# Patient Record
Sex: FEMALE | Race: WHITE | NOT HISPANIC OR LATINO | Employment: UNEMPLOYED | URBAN - METROPOLITAN AREA
[De-identification: names, ages, dates, MRNs, and addresses within clinical notes are randomized per-mention and may not be internally consistent; named-entity substitution may affect disease eponyms.]

---

## 2021-09-15 ENCOUNTER — TRANSCRIBE ORDERS (OUTPATIENT)
Dept: ADMINISTRATIVE | Facility: HOSPITAL | Age: 46
End: 2021-09-15

## 2021-09-15 DIAGNOSIS — R18.8 OTHER ASCITES: ICD-10-CM

## 2021-09-15 DIAGNOSIS — K74.69 OTHER CIRRHOSIS OF LIVER (HCC): Primary | ICD-10-CM

## 2021-09-24 ENCOUNTER — APPOINTMENT (OUTPATIENT)
Dept: CT IMAGING | Facility: HOSPITAL | Age: 46
End: 2021-09-24

## 2021-10-11 ENCOUNTER — TRANSCRIBE ORDERS (OUTPATIENT)
Dept: ADMINISTRATIVE | Facility: HOSPITAL | Age: 46
End: 2021-10-11

## 2021-10-11 DIAGNOSIS — R60.0 LOCALIZED EDEMA: Primary | ICD-10-CM

## 2021-10-11 DIAGNOSIS — R19.8 INCREASED ABDOMINAL GIRTH: ICD-10-CM

## 2021-10-11 DIAGNOSIS — M79.89 LEFT LEG SWELLING: ICD-10-CM

## 2021-10-26 ENCOUNTER — HOSPITAL ENCOUNTER (OUTPATIENT)
Dept: ULTRASOUND IMAGING | Facility: HOSPITAL | Age: 46
Discharge: HOME | End: 2021-10-26

## 2021-10-26 ENCOUNTER — HOSPITAL ENCOUNTER (OUTPATIENT)
Dept: CARDIOLOGY | Facility: HOSPITAL | Age: 46
Discharge: HOME | End: 2021-10-26

## 2021-10-26 DIAGNOSIS — M79.89 LEFT LEG SWELLING: ICD-10-CM

## 2021-10-26 DIAGNOSIS — R19.8 INCREASED ABDOMINAL GIRTH: ICD-10-CM

## 2021-10-26 DIAGNOSIS — R60.0 LOCALIZED EDEMA: ICD-10-CM

## 2021-10-26 LAB
BH CV LOWER VASCULAR LEFT COMMON FEMORAL AUGMENT: NORMAL
BH CV LOWER VASCULAR LEFT COMMON FEMORAL COMPETENT: NORMAL
BH CV LOWER VASCULAR LEFT COMMON FEMORAL COMPRESS: NORMAL
BH CV LOWER VASCULAR LEFT COMMON FEMORAL PHASIC: NORMAL
BH CV LOWER VASCULAR LEFT COMMON FEMORAL SPONT: NORMAL
BH CV LOWER VASCULAR LEFT DISTAL FEMORAL COMPRESS: NORMAL
BH CV LOWER VASCULAR LEFT GREATER SAPH AK COMPRESS: NORMAL
BH CV LOWER VASCULAR LEFT GREATER SAPH BK COMPRESS: NORMAL
BH CV LOWER VASCULAR LEFT MID FEMORAL AUGMENT: NORMAL
BH CV LOWER VASCULAR LEFT MID FEMORAL COMPETENT: NORMAL
BH CV LOWER VASCULAR LEFT MID FEMORAL COMPRESS: NORMAL
BH CV LOWER VASCULAR LEFT MID FEMORAL PHASIC: NORMAL
BH CV LOWER VASCULAR LEFT MID FEMORAL SPONT: NORMAL
BH CV LOWER VASCULAR LEFT PERONEAL COMPRESS: NORMAL
BH CV LOWER VASCULAR LEFT POPLITEAL AUGMENT: NORMAL
BH CV LOWER VASCULAR LEFT POPLITEAL COMPETENT: NORMAL
BH CV LOWER VASCULAR LEFT POPLITEAL COMPRESS: NORMAL
BH CV LOWER VASCULAR LEFT POPLITEAL PHASIC: NORMAL
BH CV LOWER VASCULAR LEFT POPLITEAL SPONT: NORMAL
BH CV LOWER VASCULAR LEFT POSTERIOR TIBIAL COMPRESS: NORMAL
BH CV LOWER VASCULAR LEFT PROXIMAL FEMORAL COMPRESS: NORMAL
BH CV LOWER VASCULAR LEFT SAPHENOFEMORAL JUNCTION COMPRESS: NORMAL
BH CV LOWER VASCULAR RIGHT COMMON FEMORAL AUGMENT: NORMAL
BH CV LOWER VASCULAR RIGHT COMMON FEMORAL COMPETENT: NORMAL
BH CV LOWER VASCULAR RIGHT COMMON FEMORAL COMPRESS: NORMAL
BH CV LOWER VASCULAR RIGHT COMMON FEMORAL PHASIC: NORMAL
BH CV LOWER VASCULAR RIGHT COMMON FEMORAL SPONT: NORMAL
MAXIMAL PREDICTED HEART RATE: 173 BPM
STRESS TARGET HR: 147 BPM

## 2021-10-26 PROCEDURE — 93971 EXTREMITY STUDY: CPT | Performed by: SURGERY

## 2021-10-26 PROCEDURE — 76700 US EXAM ABDOM COMPLETE: CPT

## 2021-10-26 PROCEDURE — 93971 EXTREMITY STUDY: CPT

## 2023-02-07 ENCOUNTER — TRANSCRIBE ORDERS (OUTPATIENT)
Dept: ADMINISTRATIVE | Facility: HOSPITAL | Age: 48
End: 2023-02-07
Payer: MEDICAID

## 2023-02-07 DIAGNOSIS — K74.60 CIRRHOSIS OF LIVER WITH ASCITES, UNSPECIFIED HEPATIC CIRRHOSIS TYPE: Primary | ICD-10-CM

## 2023-02-07 DIAGNOSIS — R18.8 CIRRHOSIS OF LIVER WITH ASCITES, UNSPECIFIED HEPATIC CIRRHOSIS TYPE: Primary | ICD-10-CM

## 2023-02-13 ENCOUNTER — HOSPITAL ENCOUNTER (OUTPATIENT)
Dept: INTERVENTIONAL RADIOLOGY/VASCULAR | Facility: HOSPITAL | Age: 48
Discharge: HOME | End: 2023-02-13
Admitting: RADIOLOGY
Payer: MEDICAID

## 2023-02-13 DIAGNOSIS — R18.8 CIRRHOSIS OF LIVER WITH ASCITES, UNSPECIFIED HEPATIC CIRRHOSIS TYPE: ICD-10-CM

## 2023-02-13 DIAGNOSIS — K74.60 CIRRHOSIS OF LIVER WITH ASCITES, UNSPECIFIED HEPATIC CIRRHOSIS TYPE: ICD-10-CM

## 2023-02-13 LAB
APTT PPP: 32.4 SECONDS (ref 24.2–34.2)
INR PPP: 1.38 (ref 0.86–1.15)
PLATELET # BLD AUTO: 58 10*3/MM3 (ref 140–450)
PROTHROMBIN TIME: 17.2 SECONDS (ref 11.8–14.9)

## 2023-02-13 PROCEDURE — 85730 THROMBOPLASTIN TIME PARTIAL: CPT | Performed by: INTERNAL MEDICINE

## 2023-02-13 PROCEDURE — 76942 ECHO GUIDE FOR BIOPSY: CPT

## 2023-02-13 PROCEDURE — 85610 PROTHROMBIN TIME: CPT | Performed by: INTERNAL MEDICINE

## 2023-02-13 PROCEDURE — 85049 AUTOMATED PLATELET COUNT: CPT | Performed by: INTERNAL MEDICINE

## 2023-02-13 RX ORDER — LIDOCAINE HYDROCHLORIDE 20 MG/ML
20 INJECTION, SOLUTION INFILTRATION; PERINEURAL ONCE
Status: COMPLETED | OUTPATIENT
Start: 2023-02-13 | End: 2023-02-13

## 2023-02-13 RX ADMIN — SODIUM BICARBONATE 1 ML: 84 INJECTION, SOLUTION INTRAVENOUS at 11:00

## 2023-02-13 RX ADMIN — LIDOCAINE HYDROCHLORIDE 9 ML: 20 INJECTION, SOLUTION INFILTRATION; PERINEURAL at 11:00

## 2023-04-14 ENCOUNTER — HOSPITAL ENCOUNTER (OUTPATIENT)
Facility: HOSPITAL | Age: 48
Setting detail: OBSERVATION
Discharge: HOME | End: 2023-04-18
Attending: INTERNAL MEDICINE | Admitting: INTERNAL MEDICINE
Payer: MEDICAID

## 2023-04-14 ENCOUNTER — PREP FOR SURGERY (OUTPATIENT)
Dept: OTHER | Facility: HOSPITAL | Age: 48
End: 2023-04-14
Payer: MEDICAID

## 2023-04-14 PROBLEM — R10.9 ABDOMINAL PAIN: Status: ACTIVE | Noted: 2023-04-14

## 2023-04-14 LAB
ALBUMIN SERPL-MCNC: 3.1 G/DL (ref 3.5–5.2)
ALP SERPL-CCNC: 151 U/L (ref 39–117)
ALT SERPL W P-5'-P-CCNC: 29 U/L (ref 1–33)
ANION GAP SERPL CALCULATED.3IONS-SCNC: 7 MMOL/L (ref 5–15)
AST SERPL-CCNC: 36 U/L (ref 1–32)
BASOPHILS # BLD AUTO: 0.02 10*3/MM3 (ref 0–0.2)
BASOPHILS NFR BLD AUTO: 0.2 % (ref 0–1.5)
BILIRUB CONJ SERPL-MCNC: 0.4 MG/DL (ref 0–0.3)
BILIRUB INDIRECT SERPL-MCNC: 1.1 MG/DL
BILIRUB SERPL-MCNC: 1.5 MG/DL (ref 0–1.2)
BUN SERPL-MCNC: 10 MG/DL (ref 6–20)
BUN/CREAT SERPL: 12.7 (ref 7–25)
CALCIUM SPEC-SCNC: 8.6 MG/DL (ref 8.6–10.5)
CHLORIDE SERPL-SCNC: 100 MMOL/L (ref 98–107)
CO2 SERPL-SCNC: 30 MMOL/L (ref 22–29)
CREAT SERPL-MCNC: 0.79 MG/DL (ref 0.57–1)
D-LACTATE SERPL-SCNC: 1.9 MMOL/L (ref 0.5–2)
DEPRECATED RDW RBC AUTO: 49.5 FL (ref 37–54)
EGFRCR SERPLBLD CKD-EPI 2021: 91.8 ML/MIN/1.73
EOSINOPHIL # BLD AUTO: 0.01 10*3/MM3 (ref 0–0.4)
EOSINOPHIL NFR BLD AUTO: 0.1 % (ref 0.3–6.2)
ERYTHROCYTE [DISTWIDTH] IN BLOOD BY AUTOMATED COUNT: 15.4 % (ref 12.3–15.4)
GLUCOSE SERPL-MCNC: 131 MG/DL (ref 65–99)
HCT VFR BLD AUTO: 42.5 % (ref 34–46.6)
HGB BLD-MCNC: 14.8 G/DL (ref 12–15.9)
HOLD SPECIMEN: NORMAL
IMM GRANULOCYTES # BLD AUTO: 0.08 10*3/MM3 (ref 0–0.05)
IMM GRANULOCYTES NFR BLD AUTO: 0.6 % (ref 0–0.5)
LYMPHOCYTES # BLD AUTO: 1.3 10*3/MM3 (ref 0.7–3.1)
LYMPHOCYTES NFR BLD AUTO: 9.9 % (ref 19.6–45.3)
MCH RBC QN AUTO: 30.9 PG (ref 26.6–33)
MCHC RBC AUTO-ENTMCNC: 34.8 G/DL (ref 31.5–35.7)
MCV RBC AUTO: 88.7 FL (ref 79–97)
MONOCYTES # BLD AUTO: 0.56 10*3/MM3 (ref 0.1–0.9)
MONOCYTES NFR BLD AUTO: 4.3 % (ref 5–12)
NEUTROPHILS NFR BLD AUTO: 11.18 10*3/MM3 (ref 1.7–7)
NEUTROPHILS NFR BLD AUTO: 84.9 % (ref 42.7–76)
NRBC BLD AUTO-RTO: 0 /100 WBC (ref 0–0.2)
PLATELET # BLD AUTO: 118 10*3/MM3 (ref 140–450)
PMV BLD AUTO: 10.8 FL (ref 6–12)
POTASSIUM SERPL-SCNC: 4.1 MMOL/L (ref 3.5–5.2)
PROT SERPL-MCNC: 7.1 G/DL (ref 6–8.5)
RBC # BLD AUTO: 4.79 10*6/MM3 (ref 3.77–5.28)
SODIUM SERPL-SCNC: 137 MMOL/L (ref 136–145)
WBC NRBC COR # BLD: 13.15 10*3/MM3 (ref 3.4–10.8)

## 2023-04-14 PROCEDURE — 96374 THER/PROPH/DIAG INJ IV PUSH: CPT

## 2023-04-14 PROCEDURE — 87040 BLOOD CULTURE FOR BACTERIA: CPT | Performed by: STUDENT IN AN ORGANIZED HEALTH CARE EDUCATION/TRAINING PROGRAM

## 2023-04-14 PROCEDURE — 96375 TX/PRO/DX INJ NEW DRUG ADDON: CPT

## 2023-04-14 PROCEDURE — 83605 ASSAY OF LACTIC ACID: CPT | Performed by: STUDENT IN AN ORGANIZED HEALTH CARE EDUCATION/TRAINING PROGRAM

## 2023-04-14 PROCEDURE — 25010000002 MORPHINE PER 10 MG: Performed by: STUDENT IN AN ORGANIZED HEALTH CARE EDUCATION/TRAINING PROGRAM

## 2023-04-14 PROCEDURE — G0379 DIRECT REFER HOSPITAL OBSERV: HCPCS

## 2023-04-14 PROCEDURE — G0378 HOSPITAL OBSERVATION PER HR: HCPCS

## 2023-04-14 PROCEDURE — 94799 UNLISTED PULMONARY SVC/PX: CPT

## 2023-04-14 PROCEDURE — 80076 HEPATIC FUNCTION PANEL: CPT

## 2023-04-14 PROCEDURE — 96372 THER/PROPH/DIAG INJ SC/IM: CPT

## 2023-04-14 PROCEDURE — 94760 N-INVAS EAR/PLS OXIMETRY 1: CPT

## 2023-04-14 PROCEDURE — 99221 1ST HOSP IP/OBS SF/LOW 40: CPT | Performed by: STUDENT IN AN ORGANIZED HEALTH CARE EDUCATION/TRAINING PROGRAM

## 2023-04-14 PROCEDURE — 80048 BASIC METABOLIC PNL TOTAL CA: CPT | Performed by: STUDENT IN AN ORGANIZED HEALTH CARE EDUCATION/TRAINING PROGRAM

## 2023-04-14 PROCEDURE — 25010000002 HEPARIN (PORCINE) PER 1000 UNITS: Performed by: STUDENT IN AN ORGANIZED HEALTH CARE EDUCATION/TRAINING PROGRAM

## 2023-04-14 PROCEDURE — 25010000002 ONDANSETRON PER 1 MG: Performed by: STUDENT IN AN ORGANIZED HEALTH CARE EDUCATION/TRAINING PROGRAM

## 2023-04-14 PROCEDURE — 85025 COMPLETE CBC W/AUTO DIFF WBC: CPT | Performed by: STUDENT IN AN ORGANIZED HEALTH CARE EDUCATION/TRAINING PROGRAM

## 2023-04-14 RX ORDER — SODIUM CHLORIDE 0.9 % (FLUSH) 0.9 %
10 SYRINGE (ML) INJECTION AS NEEDED
Status: DISCONTINUED | OUTPATIENT
Start: 2023-04-14 | End: 2023-04-18 | Stop reason: HOSPADM

## 2023-04-14 RX ORDER — SODIUM CHLORIDE 9 MG/ML
40 INJECTION, SOLUTION INTRAVENOUS AS NEEDED
Status: DISCONTINUED | OUTPATIENT
Start: 2023-04-14 | End: 2023-04-18 | Stop reason: HOSPADM

## 2023-04-14 RX ORDER — AMOXICILLIN AND CLAVULANATE POTASSIUM 875; 125 MG/1; MG/1
1 TABLET, FILM COATED ORAL 2 TIMES DAILY
COMMUNITY
Start: 2023-04-13 | End: 2023-04-18 | Stop reason: HOSPADM

## 2023-04-14 RX ORDER — HEPARIN SODIUM 5000 [USP'U]/ML
5000 INJECTION, SOLUTION INTRAVENOUS; SUBCUTANEOUS EVERY 12 HOURS SCHEDULED
Status: DISCONTINUED | OUTPATIENT
Start: 2023-04-14 | End: 2023-04-18 | Stop reason: HOSPADM

## 2023-04-14 RX ORDER — SPIRONOLACTONE 100 MG/1
100 TABLET, FILM COATED ORAL DAILY
COMMUNITY
Start: 2023-04-13

## 2023-04-14 RX ORDER — HYDROXYZINE PAMOATE 25 MG/1
25 CAPSULE ORAL 3 TIMES DAILY PRN
COMMUNITY
Start: 2022-12-21

## 2023-04-14 RX ORDER — TRAMADOL HYDROCHLORIDE 50 MG/1
50 TABLET ORAL EVERY 6 HOURS PRN
Status: COMPLETED | OUTPATIENT
Start: 2023-04-14 | End: 2023-04-15

## 2023-04-14 RX ORDER — OMEPRAZOLE 20 MG/1
20 CAPSULE, DELAYED RELEASE ORAL DAILY
COMMUNITY
Start: 2023-03-10

## 2023-04-14 RX ORDER — GABAPENTIN 300 MG/1
300 CAPSULE ORAL 3 TIMES DAILY
COMMUNITY
Start: 2023-03-20

## 2023-04-14 RX ORDER — ONDANSETRON 2 MG/ML
4 INJECTION INTRAMUSCULAR; INTRAVENOUS EVERY 6 HOURS PRN
Status: DISCONTINUED | OUTPATIENT
Start: 2023-04-14 | End: 2023-04-14 | Stop reason: SDUPTHER

## 2023-04-14 RX ORDER — MORPHINE SULFATE 2 MG/ML
1 INJECTION, SOLUTION INTRAMUSCULAR; INTRAVENOUS ONCE AS NEEDED
Status: COMPLETED | OUTPATIENT
Start: 2023-04-14 | End: 2023-04-14

## 2023-04-14 RX ORDER — MORPHINE SULFATE 2 MG/ML
1 INJECTION, SOLUTION INTRAMUSCULAR; INTRAVENOUS EVERY 4 HOURS PRN
Status: DISCONTINUED | OUTPATIENT
Start: 2023-04-14 | End: 2023-04-14

## 2023-04-14 RX ORDER — LACTULOSE 10 G/15ML
20 SOLUTION ORAL DAILY
COMMUNITY
Start: 2023-04-13

## 2023-04-14 RX ORDER — CEFTRIAXONE SODIUM 2 G/50ML
2 INJECTION, SOLUTION INTRAVENOUS EVERY 24 HOURS
Status: COMPLETED | OUTPATIENT
Start: 2023-04-14 | End: 2023-04-16

## 2023-04-14 RX ORDER — BUPRENORPHINE HYDROCHLORIDE AND NALOXONE HYDROCHLORIDE DIHYDRATE 8; 2 MG/1; MG/1
TABLET SUBLINGUAL
Status: ON HOLD | COMMUNITY
Start: 2023-04-10 | End: 2023-04-14

## 2023-04-14 RX ORDER — TIZANIDINE 4 MG/1
4 TABLET ORAL 2 TIMES DAILY PRN
Status: ON HOLD | COMMUNITY
Start: 2023-03-20 | End: 2023-04-14

## 2023-04-14 RX ORDER — SODIUM CHLORIDE 0.9 % (FLUSH) 0.9 %
10 SYRINGE (ML) INJECTION EVERY 12 HOURS SCHEDULED
Status: DISCONTINUED | OUTPATIENT
Start: 2023-04-14 | End: 2023-04-18 | Stop reason: HOSPADM

## 2023-04-14 RX ORDER — FUROSEMIDE 40 MG/1
40 TABLET ORAL DAILY
COMMUNITY
Start: 2023-04-13

## 2023-04-14 RX ORDER — ONDANSETRON 2 MG/ML
4 INJECTION INTRAMUSCULAR; INTRAVENOUS EVERY 6 HOURS PRN
Status: DISCONTINUED | OUTPATIENT
Start: 2023-04-14 | End: 2023-04-18 | Stop reason: HOSPADM

## 2023-04-14 RX ADMIN — Medication 10 ML: at 22:21

## 2023-04-14 RX ADMIN — TRAMADOL HYDROCHLORIDE 50 MG: 50 TABLET ORAL at 22:21

## 2023-04-14 RX ADMIN — HEPARIN SODIUM 5000 UNITS: 5000 INJECTION INTRAVENOUS; SUBCUTANEOUS at 23:38

## 2023-04-14 RX ADMIN — ONDANSETRON 4 MG: 2 INJECTION INTRAMUSCULAR; INTRAVENOUS at 22:20

## 2023-04-14 RX ADMIN — MORPHINE SULFATE 1 MG: 2 INJECTION, SOLUTION INTRAMUSCULAR; INTRAVENOUS at 23:38

## 2023-04-14 NOTE — PROGRESS NOTES
Accept Note:  Contacted by Dr. Beach at [facility] requesting transfer for GI evaluation.  Per report, pt has cirrhosis and presented there on 4/11/2023 where she underwent a paracentesis with no signs of SBP.  She presented today with abd pain and vomiting.  She was afebrile.  WBC up from 8 to 11.  He spoke with Dr. Mathis who agreed for GI here to see the patient and notified the on-call GI doc here Dr. Veras.     Electronically signed by Esdras Gates MD, 04/14/23, 7:31 PM EDT.

## 2023-04-15 LAB
ANION GAP SERPL CALCULATED.3IONS-SCNC: 8 MMOL/L (ref 5–15)
BACTERIA UR QL AUTO: ABNORMAL /HPF
BASOPHILS # BLD AUTO: 0.02 10*3/MM3 (ref 0–0.2)
BASOPHILS NFR BLD AUTO: 0.2 % (ref 0–1.5)
BILIRUB UR QL STRIP: NEGATIVE
BUN SERPL-MCNC: 9 MG/DL (ref 6–20)
BUN/CREAT SERPL: 13.2 (ref 7–25)
CALCIUM SPEC-SCNC: 8.4 MG/DL (ref 8.6–10.5)
CHLORIDE SERPL-SCNC: 100 MMOL/L (ref 98–107)
CLARITY UR: ABNORMAL
CO2 SERPL-SCNC: 27 MMOL/L (ref 22–29)
COLOR UR: ABNORMAL
CREAT SERPL-MCNC: 0.68 MG/DL (ref 0.57–1)
DEPRECATED RDW RBC AUTO: 49.5 FL (ref 37–54)
EGFRCR SERPLBLD CKD-EPI 2021: 106.9 ML/MIN/1.73
EOSINOPHIL # BLD AUTO: 0.01 10*3/MM3 (ref 0–0.4)
EOSINOPHIL NFR BLD AUTO: 0.1 % (ref 0.3–6.2)
ERYTHROCYTE [DISTWIDTH] IN BLOOD BY AUTOMATED COUNT: 15.6 % (ref 12.3–15.4)
GLUCOSE SERPL-MCNC: 134 MG/DL (ref 65–99)
GLUCOSE UR STRIP-MCNC: NEGATIVE MG/DL
HCT VFR BLD AUTO: 39.1 % (ref 34–46.6)
HGB BLD-MCNC: 13.9 G/DL (ref 12–15.9)
HGB UR QL STRIP.AUTO: ABNORMAL
HYALINE CASTS UR QL AUTO: ABNORMAL /LPF
IMM GRANULOCYTES # BLD AUTO: 0.03 10*3/MM3 (ref 0–0.05)
IMM GRANULOCYTES NFR BLD AUTO: 0.3 % (ref 0–0.5)
KETONES UR QL STRIP: NEGATIVE
LEUKOCYTE ESTERASE UR QL STRIP.AUTO: ABNORMAL
LYMPHOCYTES # BLD AUTO: 1.4 10*3/MM3 (ref 0.7–3.1)
LYMPHOCYTES NFR BLD AUTO: 12.5 % (ref 19.6–45.3)
MCH RBC QN AUTO: 31.3 PG (ref 26.6–33)
MCHC RBC AUTO-ENTMCNC: 35.5 G/DL (ref 31.5–35.7)
MCV RBC AUTO: 88.1 FL (ref 79–97)
MONOCYTES # BLD AUTO: 0.62 10*3/MM3 (ref 0.1–0.9)
MONOCYTES NFR BLD AUTO: 5.5 % (ref 5–12)
NEUTROPHILS NFR BLD AUTO: 81.4 % (ref 42.7–76)
NEUTROPHILS NFR BLD AUTO: 9.15 10*3/MM3 (ref 1.7–7)
NITRITE UR QL STRIP: NEGATIVE
NRBC BLD AUTO-RTO: 0 /100 WBC (ref 0–0.2)
PH UR STRIP.AUTO: 6.5 [PH] (ref 5–8)
PLATELET # BLD AUTO: 110 10*3/MM3 (ref 140–450)
PMV BLD AUTO: 10.4 FL (ref 6–12)
POTASSIUM SERPL-SCNC: 3.7 MMOL/L (ref 3.5–5.2)
PROT UR QL STRIP: ABNORMAL
RBC # BLD AUTO: 4.44 10*6/MM3 (ref 3.77–5.28)
RBC # UR STRIP: ABNORMAL /HPF
REF LAB TEST METHOD: ABNORMAL
SODIUM SERPL-SCNC: 135 MMOL/L (ref 136–145)
SP GR UR STRIP: 1.02 (ref 1–1.03)
SQUAMOUS #/AREA URNS HPF: ABNORMAL /HPF
UROBILINOGEN UR QL STRIP: ABNORMAL
WBC # UR STRIP: ABNORMAL /HPF
WBC NRBC COR # BLD: 11.23 10*3/MM3 (ref 3.4–10.8)

## 2023-04-15 PROCEDURE — 96372 THER/PROPH/DIAG INJ SC/IM: CPT

## 2023-04-15 PROCEDURE — 96376 TX/PRO/DX INJ SAME DRUG ADON: CPT

## 2023-04-15 PROCEDURE — 99253 IP/OBS CNSLTJ NEW/EST LOW 45: CPT | Performed by: INTERNAL MEDICINE

## 2023-04-15 PROCEDURE — 96375 TX/PRO/DX INJ NEW DRUG ADDON: CPT

## 2023-04-15 PROCEDURE — 81001 URINALYSIS AUTO W/SCOPE: CPT | Performed by: INTERNAL MEDICINE

## 2023-04-15 PROCEDURE — 25010000002 PROCHLORPERAZINE 10 MG/2ML SOLUTION

## 2023-04-15 PROCEDURE — 0 CEFTRIAXONE PER 250 MG: Performed by: STUDENT IN AN ORGANIZED HEALTH CARE EDUCATION/TRAINING PROGRAM

## 2023-04-15 PROCEDURE — 25010000002 MORPHINE PER 10 MG: Performed by: INTERNAL MEDICINE

## 2023-04-15 PROCEDURE — 94799 UNLISTED PULMONARY SVC/PX: CPT

## 2023-04-15 PROCEDURE — 99233 SBSQ HOSP IP/OBS HIGH 50: CPT | Performed by: INTERNAL MEDICINE

## 2023-04-15 PROCEDURE — 25010000002 HEPARIN (PORCINE) PER 1000 UNITS: Performed by: STUDENT IN AN ORGANIZED HEALTH CARE EDUCATION/TRAINING PROGRAM

## 2023-04-15 PROCEDURE — 25010000002 MORPHINE PER 10 MG: Performed by: STUDENT IN AN ORGANIZED HEALTH CARE EDUCATION/TRAINING PROGRAM

## 2023-04-15 PROCEDURE — G0378 HOSPITAL OBSERVATION PER HR: HCPCS

## 2023-04-15 PROCEDURE — 80048 BASIC METABOLIC PNL TOTAL CA: CPT | Performed by: STUDENT IN AN ORGANIZED HEALTH CARE EDUCATION/TRAINING PROGRAM

## 2023-04-15 PROCEDURE — 87086 URINE CULTURE/COLONY COUNT: CPT | Performed by: INTERNAL MEDICINE

## 2023-04-15 PROCEDURE — 85025 COMPLETE CBC W/AUTO DIFF WBC: CPT | Performed by: STUDENT IN AN ORGANIZED HEALTH CARE EDUCATION/TRAINING PROGRAM

## 2023-04-15 RX ORDER — PROCHLORPERAZINE EDISYLATE 5 MG/ML
5 INJECTION INTRAMUSCULAR; INTRAVENOUS EVERY 6 HOURS PRN
Status: DISCONTINUED | OUTPATIENT
Start: 2023-04-15 | End: 2023-04-18 | Stop reason: HOSPADM

## 2023-04-15 RX ORDER — HYDROXYZINE PAMOATE 25 MG/1
25 CAPSULE ORAL 3 TIMES DAILY PRN
Status: DISCONTINUED | OUTPATIENT
Start: 2023-04-15 | End: 2023-04-18 | Stop reason: HOSPADM

## 2023-04-15 RX ORDER — MORPHINE SULFATE 2 MG/ML
2 INJECTION, SOLUTION INTRAMUSCULAR; INTRAVENOUS EVERY 4 HOURS PRN
Status: DISCONTINUED | OUTPATIENT
Start: 2023-04-15 | End: 2023-04-18 | Stop reason: HOSPADM

## 2023-04-15 RX ORDER — MORPHINE SULFATE 2 MG/ML
1 INJECTION, SOLUTION INTRAMUSCULAR; INTRAVENOUS ONCE
Status: COMPLETED | OUTPATIENT
Start: 2023-04-15 | End: 2023-04-15

## 2023-04-15 RX ORDER — PANTOPRAZOLE SODIUM 40 MG/1
40 TABLET, DELAYED RELEASE ORAL
Status: DISCONTINUED | OUTPATIENT
Start: 2023-04-16 | End: 2023-04-18 | Stop reason: HOSPADM

## 2023-04-15 RX ORDER — GABAPENTIN 300 MG/1
300 CAPSULE ORAL 3 TIMES DAILY
Status: DISCONTINUED | OUTPATIENT
Start: 2023-04-15 | End: 2023-04-18 | Stop reason: HOSPADM

## 2023-04-15 RX ORDER — SPIRONOLACTONE 25 MG/1
100 TABLET ORAL DAILY
Status: DISCONTINUED | OUTPATIENT
Start: 2023-04-15 | End: 2023-04-18 | Stop reason: HOSPADM

## 2023-04-15 RX ORDER — LACTULOSE 10 G/15ML
20 SOLUTION ORAL DAILY
Status: DISCONTINUED | OUTPATIENT
Start: 2023-04-15 | End: 2023-04-18 | Stop reason: HOSPADM

## 2023-04-15 RX ORDER — FUROSEMIDE 40 MG/1
40 TABLET ORAL DAILY
Status: DISCONTINUED | OUTPATIENT
Start: 2023-04-15 | End: 2023-04-16

## 2023-04-15 RX ADMIN — PROCHLORPERAZINE EDISYLATE 5 MG: 5 INJECTION, SOLUTION INTRAMUSCULAR; INTRAVENOUS at 01:46

## 2023-04-15 RX ADMIN — MORPHINE SULFATE 1 MG: 2 INJECTION, SOLUTION INTRAMUSCULAR; INTRAVENOUS at 01:46

## 2023-04-15 RX ADMIN — HEPARIN SODIUM 5000 UNITS: 5000 INJECTION INTRAVENOUS; SUBCUTANEOUS at 09:24

## 2023-04-15 RX ADMIN — CEFTRIAXONE SODIUM 2 G: 2 INJECTION, SOLUTION INTRAVENOUS at 22:14

## 2023-04-15 RX ADMIN — GABAPENTIN 300 MG: 300 CAPSULE ORAL at 16:01

## 2023-04-15 RX ADMIN — FUROSEMIDE 40 MG: 40 TABLET ORAL at 12:56

## 2023-04-15 RX ADMIN — Medication 10 ML: at 21:08

## 2023-04-15 RX ADMIN — SPIRONOLACTONE 100 MG: 25 TABLET ORAL at 12:56

## 2023-04-15 RX ADMIN — LACTULOSE 20 G: 20 SOLUTION ORAL at 12:56

## 2023-04-15 RX ADMIN — TRAMADOL HYDROCHLORIDE 50 MG: 50 TABLET ORAL at 04:21

## 2023-04-15 RX ADMIN — Medication 10 ML: at 09:25

## 2023-04-15 RX ADMIN — MORPHINE SULFATE 2 MG: 2 INJECTION, SOLUTION INTRAMUSCULAR; INTRAVENOUS at 09:24

## 2023-04-15 RX ADMIN — GABAPENTIN 300 MG: 300 CAPSULE ORAL at 21:07

## 2023-04-15 RX ADMIN — MORPHINE SULFATE 2 MG: 2 INJECTION, SOLUTION INTRAMUSCULAR; INTRAVENOUS at 21:06

## 2023-04-15 RX ADMIN — CEFTRIAXONE SODIUM 2 G: 2 INJECTION, SOLUTION INTRAVENOUS at 02:40

## 2023-04-15 RX ADMIN — HYDROXYZINE PAMOATE 25 MG: 25 CAPSULE ORAL at 21:07

## 2023-04-15 RX ADMIN — HEPARIN SODIUM 5000 UNITS: 5000 INJECTION INTRAVENOUS; SUBCUTANEOUS at 21:08

## 2023-04-15 NOTE — CONSULTS
[facility]  Initial Inpatient Consult Note    Referring Provider: Hospitalist service    Reason for Consultation: Question SBP    Subjective     History of present illness:    48 y.o. female history of cirrhosis narcotic abuse status post recent paracentesis at [facility] hospital that showed no signs of SBP if she came in with abdominal pain and vomiting White count was up to 13,000 the patient states she is feeling much better today she is on IV antibiotics and does not want a repeat paracentesis she has not taken any of her diuretics she initially had some leakage around the site but that has stopped    Past Medical History:  Past Medical History:   Diagnosis Date   • Advanced hepatic cirrhosis    • History of drug use    • Seizure    • Smoking greater than 30 pack years      Past Surgical History:  Past Surgical History:   Procedure Laterality Date   • CHOLECYSTECTOMY     • TUBAL ABDOMINAL LIGATION        Social History:   Social History     Tobacco Use   • Smoking status: Every Day     Packs/day: 0.50     Years: 15.00     Pack years: 7.50     Types: Cigarettes   • Smokeless tobacco: Never   Substance Use Topics   • Alcohol use: Never      Family History:  History reviewed. No pertinent family history.    Home Meds:  Medications Prior to Admission   Medication Sig Dispense Refill Last Dose   • amoxicillin-clavulanate (AUGMENTIN) 875-125 MG per tablet Take 1 tablet by mouth 2 (Two) Times a Day.   4/14/2023   • furosemide (LASIX) 40 MG tablet Take 1 tablet by mouth Daily.   4/14/2023   • gabapentin (NEURONTIN) 300 MG capsule Take 1 capsule by mouth 3 (Three) Times a Day.   4/14/2023   • lactulose (CHRONULAC) 10 GM/15ML solution Take 30 mL by mouth Daily.   4/14/2023   • omeprazole (priLOSEC) 20 MG capsule Take 1 capsule by mouth Daily.   4/14/2023   • spironolactone (ALDACTONE) 100 MG tablet Take 1 tablet by mouth Daily.   4/14/2023   • hydrOXYzine pamoate (VISTARIL) 25 MG capsule Take 1  capsule by mouth 3 (Three) Times a Day As Needed.   Unknown     Current Meds:   cefTRIAXone, 2 g, Intravenous, Q24H  heparin (porcine), 5,000 Units, Subcutaneous, Q12H  sodium chloride, 10 mL, Intravenous, Q12H      Allergies:  Allergies   Allergen Reactions   • Codeine Nausea Only     Review of Systems  Pertinent items are noted in HPI, all other systems reviewed and negative         Vital Signs  Temp:  [97.3 °F (36.3 °C)-98.2 °F (36.8 °C)] 97.6 °F (36.4 °C)  Heart Rate:  [] 93  Resp:  [18-20] 18  BP: (146-160)/(64-78) 146/68  Physical Exam:  General Appearance:    Alert, cooperative, in no acute distress   Head:    Normocephalic, without obvious abnormality, atraumatic   Eyes:          conjunctivae and sclerae normal, no   icterus   Throat:   no thrush, oral mucosa moist   Neck:   Supple, no adenopathy   Lungs:     Clear to auscultation bilaterally    Heart:    Regular rhythm and normal rate    Chest Wall:    No abnormalities observed   Abdomen:     Soft, ascitic fluid accumulated paracentesis site nonerythematous mild tenderness diffusely   Extremities:   no edema, no redness   Skin:   No bruising or rash   Psychiatric:  normal mood and insight     Results Review:  [x]  Laboratory   []  Radiology  []  Pathology      I reviewed the patient's new clinical results.    Results from last 7 days   Lab Units 04/15/23  0837 04/14/23  2259   WBC 10*3/mm3 11.23* 13.15*   HEMOGLOBIN g/dL 13.9 14.8   HEMATOCRIT % 39.1 42.5   PLATELETS 10*3/mm3 110* 118*     Results from last 7 days   Lab Units 04/14/23  2104   SODIUM mmol/L 137   POTASSIUM mmol/L 4.1   CHLORIDE mmol/L 100   CO2 mmol/L 30.0*   BUN mg/dL 10   CREATININE mg/dL 0.79   CALCIUM mg/dL 8.6   BILIRUBIN mg/dL 1.5*   ALK PHOS U/L 151*   ALT (SGPT) U/L 29   AST (SGOT) U/L 36*   GLUCOSE mg/dL 131*         No results found for: LIPASE    Radiology:  US Paracentesis    (Results Pending)        Assessment & Plan     Active Hospital Problems    Diagnosis    •  **Abdominal pain         Plan:  Continue IV antibiotics for now; follow-up blood and urine cultures  Patient does not want any repeat paracentesis her white count is slightly elevated from yesterday at 13,000' the site appears warm dry and not infected; difficult to say why she would have SBP if the other outlying hospitals stated that all her work-up for SBP was negative???  As of this past Tuesday  I would restart her diuretics  IOtherwise continue supportive care      I discussed the patients findings and my recommendations with patient and nursing staff.    Electronically signed by Abrahan Siddiqui MD, 04/15/23, 9:23 AM EDT.

## 2023-04-15 NOTE — PLAN OF CARE
Goal Outcome Evaluation:              Outcome Evaluation: Pt has had complaints of pain and nausea since arriving to unit. Pt medicated per orders. Pt states that pain relieving measures have not relieved pain. Pt still continues to leak from paracentesis punture site. VSS. Will continue to monitor.

## 2023-04-15 NOTE — PLAN OF CARE
Goal Outcome Evaluation:              Outcome Evaluation: patient has had no complaints of nausea this shift. IV morphine given x1 dose and pain level significantly improved. \patient did not have abdominal paracentisis this shift, as MD believes it isn't warrented at this time. vss

## 2023-04-15 NOTE — PLAN OF CARE
Goal Outcome Evaluation:           Patient reports pain level decreasing as well as decreased drainage from paracentesis site. Clear, pale, yellow fluid noted, no s/s of infection. Patient also reports abd feeling less taut. Diuretics added back to regimen today. Patient is resting comfortably in bed at this time and hopes to discharge tomorrow.

## 2023-04-15 NOTE — PROGRESS NOTES
[facility]   Hospitalist Progress Note  Date: 4/15/2023  Patient Name: Lara Brock  : 1975  MRN: [id number]  Date of admission: 2023      Subjective   Subjective     Chief Complaint: Abdominal pain    Summary: 47 yo female with h/o narcotic abuse, cirrhosis who was recently admitted to [place] on - for ascites.  She had a paracentesis and per report no signs of SBP on that fluid.  She says she also had pneumonia and was given what IV antibiotics in the hospital.  She was prescribed amoxicillin (or Augmentin?) upon dc.  The next day she came in with vomiting and abdominal pain and was transferred here for further workup.  She does not want a repeat para.    Interval Followup: Says she feels much better.  Abd pain much improved.    Review of Systems   All systems were reviewed and negative except for what is outlined above    Objective   Objective     Vitals:   Temp:  [97.3 °F (36.3 °C)-98.2 °F (36.8 °C)] 97.6 °F (36.4 °C)  Heart Rate:  [] 93  Resp:  [18-20] 18  BP: (146-160)/(64-78) 146/68  Physical Exam    Constitutional: Awake, alert, no acute distress, nontoxic appearing   Eyes: Pupils equal, sclerae anicteric, no conjunctival injection   HENT: NCAT, mucous membranes moist   Neck: Supple, no thyromegaly, no lymphadenopathy, trachea midline   Respiratory: Clear to auscultation bilaterally, nonlabored respirations    Cardiovascular: RRR, no murmurs, rubs, or gallops, palpable pedal pulses bilaterally   Gastrointestinal: sndnt +BS; para site dressed   Musculoskeletal: No bilateral ankle edema, no clubbing or cyanosis to extremities   Psychiatric: Appropriate affect, cooperative   Neurologic: Oriented x 3, speech clear   Skin: No rashes     Result Review    Result Review:  I have reviewed the following:  [x]  Laboratory   CBC        2023    10:23 2023    22:59 4/15/2023    08:37   CBC   WBC  13.15   11.23     RBC  4.79   4.44     Hemoglobin  14.8   13.9    Patient was left a detail message to call back if she agrees with plan.  Transfer to triage dept [id number]   Hematocrit  42.5   39.1     MCV  88.7   88.1     MCH  30.9   31.3     MCHC  34.8   35.5     RDW  15.4   15.6     Platelets 58   118   110       CMP        4/14/2023    21:04 4/15/2023    08:37   CMP   Glucose 131   134     BUN 10   9     Creatinine 0.79   0.68     EGFR 91.8   106.9     Sodium 137   135     Potassium 4.1   3.7     Chloride 100   100     Calcium 8.6   8.4     Total Protein 7.1      Albumin 3.1      Total Bilirubin 1.5      Alkaline Phosphatase 151      AST (SGOT) 36      ALT (SGPT) 29      BUN/Creatinine Ratio 12.7   13.2     Anion Gap 7.0   8.0         []  Microbiology  []  Radiology  []  EKG/Telemetry   []  Cardiology/Vascular   []  Pathology  []  Old records  []  Other:    Assessment & Plan   Assessment / Plan     Assessment:  • Abdominal pain  • Cirrhosis with ascites  • Mild leukocytosis  • Recent pneumonia per patient    Plan:  · Transferred to [facility] per request of [facility] MD  · I'm wondering if her vomiting was actually from the oral antibiotic as the timing of her symptoms seems to coincide with ti  · Will continue abx and repeat wbc in am  · Suspect if nothing new arises she could go home tomorrow    · Appreciate GI input     Discussed plan with RN.    DVT prophylaxis:  Medical DVT prophylaxis orders are present.    CODE STATUS:   Code Status (Patient has no pulse and is not breathing): CPR (Attempt to Resuscitate)  Medical Interventions (Patient has pulse or is breathing): Full Support        Electronically signed by Esdras Gates MD, 04/15/23, 11:17 AM EDT.

## 2023-04-15 NOTE — H&P
[facility]    Patient Identification:  Name:  Lara Brock  Age:  48 y.o.  Sex:  female  :  1975  MRN:  [id number]   Visit Number:  [id number]  Admit Date: 2023   Room number:  4020/1  Primary Care Physician:  Soy Garcia MD    Date of Admission: 2023     Subjective     Chief complaint:  Abdominal pain    History of presenting illness:    This is a 48-year-old female with a past medical history of liver cirrhosis, substance abuse, GERD, hepatitis B, hepatitis C, smoker and seizures presenting with abdominal pain. Pt initially presented to [facility] on  and underwent a paracentesis. She now presents with abdominal pain. Pt initially presented to [facility] and was found to have an increase in WBC from 8 to 11. She transferred to our hospital for further managemet. Pt states she has continued to to have fluid leaking from her recent parcentesis site. Her abdomen is still distended and she is severely tender to minimal palpation. She is now complaining of abdominal pain, nausea and vomiting.   ---------------------------------------------------------------------------------------------------------------------   Review of Systems   Constitutional: Negative for appetite change and fatigue.   HENT: Negative for mouth sores and sore throat.    Eyes: Negative for redness.   Respiratory: Negative for chest tightness.    Cardiovascular: Negative for leg swelling.   Gastrointestinal: Positive for abdominal pain.   Genitourinary: Negative for dysuria and pelvic pain.   Musculoskeletal: Negative for gait problem.   Skin: Negative for pallor.   Allergic/Immunologic: Negative for food allergies.   Neurological: Negative for speech difficulty.   Psychiatric/Behavioral: Negative for confusion.     ---------------------------------------------------------------------------------------------------------------------   Past Medical History:   Diagnosis  Date   • Advanced hepatic cirrhosis    • History of drug use    • Seizure    • Smoking greater than 30 pack years      Past Surgical History:   Procedure Laterality Date   • CHOLECYSTECTOMY     • TUBAL ABDOMINAL LIGATION       History reviewed. No pertinent family history.  Social History     Socioeconomic History   • Marital status: Single   Tobacco Use   • Smoking status: Every Day     Packs/day: 0.50     Years: 15.00     Pack years: 7.50     Types: Cigarettes   • Smokeless tobacco: Never   Vaping Use   • Vaping Use: Never used   Substance and Sexual Activity   • Alcohol use: Never   • Drug use: Yes     Types: Marijuana   • Sexual activity: Not Currently     ---------------------------------------------------------------------------------------------------------------------   Allergies:  Codeine  ---------------------------------------------------------------------------------------------------------------------   Medications below are reported home medications pulling from within the system; at this time, these medications have not been reconciled unless otherwise specified and are in the verification process for further verifcation as current home medications.      Prior to Admission Medications     Prescriptions Last Dose Informant Patient Reported? Taking?    amoxicillin-clavulanate (AUGMENTIN) 875-125 MG per tablet 4/14/2023  Yes Yes    Take 1 tablet by mouth 2 (Two) Times a Day.    furosemide (LASIX) 40 MG tablet 4/14/2023  Yes Yes    Take 1 tablet by mouth Daily.    gabapentin (NEURONTIN) 300 MG capsule 4/14/2023  Yes Yes    Take 1 capsule by mouth 3 (Three) Times a Day.    lactulose (CHRONULAC) 10 GM/15ML solution 4/14/2023  Yes Yes    Take 30 mL by mouth Daily.    omeprazole (priLOSEC) 20 MG capsule 4/14/2023  Yes Yes    Take 1 capsule by mouth Daily.    spironolactone (ALDACTONE) 100 MG tablet 4/14/2023  Yes Yes    Take 1 tablet by mouth Daily.    hydrOXYzine pamoate (VISTARIL) 25 MG capsule Unknown  Yes  No    Take 1 capsule by mouth 3 (Three) Times a Day As Needed.        Objective     Vital Signs:       No data found.     on   ;      There is no height or weight on file to calculate BMI.    Wt Readings from Last 3 Encounters:   No data found for Wt      ----------------------------------------------------------------------------------------------------------------------  PHYSICAL EXAMINATION:  GENERAL: The patient is well developed and nontoxic.  HEENT: Nonicteric sclerae, PERRLA, EOMI. Oropharynx clear. Moist mucous membranes. Conjunctivae appear well perfused.  CHEST: Chest wall is nontender.  HEART: Regular rate and rhythm without murmurs.  LUNGS: Clear to auscultation bilaterally.  ABDOMEN: distended, round and tender to palpation  RECTAL: Deferred.  SKIN: No rash, no excessive bruising, petechiae, or purpura.  NEUROLOGIC: Cranial nerves II-XII intact without motor/sensory deficit.    ---------------------------------------------------------------------------------------------------------------------  --------------------------------------------------------------------------------------------------------------------  LABS:    CBC and coagulation:      Acid/base balance:      Renal and electrolytes:      CrCl cannot be calculated (No successful lab value found.).    Liver and pancreatic function:        Invalid input(s): PROT  Endocrine function:  No results found for: HGBA1C  Point of care bedside glucose levels:      No results found for: TSH, FREET4  Cardiac:        Cultures:  No results found for: COLORU, CLARITYU, SPECGRAV, PHUR, PROTEINUR, GLUCOSEU, KETONESU, BLOODU, NITRITEU, LEUKOCYTESUR, BILIRUBINUR, UROBILINOGEN, RBCUA, WBCUA, BACTERIA, UACOMMENT  Microbiology Results (last 10 days)     ** No results found for the last 240 hours. **          No results found for: PREGTESTUR, PREGSERUM, HCG, HCGQUANT  Pain Management Panel          View : No data to display.                      I have personally  looked at the labs and they are summarized above.  ----------------------------------------------------------------------------------------------------------------------  Detailed radiology reports for the last 24 hours:    Imaging Results (Last 24 Hours)     ** No results found for the last 24 hours. **        Final impressions for the last 30 days of radiology reports:    CT Abdomen Pelvis Without Contrast    Result Date: 4/14/2023  Cirrhosis with a small amount of ascites but severe anasarca. Electronically Signed: Lencho Orr MD 2023/04/14 at 13:40 CDT Reading Location ID and State: [address] / [place] Tel [phone], Service support  [phone], Fax [phone]    XR Chest 1 View    Result Date: 4/12/2023  Slight interval worsening of left basilar airspace disease with further obscuring of the left hemidiaphragm. This could represent new or enlarging small left pleural effusion with adjacent atelectasis. Pneumonia also possible. Electronically Signed: Jonathan Shook MD 2023/04/12 at 6:34 CDT Reading Location ID and State: [address] / [place] Tel [phone], Service support  [phone], Fax [phone]    XR Chest 1 View    Result Date: 4/11/2023  Findings suggest developing multifocal pneumonia with small pleural effusions. Electronically Signed: Shaquille Siu MD 2023/04/11 at 15:32 CDT Reading Location ID and State: [address] / [place] Tel [phone], Service support  [phone], Fax [phone]    Ultrasound abdomen right upper quadrant    Result Date: 4/12/2023  Cirrhosis with a small amount of ascites. Electronically Signed: Lencho Orr MD 2023/04/12 at 8:16 CDT Reading Location ID and State: [address] / [place] Tel [phone], Service support  [phone], Fax [phone]        Assessment & Plan     This is a 48-year-old female with a past medical history of liver cirrhosis, substance abuse, GERD, hepatitis B, hepatitis C, smoker and seizures presenting with abdominal pain    #Assessment:  - Abdominal  pain, R/O SBP  - Liver cirrhosis  - Ascites  - GERD  - Substance abuse  - Hepatitis B/B  - Seizures    #Plan:  - Admit to obs  - Start Ceftriaxone 2g Daily  - Consult IR for tentative paracentesis  - Consult GI  - Blood cultures  - Pain management  - Ingrid East MD  [facility] Hospitalist  04/14/23  20:46 EDT

## 2023-04-15 NOTE — PLAN OF CARE
Problem: Adult Inpatient Plan of Care  Goal: Plan of Care Review  Outcome: Ongoing, Progressing  Goal: Patient-Specific Goal (Individualized)  Outcome: Ongoing, Progressing  Goal: Absence of Hospital-Acquired Illness or Injury  Outcome: Ongoing, Progressing  Intervention: Prevent Skin Injury  Description: Perform a screening for skin injury risk, such as pressure or moisture associated skin damage on admission and at regular intervals throughout hospital stay.  Keep all areas of skin (especially folds) clean and dry.  Maintain adequate skin hydration.  Relieve and redistribute pressure and protect bony prominences; implement measures based on patient-specific risk factors.  Match turning and repositioning schedule to clinical condition.  Encourage weight shift frequently; assist with reposition if unable to complete independently.  Float heels off bed; avoid pressure on the Achilles tendon.  Keep skin free from extended contact with medical devices.  Encourage functional activity and mobility, as early as tolerated.  Use aids (e.g., slide boards, mechanical lift) during transfer.  Goal: Optimal Comfort and Wellbeing  Outcome: Ongoing, Progressing  Intervention: Monitor Pain and Promote Comfort  Description: Assess pain level, treatment efficacy and patient response at regular intervals using a consistent pain scale.  Consider the presence and impact of preexisting chronic pain.  Encourage patient and caregiver involvement in pain assessment, interventions and safety measures.  Intervention: Provide Person-Centered Care  Description: Use a family-focused approach to care.  Develop trust and rapport by proactively providing information, encouraging questions, addressing concerns and offering reassurance.  Acknowledge emotional response to hospitalization.  Recognize and utilize personal coping strategies.  Honor spiritual and cultural preferences.  Goal: Readiness for Transition of Care  Outcome: Ongoing,  Progressing   Goal Outcome Evaluation:

## 2023-04-16 LAB
ALBUMIN SERPL-MCNC: 2.8 G/DL (ref 3.5–5.2)
ALBUMIN/GLOB SERPL: 0.8 G/DL
ALP SERPL-CCNC: 145 U/L (ref 39–117)
ALT SERPL W P-5'-P-CCNC: 29 U/L (ref 1–33)
ANION GAP SERPL CALCULATED.3IONS-SCNC: 11.9 MMOL/L (ref 5–15)
AST SERPL-CCNC: 39 U/L (ref 1–32)
BACTERIA SPEC AEROBE CULT: NO GROWTH
BASOPHILS # BLD AUTO: 0.03 10*3/MM3 (ref 0–0.2)
BASOPHILS NFR BLD AUTO: 0.4 % (ref 0–1.5)
BILIRUB SERPL-MCNC: 0.8 MG/DL (ref 0–1.2)
BUN SERPL-MCNC: 8 MG/DL (ref 6–20)
BUN/CREAT SERPL: 11.3 (ref 7–25)
CALCIUM SPEC-SCNC: 8.3 MG/DL (ref 8.6–10.5)
CHLORIDE SERPL-SCNC: 102 MMOL/L (ref 98–107)
CO2 SERPL-SCNC: 24.1 MMOL/L (ref 22–29)
CREAT SERPL-MCNC: 0.71 MG/DL (ref 0.57–1)
DEPRECATED RDW RBC AUTO: 51.5 FL (ref 37–54)
EGFRCR SERPLBLD CKD-EPI 2021: 104.4 ML/MIN/1.73
EOSINOPHIL # BLD AUTO: 0.1 10*3/MM3 (ref 0–0.4)
EOSINOPHIL NFR BLD AUTO: 1.4 % (ref 0.3–6.2)
ERYTHROCYTE [DISTWIDTH] IN BLOOD BY AUTOMATED COUNT: 15.7 % (ref 12.3–15.4)
GLOBULIN UR ELPH-MCNC: 3.5 GM/DL
GLUCOSE SERPL-MCNC: 94 MG/DL (ref 65–99)
HCT VFR BLD AUTO: 39.8 % (ref 34–46.6)
HGB BLD-MCNC: 13.6 G/DL (ref 12–15.9)
IMM GRANULOCYTES # BLD AUTO: 0.02 10*3/MM3 (ref 0–0.05)
IMM GRANULOCYTES NFR BLD AUTO: 0.3 % (ref 0–0.5)
LYMPHOCYTES # BLD AUTO: 1.67 10*3/MM3 (ref 0.7–3.1)
LYMPHOCYTES NFR BLD AUTO: 23.7 % (ref 19.6–45.3)
MCH RBC QN AUTO: 30.8 PG (ref 26.6–33)
MCHC RBC AUTO-ENTMCNC: 34.2 G/DL (ref 31.5–35.7)
MCV RBC AUTO: 90 FL (ref 79–97)
MONOCYTES # BLD AUTO: 0.69 10*3/MM3 (ref 0.1–0.9)
MONOCYTES NFR BLD AUTO: 9.8 % (ref 5–12)
NEUTROPHILS NFR BLD AUTO: 4.53 10*3/MM3 (ref 1.7–7)
NEUTROPHILS NFR BLD AUTO: 64.4 % (ref 42.7–76)
NRBC BLD AUTO-RTO: 0 /100 WBC (ref 0–0.2)
PLATELET # BLD AUTO: 83 10*3/MM3 (ref 140–450)
PMV BLD AUTO: 10.6 FL (ref 6–12)
POTASSIUM SERPL-SCNC: 4.2 MMOL/L (ref 3.5–5.2)
PROCALCITONIN SERPL-MCNC: 0.11 NG/ML (ref 0–0.25)
PROT SERPL-MCNC: 6.3 G/DL (ref 6–8.5)
RBC # BLD AUTO: 4.42 10*6/MM3 (ref 3.77–5.28)
SODIUM SERPL-SCNC: 138 MMOL/L (ref 136–145)
WBC NRBC COR # BLD: 7.04 10*3/MM3 (ref 3.4–10.8)

## 2023-04-16 PROCEDURE — 25010000002 MORPHINE PER 10 MG: Performed by: INTERNAL MEDICINE

## 2023-04-16 PROCEDURE — 84145 PROCALCITONIN (PCT): CPT | Performed by: INTERNAL MEDICINE

## 2023-04-16 PROCEDURE — 80053 COMPREHEN METABOLIC PANEL: CPT | Performed by: INTERNAL MEDICINE

## 2023-04-16 PROCEDURE — G0378 HOSPITAL OBSERVATION PER HR: HCPCS

## 2023-04-16 PROCEDURE — 94799 UNLISTED PULMONARY SVC/PX: CPT

## 2023-04-16 PROCEDURE — 96372 THER/PROPH/DIAG INJ SC/IM: CPT

## 2023-04-16 PROCEDURE — 25010000002 HEPARIN (PORCINE) PER 1000 UNITS: Performed by: STUDENT IN AN ORGANIZED HEALTH CARE EDUCATION/TRAINING PROGRAM

## 2023-04-16 PROCEDURE — 0 CEFTRIAXONE PER 250 MG: Performed by: STUDENT IN AN ORGANIZED HEALTH CARE EDUCATION/TRAINING PROGRAM

## 2023-04-16 PROCEDURE — 96376 TX/PRO/DX INJ SAME DRUG ADON: CPT

## 2023-04-16 PROCEDURE — 99233 SBSQ HOSP IP/OBS HIGH 50: CPT | Performed by: INTERNAL MEDICINE

## 2023-04-16 PROCEDURE — 85025 COMPLETE CBC W/AUTO DIFF WBC: CPT | Performed by: STUDENT IN AN ORGANIZED HEALTH CARE EDUCATION/TRAINING PROGRAM

## 2023-04-16 RX ORDER — FUROSEMIDE 40 MG/1
40 TABLET ORAL
Status: DISCONTINUED | OUTPATIENT
Start: 2023-04-16 | End: 2023-04-18 | Stop reason: HOSPADM

## 2023-04-16 RX ADMIN — Medication 10 ML: at 16:51

## 2023-04-16 RX ADMIN — MORPHINE SULFATE 2 MG: 2 INJECTION, SOLUTION INTRAMUSCULAR; INTRAVENOUS at 16:51

## 2023-04-16 RX ADMIN — Medication 10 ML: at 08:23

## 2023-04-16 RX ADMIN — HEPARIN SODIUM 5000 UNITS: 5000 INJECTION INTRAVENOUS; SUBCUTANEOUS at 08:13

## 2023-04-16 RX ADMIN — MORPHINE SULFATE 2 MG: 2 INJECTION, SOLUTION INTRAMUSCULAR; INTRAVENOUS at 08:22

## 2023-04-16 RX ADMIN — FUROSEMIDE 40 MG: 40 TABLET ORAL at 08:12

## 2023-04-16 RX ADMIN — Medication 10 ML: at 21:59

## 2023-04-16 RX ADMIN — HEPARIN SODIUM 5000 UNITS: 5000 INJECTION INTRAVENOUS; SUBCUTANEOUS at 21:58

## 2023-04-16 RX ADMIN — GABAPENTIN 300 MG: 300 CAPSULE ORAL at 21:58

## 2023-04-16 RX ADMIN — CEFTRIAXONE SODIUM 2 G: 2 INJECTION, SOLUTION INTRAVENOUS at 21:59

## 2023-04-16 RX ADMIN — SPIRONOLACTONE 100 MG: 25 TABLET ORAL at 08:12

## 2023-04-16 RX ADMIN — MORPHINE SULFATE 2 MG: 2 INJECTION, SOLUTION INTRAMUSCULAR; INTRAVENOUS at 22:09

## 2023-04-16 RX ADMIN — GABAPENTIN 300 MG: 300 CAPSULE ORAL at 08:12

## 2023-04-16 RX ADMIN — LACTULOSE 20 G: 20 SOLUTION ORAL at 09:33

## 2023-04-16 RX ADMIN — GABAPENTIN 300 MG: 300 CAPSULE ORAL at 16:52

## 2023-04-16 RX ADMIN — MORPHINE SULFATE 2 MG: 2 INJECTION, SOLUTION INTRAMUSCULAR; INTRAVENOUS at 12:43

## 2023-04-16 RX ADMIN — PANTOPRAZOLE SODIUM 40 MG: 40 TABLET, DELAYED RELEASE ORAL at 05:38

## 2023-04-16 RX ADMIN — FUROSEMIDE 40 MG: 40 TABLET ORAL at 17:22

## 2023-04-16 RX ADMIN — HYDROXYZINE PAMOATE 25 MG: 25 CAPSULE ORAL at 21:58

## 2023-04-16 NOTE — PROGRESS NOTES
[facility]   Hospitalist Progress Note  Date: 2023  Patient Name: Lara Brock  : 1975  MRN: [id number]  Date of admission: 2023      Subjective   Subjective     Chief Complaint: Abdominal pain    Summary: 49 yo female with h/o narcotic abuse, cirrhosis who was recently admitted to [facility] on - for ascites.  She had a paracentesis and per report no signs of SBP on that fluid.  She says she also had pneumonia and was given what IV antibiotics in the hospital.  She was prescribed amoxicillin (or Augmentin?) upon dc.  The next day she came in with vomiting and abdominal pain and was transferred here for further workup.  She does not want a repeat para.    Interval Followup: Still with some mild abdominal pain but overall improved since admission.    Review of Systems   All systems were reviewed and negative except for what is outlined above    Objective   Objective     Vitals:   Temp:  [98.2 °F (36.8 °C)-98.5 °F (36.9 °C)] 98.5 °F (36.9 °C)  Heart Rate:  [] 108  Resp:  [18-20] 20  BP: (133-162)/(75-92) 133/79  Physical Exam    Constitutional: Awake, alert, no acute distress, nontoxic appearing   Eyes: Pupils equal, sclerae anicteric, no conjunctival injection   HENT: NCAT, MMM   Neck: Supple, no thyromegaly, no lymphadenopathy, trachea midline   Respiratory: Clear to auscultation bilaterally, nonlabored respirations    Cardiovascular: RRR, no m   Gastrointestinal: sndnt +BS; para site dressed   Musculoskeletal: No bilateral ankle edema, no clubbing or cyanosis to extremities   Psychiatric: Appropriate affect, cooperative   Neurologic: Oriented x 3, speech clear   Skin: No rashes     Result Review    Result Review:  I have reviewed the following:  [x]  Laboratory   CBC        2023    22:59 4/15/2023    08:37 2023    04:32   CBC   WBC 13.15   11.23   7.04     RBC 4.79   4.44   4.42     Hemoglobin 14.8   13.9   13.6     Hematocrit 42.5   39.1   39.8     MCV 88.7    88.1   90.0     MCH 30.9   31.3   30.8     MCHC 34.8   35.5   34.2     RDW 15.4   15.6   15.7     Platelets 118   110   83       CMP        4/14/2023    21:04 4/15/2023    08:37 4/16/2023    04:32   CMP   Glucose 131   134   94     BUN 10   9   8     Creatinine 0.79   0.68   0.71     EGFR 91.8   106.9   104.4     Sodium 137   135   138     Potassium 4.1   3.7   4.2     Chloride 100   100   102     Calcium 8.6   8.4   8.3     Total Protein 7.1    6.3     Albumin 3.1    2.8     Globulin   3.5     Total Bilirubin 1.5    0.8     Alkaline Phosphatase 151    145     AST (SGOT) 36    39     ALT (SGPT) 29    29     Albumin/Globulin Ratio   0.8     BUN/Creatinine Ratio 12.7   13.2   11.3     Anion Gap 7.0   8.0   11.9         []  Microbiology  []  Radiology  []  EKG/Telemetry   []  Cardiology/Vascular   []  Pathology  []  Old records  []  Other:    Assessment & Plan   Assessment / Plan     Assessment:  • Abdominal pain  • Cirrhosis with ascites  • Mild leukocytosis, resolved  • Recent pneumonia per patient  • Possible UTI    Plan:  · Transferred to [facility] per request of [place] [facility]  · Continue current antibiotics  · Patient did not want another paracentesis  · Await urine culture sensitivity  · Anticipate discharge tomorrow on course of oral antibiotics    Discussed plan with RN.    DVT prophylaxis:  Medical DVT prophylaxis orders are present.    CODE STATUS:   Code Status (Patient has no pulse and is not breathing): CPR (Attempt to Resuscitate)  Medical Interventions (Patient has pulse or is breathing): Full Support      Electronically signed by Esdras Gates MD, 04/16/23, 10:27 AM EDT.

## 2023-04-16 NOTE — PLAN OF CARE
Goal Outcome Evaluation:   Patient pain continues. Medication given per orders. Able to ambulate to and from bathroom independently

## 2023-04-17 ENCOUNTER — APPOINTMENT (OUTPATIENT)
Dept: INTERVENTIONAL RADIOLOGY/VASCULAR | Facility: HOSPITAL | Age: 48
End: 2023-04-17
Payer: MEDICAID

## 2023-04-17 LAB
ALBUMIN SERPL-MCNC: 2.8 G/DL (ref 3.5–5.2)
ALBUMIN/GLOB SERPL: 0.8 G/DL
ALP SERPL-CCNC: 147 U/L (ref 39–117)
ALT SERPL W P-5'-P-CCNC: 31 U/L (ref 1–33)
ANION GAP SERPL CALCULATED.3IONS-SCNC: 8 MMOL/L (ref 5–15)
AST SERPL-CCNC: 35 U/L (ref 1–32)
BASOPHILS # BLD AUTO: 0.03 10*3/MM3 (ref 0–0.2)
BASOPHILS NFR BLD AUTO: 0.5 % (ref 0–1.5)
BILIRUB SERPL-MCNC: 0.8 MG/DL (ref 0–1.2)
BUN SERPL-MCNC: 9 MG/DL (ref 6–20)
BUN/CREAT SERPL: 14.5 (ref 7–25)
CALCIUM SPEC-SCNC: 8.1 MG/DL (ref 8.6–10.5)
CHLORIDE SERPL-SCNC: 100 MMOL/L (ref 98–107)
CO2 SERPL-SCNC: 28 MMOL/L (ref 22–29)
CREAT SERPL-MCNC: 0.62 MG/DL (ref 0.57–1)
DEPRECATED RDW RBC AUTO: 50.4 FL (ref 37–54)
EGFRCR SERPLBLD CKD-EPI 2021: 109.3 ML/MIN/1.73
EOSINOPHIL # BLD AUTO: 0.09 10*3/MM3 (ref 0–0.4)
EOSINOPHIL NFR BLD AUTO: 1.4 % (ref 0.3–6.2)
ERYTHROCYTE [DISTWIDTH] IN BLOOD BY AUTOMATED COUNT: 15.4 % (ref 12.3–15.4)
GLOBULIN UR ELPH-MCNC: 3.5 GM/DL
GLUCOSE SERPL-MCNC: 163 MG/DL (ref 65–99)
HCT VFR BLD AUTO: 38.8 % (ref 34–46.6)
HGB BLD-MCNC: 13.1 G/DL (ref 12–15.9)
IMM GRANULOCYTES # BLD AUTO: 0.01 10*3/MM3 (ref 0–0.05)
IMM GRANULOCYTES NFR BLD AUTO: 0.2 % (ref 0–0.5)
LARGE PLATELETS: NORMAL
LYMPHOCYTES # BLD AUTO: 1.39 10*3/MM3 (ref 0.7–3.1)
LYMPHOCYTES NFR BLD AUTO: 21.8 % (ref 19.6–45.3)
MCH RBC QN AUTO: 30.7 PG (ref 26.6–33)
MCHC RBC AUTO-ENTMCNC: 33.8 G/DL (ref 31.5–35.7)
MCV RBC AUTO: 90.9 FL (ref 79–97)
MONOCYTES # BLD AUTO: 0.56 10*3/MM3 (ref 0.1–0.9)
MONOCYTES NFR BLD AUTO: 8.8 % (ref 5–12)
NEUTROPHILS NFR BLD AUTO: 4.29 10*3/MM3 (ref 1.7–7)
NEUTROPHILS NFR BLD AUTO: 67.3 % (ref 42.7–76)
NRBC BLD AUTO-RTO: 0 /100 WBC (ref 0–0.2)
PLATELET # BLD AUTO: 68 10*3/MM3 (ref 140–450)
PMV BLD AUTO: 10.7 FL (ref 6–12)
POTASSIUM SERPL-SCNC: 4 MMOL/L (ref 3.5–5.2)
PROT SERPL-MCNC: 6.3 G/DL (ref 6–8.5)
RBC # BLD AUTO: 4.27 10*6/MM3 (ref 3.77–5.28)
RBC MORPH BLD: NORMAL
SMALL PLATELETS BLD QL SMEAR: NORMAL
SODIUM SERPL-SCNC: 136 MMOL/L (ref 136–145)
WBC MORPH BLD: NORMAL
WBC NRBC COR # BLD: 6.37 10*3/MM3 (ref 3.4–10.8)

## 2023-04-17 PROCEDURE — 99232 SBSQ HOSP IP/OBS MODERATE 35: CPT | Performed by: INTERNAL MEDICINE

## 2023-04-17 PROCEDURE — G0378 HOSPITAL OBSERVATION PER HR: HCPCS

## 2023-04-17 PROCEDURE — 85025 COMPLETE CBC W/AUTO DIFF WBC: CPT | Performed by: INTERNAL MEDICINE

## 2023-04-17 PROCEDURE — 96372 THER/PROPH/DIAG INJ SC/IM: CPT

## 2023-04-17 PROCEDURE — 85007 BL SMEAR W/DIFF WBC COUNT: CPT | Performed by: INTERNAL MEDICINE

## 2023-04-17 PROCEDURE — 25010000002 MORPHINE PER 10 MG: Performed by: INTERNAL MEDICINE

## 2023-04-17 PROCEDURE — 94799 UNLISTED PULMONARY SVC/PX: CPT

## 2023-04-17 PROCEDURE — 80053 COMPREHEN METABOLIC PANEL: CPT | Performed by: INTERNAL MEDICINE

## 2023-04-17 PROCEDURE — 99233 SBSQ HOSP IP/OBS HIGH 50: CPT | Performed by: INTERNAL MEDICINE

## 2023-04-17 PROCEDURE — 96376 TX/PRO/DX INJ SAME DRUG ADON: CPT

## 2023-04-17 PROCEDURE — 25010000002 HEPARIN (PORCINE) PER 1000 UNITS: Performed by: STUDENT IN AN ORGANIZED HEALTH CARE EDUCATION/TRAINING PROGRAM

## 2023-04-17 RX ORDER — LIDOCAINE HYDROCHLORIDE 20 MG/ML
20 INJECTION, SOLUTION INFILTRATION; PERINEURAL ONCE
Status: DISCONTINUED | OUTPATIENT
Start: 2023-04-17 | End: 2023-04-17

## 2023-04-17 RX ORDER — SIMETHICONE 80 MG
80 TABLET,CHEWABLE ORAL ONCE
Status: COMPLETED | OUTPATIENT
Start: 2023-04-17 | End: 2023-04-17

## 2023-04-17 RX ADMIN — HEPARIN SODIUM 5000 UNITS: 5000 INJECTION INTRAVENOUS; SUBCUTANEOUS at 20:57

## 2023-04-17 RX ADMIN — Medication 10 ML: at 20:57

## 2023-04-17 RX ADMIN — Medication 10 ML: at 09:58

## 2023-04-17 RX ADMIN — SIMETHICONE 80 MG: 80 TABLET, CHEWABLE ORAL at 09:57

## 2023-04-17 RX ADMIN — MORPHINE SULFATE 2 MG: 2 INJECTION, SOLUTION INTRAMUSCULAR; INTRAVENOUS at 05:38

## 2023-04-17 RX ADMIN — LACTULOSE 20 G: 20 SOLUTION ORAL at 10:00

## 2023-04-17 RX ADMIN — SPIRONOLACTONE 100 MG: 25 TABLET ORAL at 09:55

## 2023-04-17 RX ADMIN — MORPHINE SULFATE 2 MG: 2 INJECTION, SOLUTION INTRAMUSCULAR; INTRAVENOUS at 09:54

## 2023-04-17 RX ADMIN — PANTOPRAZOLE SODIUM 40 MG: 40 TABLET, DELAYED RELEASE ORAL at 05:38

## 2023-04-17 RX ADMIN — HEPARIN SODIUM 5000 UNITS: 5000 INJECTION INTRAVENOUS; SUBCUTANEOUS at 09:54

## 2023-04-17 RX ADMIN — GABAPENTIN 300 MG: 300 CAPSULE ORAL at 20:57

## 2023-04-17 RX ADMIN — MORPHINE SULFATE 2 MG: 2 INJECTION, SOLUTION INTRAMUSCULAR; INTRAVENOUS at 14:35

## 2023-04-17 RX ADMIN — FUROSEMIDE 40 MG: 40 TABLET ORAL at 17:20

## 2023-04-17 RX ADMIN — GABAPENTIN 300 MG: 300 CAPSULE ORAL at 09:55

## 2023-04-17 RX ADMIN — FUROSEMIDE 40 MG: 40 TABLET ORAL at 09:55

## 2023-04-17 RX ADMIN — GABAPENTIN 300 MG: 300 CAPSULE ORAL at 16:10

## 2023-04-17 NOTE — NURSING NOTE
END OF SHIFT:  Pt alert and oriented. Refused procedure. No other refusals noted.   IV: C/D/I patent and flushes with ease.    Changes:No new changes noted this shift    Pain control: RTC pain medication administered for abdominal pain.    Progressing to goal: Continue care plan    Discharge: medical approval.

## 2023-04-17 NOTE — PLAN OF CARE
Problem: Adult Inpatient Plan of Care  Goal: Plan of Care Review  Outcome: Ongoing, Progressing  Goal: Patient-Specific Goal (Individualized)  Outcome: Ongoing, Progressing  Goal: Absence of Hospital-Acquired Illness or Injury  Outcome: Ongoing, Progressing  Intervention: Identify and Manage Fall Risk  Recent Flowsheet Documentation  Taken 4/17/2023 1300 by Ashia Nicolas RN  Safety Promotion/Fall Prevention: safety round/check completed  Taken 4/17/2023 1200 by Ashia Nicolas RN  Safety Promotion/Fall Prevention: safety round/check completed  Taken 4/17/2023 1024 by Ashia Nicolas RN  Safety Promotion/Fall Prevention: safety round/check completed  Taken 4/17/2023 0800 by Ashia Nicolas RN  Safety Promotion/Fall Prevention: safety round/check completed  Intervention: Prevent Skin Injury  Recent Flowsheet Documentation  Taken 4/17/2023 0954 by Ashia Nicolas RN  Body Position: position changed independently  Intervention: Prevent and Manage VTE (Venous Thromboembolism) Risk  Recent Flowsheet Documentation  Taken 4/17/2023 0954 by Ashia Nicolas RN  Activity Management: up ad elon  VTE Prevention/Management: (See Mar) other (see comments)  Range of Motion: active ROM (range of motion) encouraged  Intervention: Prevent Infection  Recent Flowsheet Documentation  Taken 4/17/2023 0954 by Ashia Nicolas RN  Infection Prevention:   hand hygiene promoted   personal protective equipment utilized   rest/sleep promoted   single patient room provided  Goal: Optimal Comfort and Wellbeing  Outcome: Ongoing, Progressing  Intervention: Monitor Pain and Promote Comfort  Recent Flowsheet Documentation  Taken 4/17/2023 0954 by Ashia Nicolas RN  Pain Management Interventions: see MAR  Intervention: Provide Person-Centered Care  Recent Flowsheet Documentation  Taken 4/17/2023 0954 by Ashia Nicolas RN  Trust Relationship/Rapport:   care explained   questions answered   questions encouraged    thoughts/feelings acknowledged  Goal: Readiness for Transition of Care  Outcome: Ongoing, Progressing     Problem: Adjustment to Illness (Liver Failure)  Goal: Optimal Coping with Liver Failure  Outcome: Ongoing, Progressing  Intervention: Support Response to Liver Disease  Recent Flowsheet Documentation  Taken 4/17/2023 0954 by Ashia Nicolas RN  Supportive Measures:   active listening utilized   self-care encouraged  Diversional Activities:   television   smartphone     Problem: Fluid and Electrolyte Imbalance (Liver Failure)  Goal: Fluid and Electrolyte Balance  Outcome: Ongoing, Progressing     Problem: Gastrointestinal Complications (Liver Failure)  Goal: Optimal Gastrointestinal Function  Outcome: Ongoing, Progressing  Intervention: Monitor and Support Gastrointestinal Function  Recent Flowsheet Documentation  Taken 4/17/2023 0954 by Ashia Nicolas RN  Body Position: position changed independently     Problem: Impaired Coagulation (Liver Failure)  Goal: Optimal Coagulation Function  Outcome: Ongoing, Progressing     Problem: Infection (Liver Failure)  Goal: Absence of Infection Signs and Symptoms  Outcome: Ongoing, Progressing  Intervention: Prevent or Manage Infection  Recent Flowsheet Documentation  Taken 4/17/2023 0954 by Ashia Nicolas RN  Isolation Precautions: (Yellow medication)   other (see comments)   precautions maintained     Problem: Neurologic Function Impaired (Liver Failure)  Goal: Optimal Neurologic Function  Outcome: Ongoing, Progressing     Problem: Oral Intake Inadequate (Liver Failure)  Goal: Improved Oral Intake  Outcome: Ongoing, Progressing     Problem: Pain (Liver Failure)  Goal: Optimal Pain Control  Outcome: Ongoing, Progressing  Intervention: Prevent or Manage Pain  Recent Flowsheet Documentation  Taken 4/17/2023 0954 by Ashia Nicolas RN  Pain Management Interventions: see MAR  Sleep/Rest Enhancement:   awakenings minimized   regular sleep/rest pattern promoted      Problem: Renal Dysfunction (Liver Failure)  Goal: Optimize Renal Function  Outcome: Ongoing, Progressing     Problem: Respiratory Compromise (Liver Failure)  Goal: Effective Oxygenation and Ventilation  Outcome: Ongoing, Progressing  Intervention: Optimize Oxygenation and Ventilation  Recent Flowsheet Documentation  Taken 4/17/2023 0954 by Ashia Nicolas, RN  Head of Bed (HOB) Positioning: HOB elevated  Intervention: Promote Airway Secretion Clearance  Recent Flowsheet Documentation  Taken 4/17/2023 0954 by Ashia Nicolas, RN  Activity Management: up ad leon  Cough And Deep Breathing: done independently per patient   Goal Outcome Evaluation:

## 2023-04-17 NOTE — NURSING NOTE
Pt stated sister is suppose to call the facility and talk to doctor about paracentesis. Sister called and said she called the office and could not reach doctor. Informed Sister that pt has been transported down to get paracentesis done and if pt had any questions or concerns she could voice them to the doctor before procedure.

## 2023-04-17 NOTE — NURSING NOTE
Pt arrived to the floor and stated that she didn't let them do the paracentesis, she knows her body and knows she does not need fluid taken off and that she's going home tomorrow.

## 2023-04-17 NOTE — CASE MANAGEMENT/SOCIAL WORK
Discharge Planning Assessment   Esteban     Patient Name: Lara Brock  MRN: [id number]  Today's Date: 4/17/2023    Admit Date: 4/14/2023     Discharge Needs Assessment     Row Name 04/17/23 1420       Living Environment    People in Home alone    Unique Family Situation Pt lives in [facility] alone.    Current Living Arrangements apartment    Primary Care Provided by self    Provides Primary Care For no one    Family Caregiver if Needed sibling(s)    Family Caregiver Names Mariaa Viramontes- Sister    Quality of Family Relationships supportive;involved;helpful    Able to Return to Prior Arrangements yes       Resource/Environmental Concerns    Resource/Environmental Concerns none       Transition Planning    Patient/Family Anticipates Transition to home    Patient/Family Anticipated Services at Transition durable medical equipment    Transportation Anticipated family or friend will provide       Discharge Needs Assessment    Readmission Within the Last 30 Days no previous admission in last 30 days    Equipment Currently Used at Home none    Equipment Needed After Discharge hospital bed               Discharge Plan     Row Name 04/17/23 1421       Plan    Plan Pt admitted for abdominal pain- positive for cirrhosis. SW met with pt to assess needs. Pt lives at home alone and reports independence in ADLs. States her sister is very supportive and assists as needed. Discussed discharge plans and pt plans to return home once stable, denies the need for rehab or HHC. Pt does request a hospital bed at home. States due to the swelling in her abdomen it is sometimes hard for her to get up from bed. SW notified her that we can attempt to arranged this, pending insurance approval. Pt v/uJose Angel KERR MD entered room during SW visit. Will follow.    Patient/Family in Agreement with Plan yes              Expected Discharge Date and Time     Expected Discharge Date Expected Discharge Time    Apr 18, 2023          Demographic Summary      Row Name 04/17/23 1418       General Information    Admission Type observation    Arrived From hospital    Referral Source admission list    Reason for Consult discharge planning    Preferred Language English       Contact Information    Permission Granted to Share Info With family/designee               Functional Status     Row Name 04/17/23 1419       Functional Status    Usual Activity Tolerance moderate    Current Activity Tolerance moderate       Functional Status, IADL    Medications independent    Meal Preparation independent    Housekeeping independent    Laundry independent    Shopping assistive person       Mental Status    General Appearance WDL WDL       Mental Status Summary    Recent Changes in Mental Status/Cognitive Functioning no changes               Psychosocial     Row Name 04/17/23 1419       Behavior WDL    Behavior WDL WDL       Emotion Mood WDL    Emotion/Mood/Affect WDL WDL       Speech WDL    Speech WDL WDL       Perceptual State WDL    Perceptual State WDL WDL       Thought Process WDL    Thought Process WDL WDL       Intellectual Performance WDL    Intellectual Performance WDL WDL       Coping/Stress    Sources of Support sibling(s)    Techniques to [place] with Loss/Stress/Change not applicable    Reaction to Health Status accepting    Understanding of Condition and Treatment adequate understanding of medical condition       Developmental Stage (Eriksson's)    Developmental Stage Stage 7 (35-65 years/Middle Adulthood) Generativity vs. Stagnation              JAYNE Negron

## 2023-04-17 NOTE — PROGRESS NOTES
[facility]   Hospitalist Progress Note  Date: 2023  Patient Name: Lara Brock  : 1975  MRN: [id number]  Date of admission: 2023      Subjective   Subjective     Chief Complaint: Abdominal pain    Summary: 47 yo female with h/o narcotic abuse, cirrhosis who was recently admitted to [facility] on - for ascites.  She had a paracentesis and per report no signs of SBP on that fluid.  She says she also had pneumonia and was given what IV antibiotics in the hospital.  She was prescribed amoxicillin (or Augmentin?) upon dc.  The next day she came in with vomiting and abdominal pain and was transferred here for further workup.  She does not want a repeat para.    Interval Followup: complains of abdominal pain still, but wants to go home, wants a gas-x, says she was told she couldn't get another paracentesis.      Review of Systems   All systems were reviewed and negative except for what is outlined above    Objective   Objective     Vitals:   Temp:  [97.7 °F (36.5 °C)-98.4 °F (36.9 °C)] 97.7 °F (36.5 °C)  Heart Rate:  [] 112  Resp:  [16-20] 16  BP: (118-166)/() 154/100  Physical Exam    Constitutional: Awake, alert, no acute distress, nontoxic appearing   Eyes: Pupils equal, sclerae anicteric, no conjunctival injection   HENT: NCAT, MMM   Neck: Supple, no thyromegaly, no lymphadenopathy, trachea midline   Respiratory: Clear to auscultation bilaterally, nonlabored respirations    Cardiovascular: RRR, no m   Gastrointestinal: sndnt +BS; para site dressed   Musculoskeletal: No bilateral ankle edema, no clubbing or cyanosis to extremities   Psychiatric: Appropriate affect, cooperative   Neurologic: Oriented x 3, speech clear   Skin: No rashes     Result Review    Result Review:  I have reviewed the following:  [x]  Laboratory   CBC        4/15/2023    08:37 2023    04:32 2023    08:28   CBC   WBC 11.23   7.04   6.37     RBC 4.44   4.42   4.27     Hemoglobin 13.9    "13.6   13.1     Hematocrit 39.1   39.8   38.8     MCV 88.1   90.0   90.9     MCH 31.3   30.8   30.7     MCHC 35.5   34.2   33.8     RDW 15.6   15.7   15.4     Platelets 110   83   68       CMP        4/15/2023    08:37 4/16/2023    04:32 4/17/2023    08:27   CMP   Glucose 134   94   163     BUN 9   8   9     Creatinine 0.68   0.71   0.62     EGFR 106.9   104.4   109.3     Sodium 135   138   136     Potassium 3.7   4.2   4.0     Chloride 100   102   100     Calcium 8.4   8.3   8.1     Total Protein  6.3   6.3     Albumin  2.8   2.8     Globulin  3.5   3.5     Total Bilirubin  0.8   0.8     Alkaline Phosphatase  145   147     AST (SGOT)  39   35     ALT (SGPT)  29   31     Albumin/Globulin Ratio  0.8   0.8     BUN/Creatinine Ratio 13.2   11.3   14.5     Anion Gap 8.0   11.9   8.0         []  Microbiology  []  Radiology  []  EKG/Telemetry   []  Cardiology/Vascular   []  Pathology  []  Old records  []  Other:    Assessment & Plan   Assessment / Plan     Assessment:  • Abdominal pain  • Cirrhosis with ascites  • Mild leukocytosis, resolved  • Recent pneumonia per patient  • Possible UTI    Plan:  · Transferred to [facility] per request of [facility] ED MD  · Per GI note patient did not want another paracentesis, she says she is not \"allowed\" to.  · Will consult GI.  · Gas-X    Discussed plan with RN.    DVT prophylaxis:  Medical DVT prophylaxis orders are present.    CODE STATUS:   Code Status (Patient has no pulse and is not breathing): CPR (Attempt to Resuscitate)  Medical Interventions (Patient has pulse or is breathing): Full Support    Electronically signed by Esdras Gates MD, 04/17/23, 10:28 AM EDT.               "

## 2023-04-17 NOTE — PLAN OF CARE
Problem: Adult Inpatient Plan of Care  Goal: Plan of Care Review  Outcome: Ongoing, Progressing  Goal: Patient-Specific Goal (Individualized)  Outcome: Ongoing, Progressing  Goal: Absence of Hospital-Acquired Illness or Injury  Outcome: Ongoing, Progressing  Intervention: Identify and Manage Fall Risk  Recent Flowsheet Documentation  Taken 4/17/2023 1300 by Ashia Nicolas RN  Safety Promotion/Fall Prevention: safety round/check completed  Taken 4/17/2023 1200 by Ashia Nicolas RN  Safety Promotion/Fall Prevention: safety round/check completed  Taken 4/17/2023 1024 by Ashia Nicolas RN  Safety Promotion/Fall Prevention: safety round/check completed  Taken 4/17/2023 0800 by Ashia Nicolas RN  Safety Promotion/Fall Prevention: safety round/check completed  Intervention: Prevent Skin Injury  Recent Flowsheet Documentation  Taken 4/17/2023 0954 by Ashia Nicolas RN  Body Position: position changed independently  Intervention: Prevent and Manage VTE (Venous Thromboembolism) Risk  Recent Flowsheet Documentation  Taken 4/17/2023 0954 by Ashia Nicolas RN  Activity Management: up ad leon  VTE Prevention/Management: (See Mar) other (see comments)  Range of Motion: active ROM (range of motion) encouraged  Intervention: Prevent Infection  Recent Flowsheet Documentation  Taken 4/17/2023 0954 by Ashia Nicolas RN  Infection Prevention:   hand hygiene promoted   personal protective equipment utilized   rest/sleep promoted   single patient room provided  Goal: Optimal Comfort and Wellbeing  Outcome: Ongoing, Progressing  Intervention: Monitor Pain and Promote Comfort  Recent Flowsheet Documentation  Taken 4/17/2023 0954 by Ashia Nicolas RN  Pain Management Interventions: see MAR  Intervention: Provide Person-Centered Care  Recent Flowsheet Documentation  Taken 4/17/2023 0954 by Ashia Nicolas RN  Trust Relationship/Rapport:   care explained   questions answered   questions encouraged    thoughts/feelings acknowledged  Goal: Readiness for Transition of Care  Outcome: Ongoing, Progressing     Problem: Adjustment to Illness (Liver Failure)  Goal: Optimal Coping with Liver Failure  Outcome: Ongoing, Progressing  Intervention: Support Response to Liver Disease  Recent Flowsheet Documentation  Taken 4/17/2023 0954 by Ashia Nicolas RN  Supportive Measures:   active listening utilized   self-care encouraged  Diversional Activities:   television   smartphone     Problem: Fluid and Electrolyte Imbalance (Liver Failure)  Goal: Fluid and Electrolyte Balance  Outcome: Ongoing, Progressing     Problem: Gastrointestinal Complications (Liver Failure)  Goal: Optimal Gastrointestinal Function  Outcome: Ongoing, Progressing  Intervention: Monitor and Support Gastrointestinal Function  Recent Flowsheet Documentation  Taken 4/17/2023 0954 by Ashia Nicolas RN  Body Position: position changed independently     Problem: Impaired Coagulation (Liver Failure)  Goal: Optimal Coagulation Function  Outcome: Ongoing, Progressing     Problem: Infection (Liver Failure)  Goal: Absence of Infection Signs and Symptoms  Outcome: Ongoing, Progressing  Intervention: Prevent or Manage Infection  Recent Flowsheet Documentation  Taken 4/17/2023 0954 by Ashia Nicolas RN  Isolation Precautions: (Yellow medication)   other (see comments)   precautions maintained     Problem: Neurologic Function Impaired (Liver Failure)  Goal: Optimal Neurologic Function  Outcome: Ongoing, Progressing     Problem: Oral Intake Inadequate (Liver Failure)  Goal: Improved Oral Intake  Outcome: Ongoing, Progressing     Problem: Pain (Liver Failure)  Goal: Optimal Pain Control  Outcome: Ongoing, Progressing  Intervention: Prevent or Manage Pain  Recent Flowsheet Documentation  Taken 4/17/2023 0954 by Ashia Nicolas RN  Pain Management Interventions: see MAR  Sleep/Rest Enhancement:   awakenings minimized   regular sleep/rest pattern promoted      Problem: Renal Dysfunction (Liver Failure)  Goal: Optimize Renal Function  Outcome: Ongoing, Progressing     Problem: Respiratory Compromise (Liver Failure)  Goal: Effective Oxygenation and Ventilation  Outcome: Ongoing, Progressing  Intervention: Optimize Oxygenation and Ventilation  Recent Flowsheet Documentation  Taken 4/17/2023 0954 by Ashia Nicolas, RN  Head of Bed (HOB) Positioning: HOB elevated  Intervention: Promote Airway Secretion Clearance  Recent Flowsheet Documentation  Taken 4/17/2023 0954 by Ashia Nicolas, RN  Activity Management: up ad leon  Cough And Deep Breathing: done independently per patient   Goal Outcome Evaluation:

## 2023-04-18 ENCOUNTER — READMISSION MANAGEMENT (OUTPATIENT)
Dept: CALL CENTER | Facility: HOSPITAL | Age: 48
End: 2023-04-18
Payer: MEDICAID

## 2023-04-18 VITALS
WEIGHT: 127.21 LBS | TEMPERATURE: 97.3 F | OXYGEN SATURATION: 100 % | RESPIRATION RATE: 16 BRPM | DIASTOLIC BLOOD PRESSURE: 82 MMHG | HEART RATE: 101 BPM | HEIGHT: 58 IN | SYSTOLIC BLOOD PRESSURE: 148 MMHG | BODY MASS INDEX: 26.7 KG/M2

## 2023-04-18 LAB
ALPHA-FETOPROTEIN: 3.57 NG/ML (ref 0–8.3)
HOLD SPECIMEN: NORMAL
WHOLE BLOOD HOLD SPECIMEN: NORMAL

## 2023-04-18 PROCEDURE — 94799 UNLISTED PULMONARY SVC/PX: CPT

## 2023-04-18 PROCEDURE — 99239 HOSP IP/OBS DSCHRG MGMT >30: CPT | Performed by: INTERNAL MEDICINE

## 2023-04-18 PROCEDURE — 96372 THER/PROPH/DIAG INJ SC/IM: CPT

## 2023-04-18 PROCEDURE — 82105 ALPHA-FETOPROTEIN SERUM: CPT | Performed by: INTERNAL MEDICINE

## 2023-04-18 PROCEDURE — 25010000002 HEPARIN (PORCINE) PER 1000 UNITS: Performed by: STUDENT IN AN ORGANIZED HEALTH CARE EDUCATION/TRAINING PROGRAM

## 2023-04-18 PROCEDURE — G0378 HOSPITAL OBSERVATION PER HR: HCPCS

## 2023-04-18 PROCEDURE — 87522 HEPATITIS C REVRS TRNSCRPJ: CPT | Performed by: INTERNAL MEDICINE

## 2023-04-18 RX ADMIN — Medication 10 ML: at 10:08

## 2023-04-18 RX ADMIN — LACTULOSE 20 G: 20 SOLUTION ORAL at 10:07

## 2023-04-18 RX ADMIN — GABAPENTIN 300 MG: 300 CAPSULE ORAL at 10:07

## 2023-04-18 RX ADMIN — PANTOPRAZOLE SODIUM 40 MG: 40 TABLET, DELAYED RELEASE ORAL at 05:45

## 2023-04-18 RX ADMIN — SPIRONOLACTONE 100 MG: 25 TABLET ORAL at 10:08

## 2023-04-18 RX ADMIN — HEPARIN SODIUM 5000 UNITS: 5000 INJECTION INTRAVENOUS; SUBCUTANEOUS at 10:08

## 2023-04-18 RX ADMIN — FUROSEMIDE 40 MG: 40 TABLET ORAL at 10:07

## 2023-04-18 NOTE — DISCHARGE SUMMARY
[facility]         HOSPITALIST  DISCHARGE SUMMARY    Patient Name: Lara Brock  : 1975  MRN: [id number]    Date of Admission: 2023  Date of Discharge:  2023  Primary Care Physician: Soy Garcia MD    Consults     Date and Time Order Name Status Description    2023  8:33 PM Inpatient Gastroenterology Consult Completed           Active and Resolved Hospital Problems:  • Abdominal pain  • Cirrhosis with ascites  • Mild leukocytosis, resolved  • Recent pneumonia per patient  • Possible UTI    Hospital Course     Hospital Course:  47 yo female with h/o narcotic abuse, cirrhosis who was recently admitted to [facility] on - for ascites.  She had a paracentesis and per report no signs of SBP on that fluid.  She says she also had pneumonia and was given what IV antibiotics in the hospital.  She was prescribed amoxicillin (or Augmentin?) upon dc. The next day she came in with vomiting and abdominal pain and was transferred here for further workup.    Patient received simethicone with improvement in her abdominal pain, she does not want a repeat para that was offered by GI, she states her abdominal pain is much improved, she is at her baseline and is requesting discharge home.  Patient discharged home today in stable condition to follow-up with PCP and GI    Day of Discharge     Vital Signs:  Temp:  [97.3 °F (36.3 °C)-98.4 °F (36.9 °C)] 97.3 °F (36.3 °C)  Heart Rate:  [101-125] 101  Resp:  [16] 16  BP: (122-158)/() 148/82    Physical Exam:   GEN: NAD  CHEST: CTA YOSSI  CARD: RRR  NEURO: no focal deficits    Discharge Details        Discharge Medications      Continue These Medications      Instructions Start Date   furosemide 40 MG tablet  Commonly known as: LASIX   40 mg, Oral, Daily      gabapentin 300 MG capsule  Commonly known as: NEURONTIN   300 mg, Oral, 3 Times Daily      hydrOXYzine pamoate 25 MG capsule  Commonly known as: VISTARIL   25 mg, Oral,  3 Times Daily PRN      lactulose 10 GM/15ML solution  Commonly known as: CHRONULAC   20 g, Oral, Daily      omeprazole 20 MG capsule  Commonly known as: priLOSEC   20 mg, Oral, Daily      spironolactone 100 MG tablet  Commonly known as: ALDACTONE   100 mg, Oral, Daily         Stop These Medications    amoxicillin-clavulanate 875-125 MG per tablet  Commonly known as: AUGMENTIN            Allergies   Allergen Reactions   • Codeine Nausea Only       Discharge Disposition:  Home or Self Care    Diet:  Hospital:  Diet Order   Procedures   • Diet: Regular/House Diet; Texture: Regular Texture (IDDSI 7); Fluid Consistency: Thin (IDDSI 0)       Discharge Activity:       CODE STATUS:  Code Status and Medical Interventions:   Ordered at: 04/14/23 2038     Code Status (Patient has no pulse and is not breathing):    CPR (Attempt to Resuscitate)     Medical Interventions (Patient has pulse or is breathing):    Full Support       Future Appointments   Date Time Provider Department Center   7/31/2023 11:15 AM Nancy Cruz APRN [facility]       Additional Instructions for the Follow-ups that You Need to Schedule     Discharge Follow-up with PCP   As directed       Currently Documented PCP:    Soy Garcia MD    PCP Phone Number:    [phone]     Follow Up Details: 3 to 7 days         Discharge Follow-up with Specified Provider: GI; 2 Weeks   As directed      To: GI    Follow Up: 2 Weeks               Pertinent  and/or Most Recent Results     IMAGING:  Adult Transthoracic Echo Complete W/ Cont if Necessary Per Protocol    Result Date: 4/14/2023  This result has an attachment that is not available. DATE: ORDERED BY:  Maggie Heath MD CLINICAL HISTORY:  Reveals a 48-year-old with shortness of air. Study is somewhat technically difficult.  The parasternal windows are suboptimal. Grossly there appears to be normal left and right atrial chamber size.   There is mild thinning of the interatrial septum.  Grossly normal RV chamber size and function. LV cavity appears within normal diameter with normal wall thickening and normal wall motion.  EF calculated at 56%.  Not all wall segments are well visualized though.  E to A ratio at the mitral valve inflow suggests normal diastolic filling pattern.  Tissue Doppler at the mitral annulus suggests normal left atrial pressures. Mitral valve is minimally thickened but opens well.  No stenosis, prolapse, or obvious regurgitation.  Likewise, the aortic valve in suboptimal imaging appears pliable, it is probably trileaflet.  No insufficiency or stenosis.  Aortic roots within normal caliber. Pulmonic valve is not well seen. Tricuspid valve structurally normal.  Insufficient regurgitation to gauge RV systolic pressures, but there does appear to be trivial regurgitation. Limited Doppler at the interatrial septum.  No obvious shunting, but Doppler is very limited. No obvious pericardial effusion though there does appear to be a right-sided pericardial fat pad.  Cannot totally exclude a right-sided pleural effusion. The area is just not well delineated. IVC appears within normal diameter. Limited views of the thoracic and descending aorta appear within normal caliber. CONCLUSION:  Preserved RV and LV function.  No obvious wall motion abnormalities, though the imaging is somewhat limited.  EF is 56% with normal diastolic filling.  Cannot exclude a right-sided pleural effusion. REPORT #:  [id number]    CT Abdomen Pelvis Without Contrast    Result Date: 4/14/2023  STUDY:  CT ABDOMEN AND PELVIS WITHOUT CONTRAST REASON FOR EXAM:   Female, 48 years old.  Abdominal pain, acute, nonlocalized; Leaking fluid from paracentesis site RADIATION DOSAGE (If Supplied By Facility):  CTDIvol = ( 7.0 ) mGy, DLP = ( 354.5 ) mGycm TECHNIQUE:   Transaxial images were obtained from the dome of the diaphragm to the symphysis pubis without oral contrast, and without intravenous contrast.  Sagittal and coronal  images were reconstructed. Individualized dose optimization techniques were used for this CT. COMPARISON:   None. ___________________________________ FINDINGS: The visualized lung bases are unremarkable.  The visualized portions of the heart are within normal limits. Small amount of ascites. There is a diffuse contour abnormality of the liver consistent with cirrhotic changes.  There are surgical clips in the gallbladder fossa consistent with a prior cholecystectomy.  There is mild splenomegaly.  Normal pancreas. Normal bilateral adrenal glands. Normal right kidney.  Normal left kidney. Normal visualized stomach.  Normal small intestine.  Normal colon.  The appendix is visualized and appears normal. Normal abdominal aorta.  Normal inferior vena cava.  Normal retroperitoneum. Normal urinary bladder.  Status post bilateral tubal ligation. Edema of the subcutaneous fat consistent with anasarca.  Normal osseous structures. ___________________________________    Cirrhosis with a small amount of ascites but severe anasarca. Electronically Signed: Lencho Orr MD 2023/04/14 at 13:40 CDT Reading Location ID and State: 994 / [place] Tel [phone], Service support  [phone], Fax [phone]    XR Chest 1 View    Result Date: 4/12/2023  STUDY:   X-RAY CHEST REASON FOR EXAM:   Female, 48 years old.  Shortness of breath. TECHNIQUE:   AP COMPARISON:   4/11/2023 at 3:22 PM ___________________________________ FINDINGS: Slight interval worsening of left basilar airspace disease with further obscuring of the left hemidiaphragm. Otherwise similar to previous. No evidence of pneumothorax. Heart size remains normal. Calcified left mediastinal lymph nodes again demonstrated. No acute osseous abnormality. Gaseous distention of bowel in the upper abdomen probably a combination of stomach and colon not well visualized.    Slight interval worsening of left basilar airspace disease with further obscuring of the left hemidiaphragm.  This could represent new or enlarging small left pleural effusion with adjacent atelectasis. Pneumonia also possible. Electronically Signed: Jonathan Shook MD 2023/04/12 at 6:34 CDT Reading Location ID and State: [address] / [place] Tel [phone], Service support  [phone], Fax [phone]    XR Chest 1 View    Result Date: 4/11/2023  INDICATION: SHORTNESS OF BREATH EXAMINATION/TECHNIQUE: X-RAY - XR Chest 1 View COMPARISON: None. ____________________________________________ FINDINGS: LUNGS: Bilateral lower lung infiltrates and small pleural effusions. MEDIASTINUM AND CARDIOVASCULAR STRUCTURES: Cardiac silhouette not enlarged. Central airways and mediastinal contour are unremarkable.    Findings suggest developing multifocal pneumonia with small pleural effusions. Electronically Signed: Shaquille Siu MD 2023/04/11 at 15:32 CDT Reading Location ID and State: [address] / [place] Tel [phone], Service support  [phone], Fax [phone]    Ultrasound abdomen right upper quadrant    Result Date: 4/12/2023  STUDY:  ABDOMINAL ULTRASOUND - RIGHT UPPER QUADRANT REASON FOR VISIT:   Female, 48 years old  Cirrhosis TECHNIQUE:   Ultrasound evaluation of the right upper quadrant was performed with real-time and static gray-scale imaging. TECHNICAL  QUALITY:   Adequate. COMPARISON:   None. ___________________________________ FINDINGS: Liver:  The liver measures 11.7 cm.  There is a heterogeneous echogenicity of the liver.  The bile ducts are within normal limits.  There is hepatic color flow.  The direction of portal flow is hepatopetal.  There is no demonstrated mass lesion.  A small amount of ascites. Gallbladder:  The patient is status post cholecystectomy. s. Common Bile Duct (C.B.D.):   The common bile duct measures 4 mm. Pancreas:   Normal size of the head, body and tail of the pancreas.  There is normal echogenicity of the pancreas.   There is no demonstrated pancreatic mass or cyst. Right Kidney:  Normal size of the right  kidney.  The right kidney measures 8.1 cm.  Normal renal cortex.  The right cortex measures 0.9 cm.  There is no demonstrated renal mass or cyst.  There is no right hydronephrosis. ___________________________________    Cirrhosis with a small amount of ascites. Electronically Signed: Lencho Orr MD 2023/04/12 at 8:16 CDT Reading Location ID and State: [address] / [place] Tel [phone], Service support  [phone], Fax [phone]      LAB RESULTS:      Lab 04/17/23  0828 04/16/23  0432 04/15/23  0837 04/14/23  2259 04/14/23  2104   WBC 6.37 7.04 11.23* 13.15*  --    HEMOGLOBIN 13.1 13.6 13.9 14.8  --    HEMATOCRIT 38.8 39.8 39.1 42.5  --    PLATELETS 68* 83* 110* 118*  --    NEUTROS ABS 4.29 4.53 9.15* 11.18*  --    IMMATURE GRANS (ABS) 0.01 0.02 0.03 0.08*  --    LYMPHS ABS 1.39 1.67 1.40 1.30  --    MONOS ABS 0.56 0.69 0.62 0.56  --    EOS ABS 0.09 0.10 0.01 0.01  --    MCV 90.9 90.0 88.1 88.7  --    PROCALCITONIN  --  0.11  --   --   --    LACTATE  --   --   --   --  1.9         Lab 04/17/23  0827 04/16/23  0432 04/15/23  0837 04/14/23  2104   SODIUM 136 138 135* 137   POTASSIUM 4.0 4.2 3.7 4.1   CHLORIDE 100 102 100 100   CO2 28.0 24.1 27.0 30.0*   ANION GAP 8.0 11.9 8.0 7.0   BUN 9 8 9 10   CREATININE 0.62 0.71 0.68 0.79   EGFR 109.3 104.4 106.9 91.8   GLUCOSE 163* 94 134* 131*   CALCIUM 8.1* 8.3* 8.4* 8.6         Lab 04/17/23  0827 04/16/23  0432 04/14/23  2104   TOTAL PROTEIN 6.3 6.3 7.1   ALBUMIN 2.8* 2.8* 3.1*   GLOBULIN 3.5 3.5  --    ALT (SGPT) 31 29 29   AST (SGOT) 35* 39* 36*   BILIRUBIN 0.8 0.8 1.5*   INDIRECT BILIRUBIN  --   --  1.1   BILIRUBIN DIRECT  --   --  0.4*   ALK PHOS 147* 145* 151*                     Brief Urine Lab Results  (Last result in the past 365 days)      Color   Clarity   Blood   Leuk Est   Nitrite   Protein   CREAT   Urine HCG        04/15/23 1609 Dark Yellow   Cloudy   Trace   Large (3+)   Negative   Trace               Microbiology Results (last 10 days)     Procedure  Component Value - Date/Time    Urine Culture - Urine, Urine, Clean Catch [[id number]]  (Normal) Collected: 04/15/23 1609    Lab Status: Final result Specimen: Urine, Clean Catch Updated: 04/16/23 1754     Urine Culture No growth    Blood Culture - Blood, Arm, Left [[id number]]  (Normal) Collected: 04/14/23 2104    Lab Status: Preliminary result Specimen: Blood from Arm, Left Updated: 04/17/23 2115     Blood Culture No growth at 3 days    Blood Culture - Blood, Arm, Right [[id number]]  (Normal) Collected: 04/14/23 2104    Lab Status: Preliminary result Specimen: Blood from Arm, Right Updated: 04/17/23 2115     Blood Culture No growth at 3 days        Results for orders placed during the hospital encounter of 10/26/21    Duplex venous lower extremity left CAR    Interpretation Summary  · Normal left lower extremity venous duplex scan.    Results for orders placed during the hospital encounter of 10/26/21    Duplex venous lower extremity left CAR    Interpretation Summary  · Normal left lower extremity venous duplex scan.        Time spent on Discharge including face to face service: Greater than 30 minutes      Electronically signed by Manohar Valdivia MD, 04/18/23, 1:00 PM EDT.

## 2023-04-18 NOTE — PLAN OF CARE
Goal Outcome Evaluation:           Progress: no change  Outcome Evaluation: alert and oriented x 4. up ad leon. no complaints at this time.

## 2023-04-18 NOTE — PLAN OF CARE
Goal Outcome Evaluation:  Plan of Care Reviewed With: patient        Progress: no change  Outcome Evaluation: Pt denied pain/discomfort this shift. Pt was able to rest well this shift. Pt may be discharged today. No new issues or needs noted at this time.

## 2023-04-18 NOTE — OUTREACH NOTE
Prep Survey    Flowsheet Row Responses   [facility] facility patient discharged from? [facility]   Is LACE score < 7 ? Yes   Eligibility Not Eligible   What are the reasons patient is not eligible? Other  [Low risk fro readmision]   Does the patient have one of the following disease processes/diagnoses(primary or secondary)? Other   Prep survey completed? Yes          Felicia DAVIS - Registered Nurse

## 2023-04-19 LAB
BACTERIA SPEC AEROBE CULT: NORMAL
BACTERIA SPEC AEROBE CULT: NORMAL
HCV RNA SERPL NAA+PROBE-ACNC: <15 IU/ML
TEST INFORMATION: NORMAL

## 2023-04-20 LAB
HCV GENTYP SERPL NAA+PROBE: NORMAL
HCV RNA SERPL NAA+PROBE-ACNC: 40 IU/ML
HCV RNA SERPL NAA+PROBE-LOG IU: 1.6 LOG10 IU/ML
LABORATORY COMMENT REPORT: NORMAL

## 2023-04-25 DIAGNOSIS — B18.2 CHRONIC HEPATITIS C WITHOUT HEPATIC COMA: ICD-10-CM

## 2023-04-25 DIAGNOSIS — K74.60 CIRRHOSIS OF LIVER WITH ASCITES, UNSPECIFIED HEPATIC CIRRHOSIS TYPE: Primary | ICD-10-CM

## 2023-04-25 DIAGNOSIS — R76.8 HEPATITIS B SURFACE ANTIGEN POSITIVE: ICD-10-CM

## 2023-04-25 DIAGNOSIS — R18.8 CIRRHOSIS OF LIVER WITH ASCITES, UNSPECIFIED HEPATIC CIRRHOSIS TYPE: Primary | ICD-10-CM

## 2023-04-27 ENCOUNTER — TELEPHONE (OUTPATIENT)
Dept: GASTROENTEROLOGY | Facility: CLINIC | Age: 48
End: 2023-04-27
Payer: MEDICAID

## 2023-04-27 NOTE — TELEPHONE ENCOUNTER
----- Message from MARCOS Cespedes sent at 4/25/2023 11:37 PM EDT -----  Rec'd copy of labs after admission, pt has appt here as new pt in July, was seen by Dr Mejía during admission.    Pt needs appt w [facility] -- Sabine Guillaume for Hep C  Also recommend full liver w/u and additional labs for hep b surface Ag +  Request pt do labs as soon as possible

## 2023-04-27 NOTE — TELEPHONE ENCOUNTER
Called pt. No Answer. Left message for pt to call back.    RE: Result Notes/In Basket  Postponing Result Note until tomorrow

## 2023-05-05 ENCOUNTER — TELEPHONE (OUTPATIENT)
Dept: GASTROENTEROLOGY | Facility: CLINIC | Age: 48
End: 2023-05-05
Payer: MEDICAID

## 2023-05-08 ENCOUNTER — TELEPHONE (OUTPATIENT)
Dept: GASTROENTEROLOGY | Facility: CLINIC | Age: 48
End: 2023-05-08
Payer: MEDICAID

## 2023-05-08 NOTE — TELEPHONE ENCOUNTER
Received a call from [facility] in regards to patient's upcoming appointment. Patient is currently inpatient and being discharged today. They requested her appointment be moved up to sometime in the next 1-2 weeks. I advised her we unfortunately don't have anything available in that time frame but if they would like to send over any imaging, labs, notes on the patients admission our APRN could review them and advise if patient needed to be seen sooner and where to schedule her.

## 2023-05-19 ENCOUNTER — OFFICE VISIT (OUTPATIENT)
Dept: GASTROENTEROLOGY | Facility: HOSPITAL | Age: 48
End: 2023-05-19
Payer: MEDICAID

## 2023-05-19 ENCOUNTER — HOSPITAL ENCOUNTER (OUTPATIENT)
Dept: INTERVENTIONAL RADIOLOGY/VASCULAR | Facility: HOSPITAL | Age: 48
Discharge: HOME | End: 2023-05-19
Payer: MEDICAID

## 2023-05-19 VITALS
HEIGHT: 58 IN | WEIGHT: 118 LBS | BODY MASS INDEX: 24.77 KG/M2 | HEART RATE: 111 BPM | DIASTOLIC BLOOD PRESSURE: 106 MMHG | SYSTOLIC BLOOD PRESSURE: 150 MMHG

## 2023-05-19 VITALS — SYSTOLIC BLOOD PRESSURE: 140 MMHG | OXYGEN SATURATION: 100 % | HEART RATE: 95 BPM | DIASTOLIC BLOOD PRESSURE: 77 MMHG

## 2023-05-19 DIAGNOSIS — B18.2 CHRONIC HEPATITIS C WITHOUT HEPATIC COMA: ICD-10-CM

## 2023-05-19 DIAGNOSIS — K74.60 CIRRHOSIS OF LIVER WITH ASCITES, UNSPECIFIED HEPATIC CIRRHOSIS TYPE: ICD-10-CM

## 2023-05-19 DIAGNOSIS — B18.2 CHRONIC HEPATITIS C WITHOUT HEPATIC COMA: Primary | ICD-10-CM

## 2023-05-19 DIAGNOSIS — R18.8 CIRRHOSIS OF LIVER WITH ASCITES, UNSPECIFIED HEPATIC CIRRHOSIS TYPE: ICD-10-CM

## 2023-05-19 DIAGNOSIS — B18.1 HEPATITIS B, CHRONIC: Primary | ICD-10-CM

## 2023-05-19 LAB
APPEARANCE FLD: ABNORMAL
COLOR FLD: ABNORMAL
LYMPHOCYTES NFR FLD MANUAL: 25 %
MACROPHAGE FLUID: 24 %
MESOTHL CELL NFR FLD MANUAL: 2 %
MONOCYTES NFR FLD: 43 %
NEUTROPHILS NFR FLD MANUAL: 6 %
NUC CELL # FLD: 148 /MM3
RBC # FLD AUTO: <2000 /MM3

## 2023-05-19 PROCEDURE — C1729 CATH, DRAINAGE: HCPCS

## 2023-05-19 PROCEDURE — 89051 BODY FLUID CELL COUNT: CPT | Performed by: NURSE PRACTITIONER

## 2023-05-19 PROCEDURE — 76942 ECHO GUIDE FOR BIOPSY: CPT

## 2023-05-19 PROCEDURE — G0463 HOSPITAL OUTPT CLINIC VISIT: HCPCS | Performed by: NURSE PRACTITIONER

## 2023-05-19 PROCEDURE — P9047 ALBUMIN (HUMAN), 25%, 50ML: HCPCS | Performed by: NURSE PRACTITIONER

## 2023-05-19 PROCEDURE — 25010000002 ALBUMIN HUMAN 25% PER 50 ML: Performed by: NURSE PRACTITIONER

## 2023-05-19 RX ORDER — ALBUMIN (HUMAN) 12.5 G/50ML
25 SOLUTION INTRAVENOUS ONCE
Status: COMPLETED | OUTPATIENT
Start: 2023-05-19 | End: 2023-05-19

## 2023-05-19 RX ORDER — LIDOCAINE HYDROCHLORIDE 20 MG/ML
20 INJECTION, SOLUTION INFILTRATION; PERINEURAL ONCE
Status: DISCONTINUED | OUTPATIENT
Start: 2023-05-19 | End: 2023-05-20 | Stop reason: HOSPADM

## 2023-05-19 RX ORDER — ALBUMIN (HUMAN) 12.5 G/50ML
25 SOLUTION INTRAVENOUS ONCE AS NEEDED
OUTPATIENT
Start: 2023-05-19 | End: 2023-05-29

## 2023-05-19 RX ORDER — LIDOCAINE HYDROCHLORIDE 20 MG/ML
20 INJECTION, SOLUTION INFILTRATION; PERINEURAL ONCE
Status: COMPLETED | OUTPATIENT
Start: 2023-05-19 | End: 2023-05-19

## 2023-05-19 RX ADMIN — ALBUMIN HUMAN 25 G: 0.25 SOLUTION INTRAVENOUS at 15:05

## 2023-05-19 RX ADMIN — ALBUMIN HUMAN 25 G: 0.25 SOLUTION INTRAVENOUS at 15:26

## 2023-05-19 RX ADMIN — LIDOCAINE HYDROCHLORIDE 12 ML: 20 INJECTION, SOLUTION INFILTRATION; PERINEURAL at 13:30

## 2023-05-19 RX ADMIN — SODIUM BICARBONATE 1 ML: 84 INJECTION, SOLUTION INTRAVENOUS at 13:30

## 2023-05-19 NOTE — NURSING NOTE
Pt arrived to Rad holding via stretcher s/p Paracentesis. Obtained 6L per tech report. Right lateral ABD CDI. VSS. Notes ABD that has been hurting all day. Informed consent obtained. Drink provided.

## 2023-05-19 NOTE — NURSING NOTE
Albumin complete. VSS. Right lateral ABD site CDI. Escorted ambulatory to main lobby. Driven home in POV by family.

## 2023-05-19 NOTE — PROGRESS NOTES
Chief Complaint      Chief Complaint  Hepatitis C    Subjective            History of Present Illness     Lara Brock presents to [facility] for evaluation and treatment of chronic HCV.      She reports being diagnosed with hepatitis B and C approximately 3 years ago.  Denies previous treatment.  Admits to history of illicit drug use however she has been clean for the last few years.  Denies alcohol abuse.    Reports symptoms of ascites that have been worsening for the past 3 months.  She has been evaluated in the emergency department on a few different occasions.  Paracentesis completed in February, March, April and most recently May 6 with 8 liters removed.  Today she is very uncomfortable due to ascites.  Admits shortness of air.  States that she feels that she needs a paracentesis once weekly.  Reports compliance with lasix and spironolactone.  She is trying to follow a 2 gm Na diet.     Reports compliance with lactulose daily.  States that she has 1-2 loose bowel movements daily.  Admits confusion.      Past Medical History     Allergies   Allergen Reactions   • Codeine Nausea Only       Current Outpatient Medications:   •  furosemide (LASIX) 40 MG tablet, Take 1 tablet by mouth Daily., Disp: , Rfl:   •  gabapentin (NEURONTIN) 300 MG capsule, Take 1 capsule by mouth 3 (Three) Times a Day., Disp: , Rfl:   •  hydrOXYzine pamoate (VISTARIL) 25 MG capsule, Take 1 capsule by mouth 3 (Three) Times a Day As Needed., Disp: , Rfl:   •  lactulose (CHRONULAC) 10 GM/15ML solution, Take 30 mL by mouth Daily., Disp: , Rfl:   •  omeprazole (priLOSEC) 20 MG capsule, Take 1 capsule by mouth Daily., Disp: , Rfl:   •  spironolactone (ALDACTONE) 100 MG tablet, Take 1 tablet by mouth Daily., Disp: , Rfl:   Past Medical History:   Diagnosis Date   • Advanced hepatic cirrhosis    • History of drug use    • Infectious viral hepatitis    • Seizure    • Smoking greater than 30 pack years       Past Surgical History:   Procedure Laterality Date   • CHOLECYSTECTOMY     • TUBAL ABDOMINAL LIGATION       Social History     Socioeconomic History   • Marital status: Single   Tobacco Use   • Smoking status: Every Day     Packs/day: 0.50     Years: 15.00     Pack years: 7.50     Types: Cigarettes   • Smokeless tobacco: Never   Vaping Use   • Vaping Use: Never used   Substance and Sexual Activity   • Alcohol use: Never   • Drug use: Yes     Types: Marijuana   • Sexual activity: Not Currently       Objective     Objective     Vitals:    05/19/23 1038   BP: (!) 150/106   Pulse: 111     Body mass index is 24.66 kg/m².  Body surface area is 1.46 meters squared.    Physical Exam    Results       Result Review :     The following data was reviewed by: MARCOS Anton on 05/19/2023:    CMP:  Lab Results   Component Value Date    BUN 9 04/17/2023    CREATININE 0.62 04/17/2023     04/17/2023    K 4.0 04/17/2023     04/17/2023    CALCIUM 8.1 (L) 04/17/2023    ALBUMIN 2.8 (L) 04/17/2023    BILITOT 0.8 04/17/2023    ALKPHOS 147 (H) 04/17/2023    AST 35 (H) 04/17/2023    ALT 31 04/17/2023     CBC w/ diff:   Lab Results   Component Value Date    WBC 6.37 04/17/2023    RBC 4.27 04/17/2023    HGB 13.1 04/17/2023    HCT 38.8 04/17/2023    MCV 90.9 04/17/2023    MCH 30.7 04/17/2023    MCHC 33.8 04/17/2023    RDW 15.4 04/17/2023    PLT 68 (L) 04/17/2023    NEUTRORELPCT 67.3 04/17/2023    AUTOIGPER 0.2 04/17/2023    LYMPHORELPCT 21.8 04/17/2023    MONORELPCT 8.8 04/17/2023    EOSRELPCT 1.4 04/17/2023    BASORELPCT 0.5 04/17/2023     Lab Results   Component Value Date    PROTIME 17.2 (H) 02/13/2023    INR 1.38 (H) 02/13/2023     Lab Results   Component Value Date    HEPATITISC <15 04/18/2023    KMASPJ97 1.602 04/18/2023    HCVINFO Comment 04/18/2023 4/11/2023 hepatitis B surface antigen-reactive.    5/8/2023 CBC:WBC 5.7, hemoglobin 12.2, hematocrit 37.8, platelets 76.  Ammonia-74.  BMP: Creatinine 0.7,  sodium 135.    5/6/2023 paracentesis with 8 L removed.  No signs of SBP.  CMP: Creatinine 0.7, alk phos 203, AST 49, ALT 45, total bilirubin 1, sodium 137.  INR-1.38.        Assessment and Plan             Assessment:    Diagnoses and all orders for this visit:    1. Hepatitis B, chronic (Primary)  -     Ambulatory Referral to Transplant Surgery    2. Chronic hepatitis C without hepatic coma  -     Ambulatory Referral to Transplant Surgery    3. Cirrhosis of liver with ascites, unspecified hepatic cirrhosis type  -     US Paracentesis; Standing  -     Body Fluid Cell Count With Differential - Body Fluid, Peritoneum; Standing  -     albumin human 25 % IV SOLN 25 g  -     Ambulatory Referral to Transplant Surgery       Discussed with patient and family that she needs evaluation at a liver transplant center given decompensated cirrhosis and co-infection with Hep. B.  MELD 10, Child Boss B    Plan:     Patient scheduled for paracentesis today.  Order placed for weekly paracentesis.  Referral placed to transplant center.  Order placed for weekly paracentesis.        Patient Instructions: Avoid Alcohol, Avoid Illicit Drug Use, Importance of keeping appointments.  Patient Education Provided: Yes    Follow Up     Follow Up   Return if symptoms worsen or fail to improve.  Patient was given instructions and counseling regarding her condition or for health maintenance advice. Please see specific information pulled into the AVS if appropriate.     Piedad Guillaume, APRN  05/19/2023

## 2023-05-25 ENCOUNTER — TELEPHONE (OUTPATIENT)
Dept: GASTROENTEROLOGY | Facility: HOSPITAL | Age: 48
End: 2023-05-25
Payer: MEDICAID

## 2023-05-25 ENCOUNTER — TELEPHONE (OUTPATIENT)
Dept: GASTROENTEROLOGY | Facility: CLINIC | Age: 48
End: 2023-05-25
Payer: MEDICAID

## 2023-05-25 NOTE — TELEPHONE ENCOUNTER
Patient left a voicemail to reschedule missed paracentisis from yesterday 5/24/23 due to a death in the family. I attempted to reschedule this for her and the radiology department was already gone for the day, I attempted to call the patient back and had to leave a message.

## 2023-06-06 ENCOUNTER — APPOINTMENT (OUTPATIENT)
Dept: INTERVENTIONAL RADIOLOGY/VASCULAR | Facility: HOSPITAL | Age: 48
End: 2023-06-06
Payer: MEDICAID

## 2023-06-06 ENCOUNTER — HOSPITAL ENCOUNTER (OUTPATIENT)
Facility: HOSPITAL | Age: 48
Setting detail: OBSERVATION
LOS: 1 days | Discharge: HOME | End: 2023-06-14
Attending: STUDENT IN AN ORGANIZED HEALTH CARE EDUCATION/TRAINING PROGRAM | Admitting: INTERNAL MEDICINE
Payer: MEDICAID

## 2023-06-06 PROBLEM — R18.8 ASCITES: Status: ACTIVE | Noted: 2023-06-06

## 2023-06-06 PROBLEM — E44.0 MODERATE MALNUTRITION: Status: ACTIVE | Noted: 2023-06-06

## 2023-06-06 LAB
ALBUMIN SERPL-MCNC: 3.6 G/DL (ref 3.5–5.2)
ALBUMIN/GLOB SERPL: 0.9 G/DL
ALP SERPL-CCNC: 226 U/L (ref 39–117)
ALT SERPL W P-5'-P-CCNC: 168 U/L (ref 1–33)
AMMONIA BLD-SCNC: 62 UMOL/L (ref 11–51)
ANION GAP SERPL CALCULATED.3IONS-SCNC: 9.7 MMOL/L (ref 5–15)
APTT PPP: 29 SECONDS (ref 24.2–34.2)
AST SERPL-CCNC: 157 U/L (ref 1–32)
BASOPHILS # BLD AUTO: 0.01 10*3/MM3 (ref 0–0.2)
BASOPHILS NFR BLD AUTO: 0.2 % (ref 0–1.5)
BILIRUB SERPL-MCNC: 0.9 MG/DL (ref 0–1.2)
BUN SERPL-MCNC: 10 MG/DL (ref 6–20)
BUN/CREAT SERPL: 11.4 (ref 7–25)
CALCIUM SPEC-SCNC: 8.7 MG/DL (ref 8.6–10.5)
CHLORIDE SERPL-SCNC: 102 MMOL/L (ref 98–107)
CO2 SERPL-SCNC: 28.3 MMOL/L (ref 22–29)
CREAT SERPL-MCNC: 0.88 MG/DL (ref 0.57–1)
CRP SERPL-MCNC: 0.32 MG/DL (ref 0–0.5)
D-LACTATE SERPL-SCNC: 1.9 MMOL/L (ref 0.5–2)
D-LACTATE SERPL-SCNC: 2.2 MMOL/L (ref 0.5–2)
D-LACTATE SERPL-SCNC: 2.6 MMOL/L (ref 0.5–2)
DEPRECATED RDW RBC AUTO: 47.9 FL (ref 37–54)
EGFRCR SERPLBLD CKD-EPI 2021: 80.7 ML/MIN/1.73
EOSINOPHIL # BLD AUTO: 0.05 10*3/MM3 (ref 0–0.4)
EOSINOPHIL NFR BLD AUTO: 1 % (ref 0.3–6.2)
ERYTHROCYTE [DISTWIDTH] IN BLOOD BY AUTOMATED COUNT: 14.2 % (ref 12.3–15.4)
ERYTHROCYTE [SEDIMENTATION RATE] IN BLOOD: 4 MM/HR (ref 0–20)
GLOBULIN UR ELPH-MCNC: 4 GM/DL
GLUCOSE SERPL-MCNC: 78 MG/DL (ref 65–99)
HCT VFR BLD AUTO: 46.3 % (ref 34–46.6)
HGB BLD-MCNC: 15.7 G/DL (ref 12–15.9)
IMM GRANULOCYTES # BLD AUTO: 0.01 10*3/MM3 (ref 0–0.05)
IMM GRANULOCYTES NFR BLD AUTO: 0.2 % (ref 0–0.5)
INR PPP: 1.33 (ref 0.86–1.15)
LYMPHOCYTES # BLD AUTO: 1.04 10*3/MM3 (ref 0.7–3.1)
LYMPHOCYTES NFR BLD AUTO: 20.7 % (ref 19.6–45.3)
MAGNESIUM SERPL-MCNC: 2 MG/DL (ref 1.6–2.6)
MCH RBC QN AUTO: 31.2 PG (ref 26.6–33)
MCHC RBC AUTO-ENTMCNC: 33.9 G/DL (ref 31.5–35.7)
MCV RBC AUTO: 91.9 FL (ref 79–97)
MONOCYTES # BLD AUTO: 0.36 10*3/MM3 (ref 0.1–0.9)
MONOCYTES NFR BLD AUTO: 7.2 % (ref 5–12)
NEUTROPHILS NFR BLD AUTO: 3.56 10*3/MM3 (ref 1.7–7)
NEUTROPHILS NFR BLD AUTO: 70.7 % (ref 42.7–76)
NRBC BLD AUTO-RTO: 0 /100 WBC (ref 0–0.2)
PLATELET # BLD AUTO: 66 10*3/MM3 (ref 140–450)
PMV BLD AUTO: 10.9 FL (ref 6–12)
POTASSIUM SERPL-SCNC: 3.4 MMOL/L (ref 3.5–5.2)
POTASSIUM SERPL-SCNC: 4.5 MMOL/L (ref 3.5–5.2)
PROCALCITONIN SERPL-MCNC: 0.13 NG/ML (ref 0–0.25)
PROT SERPL-MCNC: 7.6 G/DL (ref 6–8.5)
PROTHROMBIN TIME: 16.5 SECONDS (ref 11.8–14.9)
RBC # BLD AUTO: 5.04 10*6/MM3 (ref 3.77–5.28)
SODIUM SERPL-SCNC: 140 MMOL/L (ref 136–145)
WBC NRBC COR # BLD: 5.03 10*3/MM3 (ref 3.4–10.8)

## 2023-06-06 PROCEDURE — 99223 1ST HOSP IP/OBS HIGH 75: CPT | Performed by: INTERNAL MEDICINE

## 2023-06-06 PROCEDURE — 84132 ASSAY OF SERUM POTASSIUM: CPT | Performed by: INTERNAL MEDICINE

## 2023-06-06 PROCEDURE — 82140 ASSAY OF AMMONIA: CPT | Performed by: INTERNAL MEDICINE

## 2023-06-06 PROCEDURE — 85610 PROTHROMBIN TIME: CPT | Performed by: INTERNAL MEDICINE

## 2023-06-06 PROCEDURE — 87040 BLOOD CULTURE FOR BACTERIA: CPT | Performed by: INTERNAL MEDICINE

## 2023-06-06 PROCEDURE — 85730 THROMBOPLASTIN TIME PARTIAL: CPT | Performed by: INTERNAL MEDICINE

## 2023-06-06 PROCEDURE — 85025 COMPLETE CBC W/AUTO DIFF WBC: CPT | Performed by: INTERNAL MEDICINE

## 2023-06-06 PROCEDURE — G0379 DIRECT REFER HOSPITAL OBSERV: HCPCS

## 2023-06-06 PROCEDURE — 80053 COMPREHEN METABOLIC PANEL: CPT | Performed by: INTERNAL MEDICINE

## 2023-06-06 PROCEDURE — 84145 PROCALCITONIN (PCT): CPT | Performed by: INTERNAL MEDICINE

## 2023-06-06 PROCEDURE — 0 CEFTRIAXONE PER 250 MG: Performed by: INTERNAL MEDICINE

## 2023-06-06 PROCEDURE — 86140 C-REACTIVE PROTEIN: CPT | Performed by: INTERNAL MEDICINE

## 2023-06-06 PROCEDURE — G0378 HOSPITAL OBSERVATION PER HR: HCPCS

## 2023-06-06 PROCEDURE — 76705 ECHO EXAM OF ABDOMEN: CPT

## 2023-06-06 PROCEDURE — 96376 TX/PRO/DX INJ SAME DRUG ADON: CPT

## 2023-06-06 PROCEDURE — 83605 ASSAY OF LACTIC ACID: CPT | Performed by: INTERNAL MEDICINE

## 2023-06-06 PROCEDURE — 83735 ASSAY OF MAGNESIUM: CPT | Performed by: INTERNAL MEDICINE

## 2023-06-06 PROCEDURE — 85652 RBC SED RATE AUTOMATED: CPT | Performed by: INTERNAL MEDICINE

## 2023-06-06 PROCEDURE — 96375 TX/PRO/DX INJ NEW DRUG ADDON: CPT

## 2023-06-06 PROCEDURE — 25010000002 MORPHINE PER 10 MG: Performed by: INTERNAL MEDICINE

## 2023-06-06 RX ORDER — SODIUM CHLORIDE 0.9 % (FLUSH) 0.9 %
10 SYRINGE (ML) INJECTION AS NEEDED
Status: DISCONTINUED | OUTPATIENT
Start: 2023-06-06 | End: 2023-06-14 | Stop reason: HOSPADM

## 2023-06-06 RX ORDER — BISACODYL 10 MG
10 SUPPOSITORY, RECTAL RECTAL DAILY PRN
Status: DISCONTINUED | OUTPATIENT
Start: 2023-06-06 | End: 2023-06-13

## 2023-06-06 RX ORDER — FUROSEMIDE 40 MG/1
40 TABLET ORAL DAILY
Status: DISCONTINUED | OUTPATIENT
Start: 2023-06-06 | End: 2023-06-09

## 2023-06-06 RX ORDER — CEFTRIAXONE SODIUM 2 G/50ML
2 INJECTION, SOLUTION INTRAVENOUS EVERY 24 HOURS
Status: COMPLETED | OUTPATIENT
Start: 2023-06-06 | End: 2023-06-09

## 2023-06-06 RX ORDER — BISACODYL 5 MG/1
5 TABLET, DELAYED RELEASE ORAL DAILY PRN
Status: DISCONTINUED | OUTPATIENT
Start: 2023-06-06 | End: 2023-06-13

## 2023-06-06 RX ORDER — POTASSIUM CHLORIDE 20 MEQ/1
40 TABLET, EXTENDED RELEASE ORAL EVERY 4 HOURS
Status: COMPLETED | OUTPATIENT
Start: 2023-06-06 | End: 2023-06-06

## 2023-06-06 RX ORDER — NITROGLYCERIN 0.4 MG/1
0.4 TABLET SUBLINGUAL
Status: DISCONTINUED | OUTPATIENT
Start: 2023-06-06 | End: 2023-06-14 | Stop reason: HOSPADM

## 2023-06-06 RX ORDER — ALBUMIN (HUMAN) 12.5 G/50ML
12.5 SOLUTION INTRAVENOUS ONCE
Status: DISCONTINUED | OUTPATIENT
Start: 2023-06-06 | End: 2023-06-06

## 2023-06-06 RX ORDER — LIDOCAINE HYDROCHLORIDE 20 MG/ML
20 INJECTION, SOLUTION INFILTRATION; PERINEURAL ONCE
Status: DISCONTINUED | OUTPATIENT
Start: 2023-06-06 | End: 2023-06-06

## 2023-06-06 RX ORDER — SODIUM CHLORIDE 0.9 % (FLUSH) 0.9 %
10 SYRINGE (ML) INJECTION EVERY 12 HOURS SCHEDULED
Status: DISCONTINUED | OUTPATIENT
Start: 2023-06-06 | End: 2023-06-14 | Stop reason: HOSPADM

## 2023-06-06 RX ORDER — POLYETHYLENE GLYCOL 3350 17 G/17G
17 POWDER, FOR SOLUTION ORAL DAILY PRN
Status: DISCONTINUED | OUTPATIENT
Start: 2023-06-06 | End: 2023-06-13

## 2023-06-06 RX ORDER — HYDRALAZINE HYDROCHLORIDE 20 MG/ML
10 INJECTION INTRAMUSCULAR; INTRAVENOUS EVERY 6 HOURS PRN
Status: DISCONTINUED | OUTPATIENT
Start: 2023-06-06 | End: 2023-06-14 | Stop reason: HOSPADM

## 2023-06-06 RX ORDER — MORPHINE SULFATE 2 MG/ML
2 INJECTION, SOLUTION INTRAMUSCULAR; INTRAVENOUS EVERY 4 HOURS PRN
Status: DISCONTINUED | OUTPATIENT
Start: 2023-06-06 | End: 2023-06-11

## 2023-06-06 RX ORDER — LACTULOSE 10 G/15ML
20 SOLUTION ORAL DAILY
Status: DISCONTINUED | OUTPATIENT
Start: 2023-06-06 | End: 2023-06-07

## 2023-06-06 RX ORDER — TRAMADOL HYDROCHLORIDE 50 MG/1
50 TABLET ORAL EVERY 6 HOURS PRN
Status: DISCONTINUED | OUTPATIENT
Start: 2023-06-06 | End: 2023-06-08

## 2023-06-06 RX ORDER — SPIRONOLACTONE 25 MG/1
100 TABLET ORAL DAILY
Status: DISCONTINUED | OUTPATIENT
Start: 2023-06-06 | End: 2023-06-09

## 2023-06-06 RX ORDER — AMOXICILLIN 250 MG
2 CAPSULE ORAL 2 TIMES DAILY
Status: DISCONTINUED | OUTPATIENT
Start: 2023-06-06 | End: 2023-06-13

## 2023-06-06 RX ORDER — PANTOPRAZOLE SODIUM 40 MG/1
40 TABLET, DELAYED RELEASE ORAL
Status: DISCONTINUED | OUTPATIENT
Start: 2023-06-06 | End: 2023-06-14 | Stop reason: HOSPADM

## 2023-06-06 RX ORDER — GABAPENTIN 300 MG/1
300 CAPSULE ORAL 3 TIMES DAILY
Status: DISCONTINUED | OUTPATIENT
Start: 2023-06-06 | End: 2023-06-14 | Stop reason: HOSPADM

## 2023-06-06 RX ORDER — SODIUM CHLORIDE 9 MG/ML
40 INJECTION, SOLUTION INTRAVENOUS AS NEEDED
Status: DISCONTINUED | OUTPATIENT
Start: 2023-06-06 | End: 2023-06-14 | Stop reason: HOSPADM

## 2023-06-06 RX ORDER — HYDROXYZINE PAMOATE 25 MG/1
25 CAPSULE ORAL 3 TIMES DAILY PRN
Status: DISCONTINUED | OUTPATIENT
Start: 2023-06-06 | End: 2023-06-14 | Stop reason: HOSPADM

## 2023-06-06 RX ORDER — ONDANSETRON 2 MG/ML
4 INJECTION INTRAMUSCULAR; INTRAVENOUS EVERY 6 HOURS PRN
Status: DISCONTINUED | OUTPATIENT
Start: 2023-06-06 | End: 2023-06-14 | Stop reason: HOSPADM

## 2023-06-06 RX ADMIN — MORPHINE SULFATE 4 MG: 4 INJECTION, SOLUTION INTRAMUSCULAR; INTRAVENOUS at 17:53

## 2023-06-06 RX ADMIN — CEFTRIAXONE SODIUM 2 G: 2 INJECTION, SOLUTION INTRAVENOUS at 17:00

## 2023-06-06 RX ADMIN — Medication 10 ML: at 09:05

## 2023-06-06 RX ADMIN — LACTULOSE 20 G: 20 SOLUTION ORAL at 08:42

## 2023-06-06 RX ADMIN — POTASSIUM CHLORIDE 40 MEQ: 1500 TABLET, EXTENDED RELEASE ORAL at 08:43

## 2023-06-06 RX ADMIN — METOPROLOL TARTRATE 2.5 MG: 1 INJECTION, SOLUTION INTRAVENOUS at 15:52

## 2023-06-06 RX ADMIN — TRAMADOL HYDROCHLORIDE 50 MG: 50 TABLET, COATED ORAL at 20:03

## 2023-06-06 RX ADMIN — GABAPENTIN 300 MG: 300 CAPSULE ORAL at 08:43

## 2023-06-06 RX ADMIN — FUROSEMIDE 40 MG: 40 TABLET ORAL at 08:43

## 2023-06-06 RX ADMIN — Medication 10 ML: at 20:03

## 2023-06-06 RX ADMIN — MORPHINE SULFATE 4 MG: 4 INJECTION, SOLUTION INTRAMUSCULAR; INTRAVENOUS at 22:16

## 2023-06-06 RX ADMIN — GABAPENTIN 300 MG: 300 CAPSULE ORAL at 17:00

## 2023-06-06 RX ADMIN — POTASSIUM CHLORIDE 40 MEQ: 1500 TABLET, EXTENDED RELEASE ORAL at 05:09

## 2023-06-06 RX ADMIN — GABAPENTIN 300 MG: 300 CAPSULE ORAL at 20:03

## 2023-06-06 RX ADMIN — SPIRONOLACTONE 100 MG: 25 TABLET ORAL at 08:43

## 2023-06-06 RX ADMIN — MORPHINE SULFATE 4 MG: 4 INJECTION, SOLUTION INTRAMUSCULAR; INTRAVENOUS at 09:58

## 2023-06-06 RX ADMIN — SENNOSIDES AND DOCUSATE SODIUM 2 TABLET: 8.6; 5 TABLET ORAL at 20:03

## 2023-06-06 RX ADMIN — MORPHINE SULFATE 2 MG: 2 INJECTION, SOLUTION INTRAMUSCULAR; INTRAVENOUS at 14:52

## 2023-06-06 RX ADMIN — MORPHINE SULFATE 4 MG: 4 INJECTION, SOLUTION INTRAMUSCULAR; INTRAVENOUS at 03:43

## 2023-06-06 RX ADMIN — SENNOSIDES AND DOCUSATE SODIUM 2 TABLET: 8.6; 5 TABLET ORAL at 08:43

## 2023-06-06 RX ADMIN — PANTOPRAZOLE SODIUM 40 MG: 40 TABLET, DELAYED RELEASE ORAL at 05:09

## 2023-06-06 NOTE — PLAN OF CARE
Problem: Adult Inpatient Plan of Care  Goal: Plan of Care Review  Outcome: Ongoing, Progressing  Goal: Patient-Specific Goal (Individualized)  Outcome: Ongoing, Progressing  Goal: Absence of Hospital-Acquired Illness or Injury  Outcome: Ongoing, Progressing  Intervention: Identify and Manage Fall Risk  Intervention: Prevent and Manage VTE (Venous Thromboembolism) Risk  Goal: Optimal Comfort and Wellbeing  Outcome: Ongoing, Progressing  Intervention: Monitor Pain and Promote Comfort  Intervention: Provide Person-Centered Care  Goal: Readiness for Transition of Care  Outcome: Ongoing, Progressing     Problem: Pain Acute  Goal: Acceptable Pain Control and Functional Ability  Outcome: Ongoing, Progressing  Intervention: Develop Pain Management Plan     Problem: Skin Injury Risk Increased  Goal: Skin Health and Integrity  Outcome: Ongoing, Progressing   Goal Outcome Evaluation:   Patient alert & oriented x 4, VSS, medicated for pain per MAR, no significant changes in status during shift, continuing plan of care.

## 2023-06-06 NOTE — CONSULTS
"Nutrition Services    Patient Name: Lara Brock  YOB: 1975  MRN: [id number]  Admission date: 6/6/2023      CLINICAL NUTRITION ASSESSMENT      Reason for Assessment  Identified at risk by screening criteria, MST score 2+   H&P:    Past Medical History:   Diagnosis Date    Advanced hepatic cirrhosis     History of drug use     Infectious viral hepatitis     Seizure     Smoking greater than 30 pack years         Current Problems:   Active Hospital Problems    Diagnosis     **Abdominal pain         Nutrition/Diet History         Narrative     RD consult triggered for MST score 3 for unsure weight loss and decreased appetite. Pt presented to ED with abdominal pain x 4 days, reports having paracentesis 6/01/23. PMH significant for advanced hepatic cirrhosis.     Pt on regular diet, consumed 100% breakfast today. Over the past 2 months, patient with -7.9% in weight, clinically significant. Of note, with advanced cirrhosis, wt change might be d/t fluids accumulation and removal.     RD visited patient after breakfast. Pt reports she used to weigh ~140 lbs over unspecified period of time. Reports she has an overall decreased appetite. RD notes, pt is missing teeth. Denies any difficulty chewing or swallowing.     NFPE performed, see full report below. Patient declines ONS but is agreeable to cottage cheese and peaches as a snack. RD to order and CTM per protocol.      Anthropometrics        Current Height, Weight Height: 147.3 cm (58\")  Weight: 53.1 kg (117 lb 1 oz)   Current BMI Body mass index is 24.47 kg/m².       Weight Hx  Wt Readings from Last 30 Encounters:   06/06/23 0303 53.1 kg (117 lb 1 oz)   05/19/23 1038 53.5 kg (118 lb)   04/14/23 1937 57.7 kg (127 lb 3.3 oz)            Wt Change Observation -7.9% x 2 months, clinically significant    Chart review reveals paracentesis June 1, decrease could be d/t fluids.     Estimated/Assessed Needs       Energy Requirements 25-30 kcal/kg   EST Needs " (kcal/day) 5573-7818 kcal       Protein Requirements 0.8-1.0 g/kg   EST Daily Needs (g/day) 42-53 g       Fluid Requirements 1 ml/kcal    Estimated Needs (mL/day) 2484-6136 ml     Labs/Medications         Pertinent Labs Reviewed.   Results from last 7 days   Lab Units 06/06/23  0339   SODIUM mmol/L 140   POTASSIUM mmol/L 3.4*   CHLORIDE mmol/L 102   CO2 mmol/L 28.3   BUN mg/dL 10   CREATININE mg/dL 0.88   CALCIUM mg/dL 8.7   BILIRUBIN mg/dL 0.9   ALK PHOS U/L 226*   ALT (SGPT) U/L 168*   AST (SGOT) U/L 157*   GLUCOSE mg/dL 78     Results from last 7 days   Lab Units 06/06/23  0339   MAGNESIUM mg/dL 2.0   HEMOGLOBIN g/dL 15.7   HEMATOCRIT % 46.3     No results found for: COVID19  No results found for: HGBA1C      Pertinent Medications Reviewed.     Current Nutrition Orders & Evaluation of Intake       Oral Nutrition     Current PO Diet Diet: Regular/House Diet; Texture: Regular Texture (IDDSI 7); Fluid Consistency: Thin (IDDSI 0)   Supplement No active supplement orders       Malnutrition Severity Assessment           Malnutrition Severity Assessment      Patient meets criteria for : Moderate (non-severe) Malnutrition  Malnutrition Type (last 8 hours)       Malnutrition Severity Assessment       Row Name 06/06/23 0955       Malnutrition Severity Assessment    Malnutrition Type Chronic Disease - Related Malnutrition      Row Name 06/06/23 0955       Muscle Loss    Loss of Muscle Mass Findings Moderate    [place] Region Severe - deep hollowing/scooping, lack of muscle to touch, facial bones well defined    Clavicle Bone Region Moderate - some protrusion in females, visible in males    Acromion Bone Region Moderate - acromion may slightly protrude    Scapular Bone Region Moderate - mild depression, bones may show slightly      Row Name 06/06/23 0955       Fat Loss    Subcutaneous Fat Loss Findings Moderate    Orbital Region  Moderate -  somewhat hollowness, slightly dark circles      Row Name 06/06/23 0955       Criteria  Met (Must meet criteria for severity in at least 2 of these categories: M Wasting, Fat Loss, Fluid, Secondary Signs, Wt. Status, Intake)    Patient meets criteria for  Moderate (non-severe) Malnutrition                          Nutrition Diagnosis         Nutrition Dx Problem 1 Moderate malnutrition related to decreased ability to consume sufficient energy as evidenced by decreased appetite., unintended wt change., body composition changes., and patient report.     Nutrition Intervention         Order cottage cheese and peaches as snack.     Medical Nutrition Therapy/Nutrition Education          Learner     Readiness Patient  Acceptance     Method     Response Explanation  Verbalizes understanding     Monitor/Evaluation        Monitor Per protocol, PO intake, Weight, POC/GOC     Nutrition Discharge Plan         To be determined     Electronically signed by:  Piedad Whitfield RD  06/06/23 08:23 EDT

## 2023-06-06 NOTE — H&P
[facility] HISTORY AND PHYSICAL  Date: 2023   Patient Name: Lara Brock  : 1975  MRN: [id number]  Primary Care Physician:  Soy Garcia MD  Date of admission: 2023    Subjective   Subjective     Chief Complaint: Abdominal pain    HPI:    Lara Brock is a 48 y.o. female past medical history of cirrhosis that presented to outside ER for evaluation of abdominal pain x4 days.  Patient had paracentesis done at [facility] on  unclear the results of that procedure however states that since that procedure she had persistent abdominal pain mostly localized around the procedure site.  She has also been having drainage from the procedure site as well.  She denies any fevers, chills, sweats, chest pain or shortness of breath, palpitations, diarrhea constipation, dysuria, weakness, rash.  She describes abdominal pain as sharp, radiates diffusely, constant, no known aggravating or alleviating factors.  She has associated nausea and vomiting.  She presented back to [facility] prior to transfer and there was concern for SBP due to her severe abdominal pain and guarding on exam.  She received Rocephin and was transferred for further care.  Lab work was essentially unremarkable.      Personal History     Past Medical History:  Past Medical History:   Diagnosis Date    Advanced hepatic cirrhosis     History of drug use     Infectious viral hepatitis     Seizure     Smoking greater than 30 pack years          Past Surgical History:  Past Surgical History:   Procedure Laterality Date    CHOLECYSTECTOMY      TUBAL ABDOMINAL LIGATION           Family History:   History reviewed. No pertinent family history.      Social History:   Social History     Tobacco Use    Smoking status: Every Day     Packs/day: 0.25     Years: 15.00     Pack years: 3.75     Types: Cigarettes    Smokeless tobacco: Never   Vaping Use    Vaping Use: Never used   Substance Use Topics    Alcohol use:  Never    Drug use: Yes     Types: Marijuana         Home Medications:  furosemide, gabapentin, hydrOXYzine pamoate, lactulose, omeprazole, and spironolactone    Allergies:  Allergies   Allergen Reactions    Codeine Nausea Only       Review of Systems   All systems were reviewed and negative except for: Abdominal pain, nausea and vomiting    Objective   Objective     Vitals:        Physical Exam    Constitutional: Awake, alert, no acute distress   Eyes: Pupils equal, sclerae anicteric, no conjunctival injection   HENT: NCAT, mucous membranes moist   Neck: Supple, no thyromegaly, no lymphadenopathy, trachea midline   Respiratory: Clear to auscultation bilaterally, nonlabored respirations    Cardiovascular: RRR, no murmurs, rubs, or gallops, palpable pedal pulses bilaterally   Gastrointestinal: Positive bowel sounds, diffusely tender to light palpation   Musculoskeletal: No bilateral ankle edema, no clubbing or cyanosis to extremities   Psychiatric: Appropriate affect, cooperative   Neurologic: Oriented x 3, strength symmetric in all extremities, Cranial Nerves grossly intact to confrontation, speech clear   Skin: No rashes     Result Review    Result Review:  I have personally reviewed the results from the time of this admission to 6/6/2023 03:07 EDT and agree with these findings:  [x]  Laboratory  [x]  Microbiology  [x]  Radiology  []  EKG/Telemetry   []  Cardiology/Vascular   []  Pathology  []  Old records  []  Other:      Assessment & Plan   Assessment / Plan     Assessment/Plan:   Abdominal pain: Concern for SBP.  We will plan for diagnostic paracentesis.  Continue with Rocephin started prior to transfer.  Supportive care.  Check blood cultures lactic acid, ESR, CRP.  Serial abdominal exams and serial labs.  Cirrhosis: Continue home regimen including Lasix and spironolactone along with lactulose.  Check ammonia level, INR.        DVT prophylaxis:  Mechanical DVT prophylaxis orders are present.    CODE STATUS:     Medical Intervention Limits: NO intubation (DNI)  Code Status (Patient has no pulse and is not breathing): No CPR (Do Not Attempt to Resuscitate)  Medical Interventions (Patient has pulse or is breathing): Limited Support      Admission Status:  I believe this patient meets observation status.    Electronically signed by Dennys Herrera Jr, MD, 06/06/23, 3:07 AM EDT.

## 2023-06-06 NOTE — PROGRESS NOTES
This is a 48-year-old female with a past medical history of liver cirrhosis, substance abuse, GERD, hepatitis B, hepatitis C, smoker and seizures presenting with abdominal pain. Pt states she had a paracentesis on June 1st and presented to [facility] with abdominal pain and leaking at the procedure site. Pt is being transferred to our hospital for possible SBP.

## 2023-06-06 NOTE — PLAN OF CARE
Goal Outcome Evaluation:      Patient was an direct admit on shift. Patient is A&Ox4. VSS. Medicated once for pain, see MAR. No complaints from patient. Will continue to monitor.

## 2023-06-07 LAB
ALBUMIN SERPL-MCNC: 3 G/DL (ref 3.5–5.2)
ALBUMIN/GLOB SERPL: 0.9 G/DL
ALP SERPL-CCNC: 250 U/L (ref 39–117)
ALT SERPL W P-5'-P-CCNC: 192 U/L (ref 1–33)
ANION GAP SERPL CALCULATED.3IONS-SCNC: 6.8 MMOL/L (ref 5–15)
AST SERPL-CCNC: 167 U/L (ref 1–32)
BILIRUB SERPL-MCNC: 0.7 MG/DL (ref 0–1.2)
BUN SERPL-MCNC: 8 MG/DL (ref 6–20)
BUN/CREAT SERPL: 10.7 (ref 7–25)
CALCIUM SPEC-SCNC: 8.2 MG/DL (ref 8.6–10.5)
CHLORIDE SERPL-SCNC: 99 MMOL/L (ref 98–107)
CO2 SERPL-SCNC: 30.2 MMOL/L (ref 22–29)
CREAT SERPL-MCNC: 0.75 MG/DL (ref 0.57–1)
DEPRECATED RDW RBC AUTO: 47.8 FL (ref 37–54)
EGFRCR SERPLBLD CKD-EPI 2021: 97.7 ML/MIN/1.73
ERYTHROCYTE [DISTWIDTH] IN BLOOD BY AUTOMATED COUNT: 14.3 % (ref 12.3–15.4)
GLOBULIN UR ELPH-MCNC: 3.2 GM/DL
GLUCOSE SERPL-MCNC: 147 MG/DL (ref 65–99)
HCT VFR BLD AUTO: 38.9 % (ref 34–46.6)
HGB BLD-MCNC: 13.2 G/DL (ref 12–15.9)
MCH RBC QN AUTO: 30.9 PG (ref 26.6–33)
MCHC RBC AUTO-ENTMCNC: 33.9 G/DL (ref 31.5–35.7)
MCV RBC AUTO: 91.1 FL (ref 79–97)
PLATELET # BLD AUTO: 60 10*3/MM3 (ref 140–450)
PMV BLD AUTO: 11.1 FL (ref 6–12)
POTASSIUM SERPL-SCNC: 4.2 MMOL/L (ref 3.5–5.2)
PROT SERPL-MCNC: 6.2 G/DL (ref 6–8.5)
RBC # BLD AUTO: 4.27 10*6/MM3 (ref 3.77–5.28)
SODIUM SERPL-SCNC: 136 MMOL/L (ref 136–145)
WBC NRBC COR # BLD: 4.01 10*3/MM3 (ref 3.4–10.8)

## 2023-06-07 PROCEDURE — 25010000002 MORPHINE PER 10 MG: Performed by: INTERNAL MEDICINE

## 2023-06-07 PROCEDURE — 99233 SBSQ HOSP IP/OBS HIGH 50: CPT | Performed by: FAMILY MEDICINE

## 2023-06-07 PROCEDURE — 0 CEFTRIAXONE PER 250 MG: Performed by: INTERNAL MEDICINE

## 2023-06-07 PROCEDURE — 96376 TX/PRO/DX INJ SAME DRUG ADON: CPT

## 2023-06-07 PROCEDURE — 80053 COMPREHEN METABOLIC PANEL: CPT | Performed by: FAMILY MEDICINE

## 2023-06-07 PROCEDURE — 85027 COMPLETE CBC AUTOMATED: CPT | Performed by: FAMILY MEDICINE

## 2023-06-07 PROCEDURE — G0378 HOSPITAL OBSERVATION PER HR: HCPCS

## 2023-06-07 PROCEDURE — 25010000002 ONDANSETRON PER 1 MG: Performed by: INTERNAL MEDICINE

## 2023-06-07 RX ORDER — LACTULOSE 10 G/15ML
30 SOLUTION ORAL
Status: DISCONTINUED | OUTPATIENT
Start: 2023-06-07 | End: 2023-06-14 | Stop reason: HOSPADM

## 2023-06-07 RX ADMIN — MORPHINE SULFATE 4 MG: 4 INJECTION, SOLUTION INTRAMUSCULAR; INTRAVENOUS at 12:13

## 2023-06-07 RX ADMIN — GABAPENTIN 300 MG: 300 CAPSULE ORAL at 20:57

## 2023-06-07 RX ADMIN — GABAPENTIN 300 MG: 300 CAPSULE ORAL at 16:17

## 2023-06-07 RX ADMIN — Medication 10 ML: at 20:57

## 2023-06-07 RX ADMIN — MORPHINE SULFATE 4 MG: 4 INJECTION, SOLUTION INTRAMUSCULAR; INTRAVENOUS at 16:19

## 2023-06-07 RX ADMIN — LACTULOSE 20 G: 20 SOLUTION ORAL at 08:48

## 2023-06-07 RX ADMIN — SENNOSIDES AND DOCUSATE SODIUM 2 TABLET: 8.6; 5 TABLET ORAL at 08:49

## 2023-06-07 RX ADMIN — LACTULOSE 30 G: 20 SOLUTION ORAL at 13:10

## 2023-06-07 RX ADMIN — LACTULOSE 30 G: 20 SOLUTION ORAL at 16:26

## 2023-06-07 RX ADMIN — FUROSEMIDE 40 MG: 40 TABLET ORAL at 08:48

## 2023-06-07 RX ADMIN — SPIRONOLACTONE 100 MG: 25 TABLET ORAL at 08:48

## 2023-06-07 RX ADMIN — MORPHINE SULFATE 4 MG: 4 INJECTION, SOLUTION INTRAMUSCULAR; INTRAVENOUS at 20:57

## 2023-06-07 RX ADMIN — CEFTRIAXONE SODIUM 2 G: 2 INJECTION, SOLUTION INTRAVENOUS at 16:17

## 2023-06-07 RX ADMIN — POLYETHYLENE GLYCOL 3350 17 G: 17 POWDER, FOR SOLUTION ORAL at 08:55

## 2023-06-07 RX ADMIN — ONDANSETRON 4 MG: 2 INJECTION INTRAMUSCULAR; INTRAVENOUS at 12:11

## 2023-06-07 RX ADMIN — MORPHINE SULFATE 4 MG: 4 INJECTION, SOLUTION INTRAMUSCULAR; INTRAVENOUS at 06:44

## 2023-06-07 RX ADMIN — Medication 10 ML: at 09:10

## 2023-06-07 RX ADMIN — GABAPENTIN 300 MG: 300 CAPSULE ORAL at 08:49

## 2023-06-07 RX ADMIN — MORPHINE SULFATE 4 MG: 4 INJECTION, SOLUTION INTRAMUSCULAR; INTRAVENOUS at 02:20

## 2023-06-07 RX ADMIN — PANTOPRAZOLE SODIUM 40 MG: 40 TABLET, DELAYED RELEASE ORAL at 05:19

## 2023-06-07 NOTE — PLAN OF CARE
Goal Outcome Evaluation:         Patient is A&O x4. VSS. Medicated for pain on shift, see MAR. No complaints from patient at time. Will continue to monitor.

## 2023-06-07 NOTE — PROGRESS NOTES
[facility]   Hospitalist Progress Note  Date: 2023  Patient Name: Lara Brock  : 1975  MRN: [id number]  Date of admission: 2023      Subjective   Subjective     Chief Complaint: Follow-up abdominal pain    Summary:Lara Brock is a 48 y.o. female past medical history of cirrhosis that presented to outside ER for evaluation of abdominal pain x4 days. Patient had paracentesis done at [place] on  unclear the results of that procedure however states that since that procedure she had persistent abdominal pain mostly localized around the procedure site. She has also been having drainage from the procedure site as well. She denies any fevers, chills, sweats, chest pain or shortness of breath, palpitations, diarrhea constipation, dysuria, weakness, rash. She describes abdominal pain as sharp, radiates diffusely, constant, no known aggravating or alleviating factors. She has associated nausea and vomiting. She presented back to [place] prior to transfer and there was concern for SBP due to her severe abdominal pain and guarding on exam. She received Rocephin and was transferred for further care.  Patient started on empiric antibiotics.  Lab work was essentially unremarkable.  CT of the abdomen demonstrated fluid within the abdominal wall approximating previous paracentesis.  Not enough ascites fluid for paracentesis per IR.    Interval Followup: Patient lying in bed appears very uncomfortable.  Continues to endorse abdominal pain mostly located left lateral side.  Patient developed nausea and vomiting this morning exacerbating her abdominal pain.  Patient also complaining of constipation past few days states she is scared to bear down due to increased abdominal pain.  Afebrile overnight, sinus rhythm 70s to 130s on telemetry review.  Blood pressure within normal limits.  Satting well on room air.    Review of Systems  Constitutional: Negative for fatigue and fever.   HENT:  Negative for sore throat and trouble swallowing.    Eyes: Negative for pain and discharge.   Respiratory: Negative for cough and shortness of breath.    Cardiovascular: Negative for chest pain and palpitations.   Gastrointestinal: Positive for abdominal pain, nausea and vomiting.   Endocrine: Negative for cold intolerance and heat intolerance.   Genitourinary: Negative for difficulty urinating and dysuria.   Musculoskeletal: Negative for back pain and neck stiffness.   Skin: Negative for color change and rash.   Neurological: Negative for syncope and headaches.   Hematological: Negative for adenopathy.   Psychiatric/Behavioral: Negative for confusion and hallucinations.    Objective   Objective     Vitals:   Temp:  [97.7 °F (36.5 °C)-98.5 °F (36.9 °C)] 98.3 °F (36.8 °C)  Heart Rate:  [] 100  Resp:  [18] 18  BP: (120-163)/(58-96) 120/58  Physical Exam   Gen. well-developed appearing stated age in no acute distress  HEENT: Normocephalic atraumatic moist membranes pupils equal round reactive light, no scleral icterus no conjunctival injection  Cardiovascular: regular rate and rhythm no murmurs rubs or gallops S1-S2, no lower extremity edema appreciated  Pulmonary: Clear to auscultation bilaterally no wheezes rales or rhonchi symmetric chest expansion, unlabored, no conversational dyspnea appreciated  Gastrointestinal: Distended firm tender to palpation especially over the left lateral aspect, diminished bowel sounds all 4 quadrants no rebound or guarding  Musculoskeletal: No clubbing cyanosis, warm and well-perfused, calves soft symmetric nontender bilaterally  Skin: Clean dry without rashs  Neuro: Cranial nerves II through XII intact grossly no sensorimotor deficits appreciated bilateral upper and lower extremities  Psych: Patient is calm cooperative and appropriate with exam not responding to internal stimuli  : No Arnold catheter no bladder distention no suprapubic tenderness    Result Review    Result  Review:  I have personally reviewed these results and agree with these findings:  [x]  Laboratory  LAB RESULTS:      Lab 06/07/23  0529 06/06/23  1110 06/06/23  0819 06/06/23 0339   WBC 4.01  --   --  5.03   HEMOGLOBIN 13.2  --   --  15.7   HEMATOCRIT 38.9  --   --  46.3   PLATELETS 60*  --   --  66*   NEUTROS ABS  --   --   --  3.56   IMMATURE GRANS (ABS)  --   --   --  0.01   LYMPHS ABS  --   --   --  1.04   MONOS ABS  --   --   --  0.36   EOS ABS  --   --   --  0.05   MCV 91.1  --   --  91.9   SED RATE  --   --   --  4   CRP  --   --   --  0.32   PROCALCITONIN  --   --   --  0.13   LACTATE  --  1.9 2.2* 2.6*   PROTIME  --   --   --  16.5*   APTT  --   --   --  29.0         Lab 06/07/23  0529 06/06/23  0819 06/06/23 0339   SODIUM 136  --  140   POTASSIUM 4.2 4.5 3.4*   CHLORIDE 99  --  102   CO2 30.2*  --  28.3   ANION GAP 6.8  --  9.7   BUN 8  --  10   CREATININE 0.75  --  0.88   EGFR 97.7  --  80.7   GLUCOSE 147*  --  78   CALCIUM 8.2*  --  8.7   MAGNESIUM  --   --  2.0         Lab 06/07/23  0529 06/06/23 0339   TOTAL PROTEIN 6.2 7.6   ALBUMIN 3.0* 3.6   GLOBULIN 3.2 4.0   ALT (SGPT) 192* 168*   AST (SGOT) 167* 157*   BILIRUBIN 0.7 0.9   ALK PHOS 250* 226*         Lab 06/06/23 0339   PROTIME 16.5*   INR 1.33*                 Brief Urine Lab Results  (Last result in the past 365 days)        Color   Clarity   Blood   Leuk Est   Nitrite   Protein   CREAT   Urine HCG        04/15/23 1609 Dark Yellow   Cloudy   Trace   Large (3+)   Negative   Trace                 Microbiology Results (last 10 days)       Procedure Component Value - Date/Time    Blood Culture - Blood, Arm, Left [[id number]]  (Normal) Collected: 06/06/23 0339    Lab Status: Preliminary result Specimen: Blood from Arm, Left Updated: 06/07/23 0400     Blood Culture No growth at 24 hours    Blood Culture - Blood, Arm, Left [[id number]]  (Normal) Collected: 06/06/23 0339    Lab Status: Preliminary result Specimen: Blood from Arm, Left Updated: 06/07/23  0400     Blood Culture No growth at 24 hours            [x]  Microbiology  [x]  Radiology  XR Chest 1 View    Result Date: 6/1/2023  No interval change. Normal chest. Electronically Signed: Ignacio Carlson MD 2023/06/01 at 12:10 CDT Reading Location ID and State: [id number] / [place] Tel [phone], Service support  [phone], Fax [phone]    US Abdomen Limited    Result Date: 6/6/2023    1. Trace amounts of ascitic fluid.  The paracentesis was canceled at this time.     Shayan Archer M.D.       Electronically Signed and Approved By: Shayan Archer M.D. on 6/06/2023 at 15:13             CT chest abdomen pelvis with contrast per contrast protocol    Result Date: 6/5/2023  1.   Minimal area of scar versus atelectasis in the lingula of the LEFT upper lobe. Remaining pulmonary parenchyma is clear. 2.  Microvascular cirrhosis and hepatomegaly. 3.  Persistent ascites. No organized fluid collection or abscess. 4.  No evidence of bowel obstruction abscess or free air. 5.  Interval development of subcutaneous emphysema along the RIGHT anterior abdominal wall, potentially from recent instrumentation. No organized abscess noted. No free air extends into the abdomen. 6.  No evidence of obstructive uropathy. Electronically Signed: Lencho Cee MD 2023/06/05 at 18:12 CDT Reading Location ID and State: [id number] / [place] Tel [phone], Service support  [phone], Fax [phone]     [x]  EKG/Telemetry   []  Cardiology/Vascular   []  Pathology  []  Old records  [x]  Other:  Scheduled Meds:cefTRIAXone, 2 g, Intravenous, Q24H  furosemide, 40 mg, Oral, Daily  gabapentin, 300 mg, Oral, TID  lactulose, 30 g, Oral, TID PC  pantoprazole, 40 mg, Oral, Q AM  senna-docusate sodium, 2 tablet, Oral, BID  sodium chloride, 10 mL, Intravenous, Q12H  spironolactone, 100 mg, Oral, Daily      Continuous Infusions:   PRN Meds:.  senna-docusate sodium **AND** polyethylene glycol **AND** bisacodyl **AND** bisacodyl    Calcium Replacement - Follow Nurse  / BPA Driven Protocol    hydrALAZINE    hydrOXYzine pamoate    Magnesium Standard Dose Replacement - Follow Nurse / BPA Driven Protocol    metoprolol tartrate    Morphine    Morphine    nitroglycerin    ondansetron    Phosphorus Replacement - Follow Nurse / BPA Driven Protocol    Potassium Replacement - Follow Nurse / BPA Driven Protocol    sodium chloride    sodium chloride    traMADol      Assessment & Plan   Assessment / Plan     Assessment/Plan:  Abdominal pain following paracentesis concern secondary to ascites infiltration into the abdominal wall versus SBP  Nausea vomiting  Constipation  Hyperammonemia  Hepatic cirrhosis  Thrombocytopenia likely secondary to cirrhosis  Hepatic coagulopathy        Patient admitted for further evaluation and treatment  Continue IV analgesics as needed  Continue empiric ceftriaxone  Continue as needed antiemetics  Advance diet as tolerated  Increase lactulose to 3 times daily  Continue home furosemide and spironolactone  Continue home PPI  Further patient was recommendations pending clinical course.       Discussed plan with bedside RN.    Disposition: Home once abdominal pain improves and tolerating a diet.    DVT prophylaxis:  Mechanical DVT prophylaxis orders are present.    CODE STATUS:   Medical Intervention Limits: NO intubation (DNI)  Code Status (Patient has no pulse and is not breathing): No CPR (Do Not Attempt to Resuscitate)  Medical Interventions (Patient has pulse or is breathing): Limited Support

## 2023-06-08 PROCEDURE — 99232 SBSQ HOSP IP/OBS MODERATE 35: CPT | Performed by: FAMILY MEDICINE

## 2023-06-08 PROCEDURE — 96365 THER/PROPH/DIAG IV INF INIT: CPT

## 2023-06-08 PROCEDURE — G0378 HOSPITAL OBSERVATION PER HR: HCPCS

## 2023-06-08 PROCEDURE — 0 CEFTRIAXONE PER 250 MG: Performed by: INTERNAL MEDICINE

## 2023-06-08 PROCEDURE — 25010000002 MORPHINE PER 10 MG: Performed by: INTERNAL MEDICINE

## 2023-06-08 PROCEDURE — 25010000002 ONDANSETRON PER 1 MG: Performed by: INTERNAL MEDICINE

## 2023-06-08 PROCEDURE — 96376 TX/PRO/DX INJ SAME DRUG ADON: CPT

## 2023-06-08 RX ORDER — OXYCODONE HYDROCHLORIDE 5 MG/1
10 TABLET ORAL EVERY 4 HOURS PRN
Status: DISCONTINUED | OUTPATIENT
Start: 2023-06-08 | End: 2023-06-13

## 2023-06-08 RX ORDER — OXYCODONE HYDROCHLORIDE 5 MG/1
5 TABLET ORAL EVERY 4 HOURS PRN
Status: DISCONTINUED | OUTPATIENT
Start: 2023-06-08 | End: 2023-06-13

## 2023-06-08 RX ORDER — ALUMINA, MAGNESIA, AND SIMETHICONE 2400; 2400; 240 MG/30ML; MG/30ML; MG/30ML
15 SUSPENSION ORAL EVERY 6 HOURS PRN
Status: DISCONTINUED | OUTPATIENT
Start: 2023-06-08 | End: 2023-06-13

## 2023-06-08 RX ADMIN — CEFTRIAXONE SODIUM 2 G: 2 INJECTION, SOLUTION INTRAVENOUS at 16:23

## 2023-06-08 RX ADMIN — Medication 10 ML: at 21:45

## 2023-06-08 RX ADMIN — MORPHINE SULFATE 4 MG: 4 INJECTION, SOLUTION INTRAMUSCULAR; INTRAVENOUS at 05:43

## 2023-06-08 RX ADMIN — MORPHINE SULFATE 4 MG: 4 INJECTION, SOLUTION INTRAMUSCULAR; INTRAVENOUS at 10:34

## 2023-06-08 RX ADMIN — LACTULOSE 30 G: 20 SOLUTION ORAL at 08:18

## 2023-06-08 RX ADMIN — GABAPENTIN 300 MG: 300 CAPSULE ORAL at 16:23

## 2023-06-08 RX ADMIN — ALUMINUM HYDROXIDE, MAGNESIUM HYDROXIDE, AND DIMETHICONE 15 ML: 400; 400; 40 SUSPENSION ORAL at 14:45

## 2023-06-08 RX ADMIN — GABAPENTIN 300 MG: 300 CAPSULE ORAL at 21:45

## 2023-06-08 RX ADMIN — OXYCODONE HYDROCHLORIDE 10 MG: 5 TABLET ORAL at 19:17

## 2023-06-08 RX ADMIN — Medication 10 ML: at 08:18

## 2023-06-08 RX ADMIN — SENNOSIDES AND DOCUSATE SODIUM 2 TABLET: 8.6; 5 TABLET ORAL at 21:45

## 2023-06-08 RX ADMIN — FUROSEMIDE 40 MG: 40 TABLET ORAL at 08:18

## 2023-06-08 RX ADMIN — GABAPENTIN 300 MG: 300 CAPSULE ORAL at 08:19

## 2023-06-08 RX ADMIN — PANTOPRAZOLE SODIUM 40 MG: 40 TABLET, DELAYED RELEASE ORAL at 05:39

## 2023-06-08 RX ADMIN — SPIRONOLACTONE 100 MG: 25 TABLET ORAL at 08:18

## 2023-06-08 RX ADMIN — SENNOSIDES AND DOCUSATE SODIUM 2 TABLET: 8.6; 5 TABLET ORAL at 08:18

## 2023-06-08 RX ADMIN — MORPHINE SULFATE 4 MG: 4 INJECTION, SOLUTION INTRAMUSCULAR; INTRAVENOUS at 14:45

## 2023-06-08 RX ADMIN — MORPHINE SULFATE 4 MG: 4 INJECTION, SOLUTION INTRAMUSCULAR; INTRAVENOUS at 01:44

## 2023-06-08 RX ADMIN — OXYCODONE HYDROCHLORIDE 10 MG: 5 TABLET ORAL at 23:49

## 2023-06-08 RX ADMIN — ONDANSETRON 4 MG: 2 INJECTION INTRAMUSCULAR; INTRAVENOUS at 16:23

## 2023-06-08 NOTE — PROGRESS NOTES
[facility]   Hospitalist Progress Note  Date: 2023  Patient Name: Lara Brock  : 1975  MRN: [id number]  Date of admission: 2023      Subjective   Subjective     Chief Complaint: Follow-up abdominal pain    Summary:Lara Brock is a 48 y.o. female past medical history of cirrhosis that presented to outside ER for evaluation of abdominal pain x4 days. Patient had paracentesis done at [place] on  unclear the results of that procedure however states that since that procedure she had persistent abdominal pain mostly localized around the procedure site. She has also been having drainage from the procedure site as well. She denies any fevers, chills, sweats, chest pain or shortness of breath, palpitations, diarrhea constipation, dysuria, weakness, rash. She describes abdominal pain as sharp, radiates diffusely, constant, no known aggravating or alleviating factors. She has associated nausea and vomiting. She presented back to [place] prior to transfer and there was concern for SBP due to her severe abdominal pain and guarding on exam. She received Rocephin and was transferred for further care.  Patient started on empiric antibiotics.  Lab work was essentially unremarkable.  CT of the abdomen demonstrated fluid within the abdominal wall approximating previous paracentesis.  Not enough ascites fluid for paracentesis per IR.  Suspect patient's abdominal pain due to leakage of ascites from peritoneal cavity into the subcutaneous tissues.    Interval Followup: Patient standing up appears more comfortable today.  Continues to endorse abdominal pain mostly located left lateral side moving more posteriorly.  Nausea and vomiting improved.  Constipation resolved.  Afebrile overnight, sinus rhythm 70s to 130s on telemetry review.  Blood pressure within normal limits.  Satting well on room air.    Review of Systems  Constitutional: Negative for fatigue and fever.   HENT: Negative  for sore throat and trouble swallowing.    Eyes: Negative for pain and discharge.   Respiratory: Negative for cough and shortness of breath.    Cardiovascular: Negative for chest pain and palpitations.   Gastrointestinal: Positive for abdominal pain, nausea and vomiting improved.   Endocrine: Negative for cold intolerance and heat intolerance.   Genitourinary: Negative for difficulty urinating and dysuria.   Musculoskeletal: Negative for back pain and neck stiffness.   Skin: Negative for color change and rash.   Neurological: Negative for syncope and headaches.   Hematological: Negative for adenopathy.   Psychiatric/Behavioral: Negative for confusion and hallucinations.    Objective   Objective     Vitals:   Temp:  [97.8 °F (36.6 °C)-98.7 °F (37.1 °C)] 98 °F (36.7 °C)  Heart Rate:  [62-96] 62  Resp:  [18] 18  BP: (123-146)/(68-95) 126/92  Physical Exam   Gen. well-developed appearing stated age in no acute distress  HEENT: Normocephalic atraumatic moist membranes pupils equal round reactive light, no scleral icterus no conjunctival injection  Cardiovascular: regular rate and rhythm no murmurs rubs or gallops S1-S2, no lower extremity edema appreciated  Pulmonary: Clear to auscultation bilaterally no wheezes rales or rhonchi symmetric chest expansion, unlabored, no conversational dyspnea appreciated  Gastrointestinal: Distended firm tender to palpation especially over the left lateral aspect extending posteriorly and inferiorly with fairly clear demarcation and texture of the subcutaneous tissues on palpation, diminished bowel sounds all 4 quadrants no rebound or guarding  Musculoskeletal: No clubbing cyanosis, warm and well-perfused, calves soft symmetric nontender bilaterally  Skin: Clean dry without rashs  Neuro: Cranial nerves II through XII intact grossly no sensorimotor deficits appreciated bilateral upper and lower extremities  Psych: Patient is calm cooperative and appropriate with exam not responding to  internal stimuli  : No Arnold catheter no bladder distention no suprapubic tenderness    Result Review    Result Review:  I have personally reviewed these results and agree with these findings:  [x]  Laboratory  LAB RESULTS:      Lab 06/07/23  0529 06/06/23  1110 06/06/23  0819 06/06/23 0339   WBC 4.01  --   --  5.03   HEMOGLOBIN 13.2  --   --  15.7   HEMATOCRIT 38.9  --   --  46.3   PLATELETS 60*  --   --  66*   NEUTROS ABS  --   --   --  3.56   IMMATURE GRANS (ABS)  --   --   --  0.01   LYMPHS ABS  --   --   --  1.04   MONOS ABS  --   --   --  0.36   EOS ABS  --   --   --  0.05   MCV 91.1  --   --  91.9   SED RATE  --   --   --  4   CRP  --   --   --  0.32   PROCALCITONIN  --   --   --  0.13   LACTATE  --  1.9 2.2* 2.6*   PROTIME  --   --   --  16.5*   APTT  --   --   --  29.0           Lab 06/07/23  0529 06/06/23  0819 06/06/23 0339   SODIUM 136  --  140   POTASSIUM 4.2 4.5 3.4*   CHLORIDE 99  --  102   CO2 30.2*  --  28.3   ANION GAP 6.8  --  9.7   BUN 8  --  10   CREATININE 0.75  --  0.88   EGFR 97.7  --  80.7   GLUCOSE 147*  --  78   CALCIUM 8.2*  --  8.7   MAGNESIUM  --   --  2.0           Lab 06/07/23  0529 06/06/23 0339   TOTAL PROTEIN 6.2 7.6   ALBUMIN 3.0* 3.6   GLOBULIN 3.2 4.0   ALT (SGPT) 192* 168*   AST (SGOT) 167* 157*   BILIRUBIN 0.7 0.9   ALK PHOS 250* 226*           Lab 06/06/23  0339   PROTIME 16.5*   INR 1.33*                   Brief Urine Lab Results  (Last result in the past 365 days)        Color   Clarity   Blood   Leuk Est   Nitrite   Protein   CREAT   Urine HCG        04/15/23 1609 Dark Yellow   Cloudy   Trace   Large (3+)   Negative   Trace                 Microbiology Results (last 10 days)       Procedure Component Value - Date/Time    Blood Culture - Blood, Arm, Left [[id number]]  (Normal) Collected: 06/06/23 0339    Lab Status: Preliminary result Specimen: Blood from Arm, Left Updated: 06/08/23 0400     Blood Culture No growth at 2 days    Blood Culture - Blood, Arm, Left  [[id number]]  (Normal) Collected: 06/06/23 0339    Lab Status: Preliminary result Specimen: Blood from Arm, Left Updated: 06/08/23 0400     Blood Culture No growth at 2 days            [x]  Microbiology  [x]  Radiology  XR Chest 1 View    Result Date: 6/1/2023  No interval change. Normal chest. Electronically Signed: Ignacio Carlson MD 2023/06/01 at 12:10 CDT Reading Location ID and State: [id number] / [place] Tel [phone], Service support  [phone], Fax [phone]    US Abdomen Limited    Result Date: 6/6/2023    1. Trace amounts of ascitic fluid.  The paracentesis was canceled at this time.     Shayan Archer M.D.       Electronically Signed and Approved By: Shayan Archer M.D. on 6/06/2023 at 15:13             CT chest abdomen pelvis with contrast per contrast protocol    Result Date: 6/5/2023  1.   Minimal area of scar versus atelectasis in the lingula of the LEFT upper lobe. Remaining pulmonary parenchyma is clear. 2.  Microvascular cirrhosis and hepatomegaly. 3.  Persistent ascites. No organized fluid collection or abscess. 4.  No evidence of bowel obstruction abscess or free air. 5.  Interval development of subcutaneous emphysema along the RIGHT anterior abdominal wall, potentially from recent instrumentation. No organized abscess noted. No free air extends into the abdomen. 6.  No evidence of obstructive uropathy. Electronically Signed: Lencho Cee MD 2023/06/05 at 18:12 CDT Reading Location ID and State: [id number] / [place] Tel [phone], Service support  [phone], Fax [phone]     [x]  EKG/Telemetry   []  Cardiology/Vascular   []  Pathology  []  Old records  [x]  Other:  Scheduled Meds:cefTRIAXone, 2 g, Intravenous, Q24H  furosemide, 40 mg, Oral, Daily  gabapentin, 300 mg, Oral, TID  lactulose, 30 g, Oral, TID PC  pantoprazole, 40 mg, Oral, Q AM  senna-docusate sodium, 2 tablet, Oral, BID  sodium chloride, 10 mL, Intravenous, Q12H  spironolactone, 100 mg, Oral, Daily      Continuous Infusions:   PRN  Meds:.  aluminum-magnesium hydroxide-simethicone    senna-docusate sodium **AND** polyethylene glycol **AND** bisacodyl **AND** bisacodyl    Calcium Replacement - Follow Nurse / BPA Driven Protocol    hydrALAZINE    hydrOXYzine pamoate    Magnesium Standard Dose Replacement - Follow Nurse / BPA Driven Protocol    metoprolol tartrate    Morphine    Morphine    nitroglycerin    ondansetron    oxyCODONE    oxyCODONE    Phosphorus Replacement - Follow Nurse / BPA Driven Protocol    Potassium Replacement - Follow Nurse / BPA Driven Protocol    sodium chloride    sodium chloride      Assessment & Plan   Assessment / Plan     Assessment/Plan:  Abdominal pain following paracentesis concern secondary to ascites infiltration into the abdominal wall versus SBP  Nausea vomiting  Constipation  Hyperammonemia  Hepatic cirrhosis  Thrombocytopenia likely secondary to cirrhosis  Hepatic coagulopathy        Patient admitted for further evaluation and treatment  Continue p.o. analgesics IV IV analgesics as needed breakthrough  Continue empiric ceftriaxone  Continue as needed antiemetics  Advance diet as tolerated  Continue lactulose to 3 times daily  Continue home furosemide and spironolactone  Continue home PPI  Further patient was recommendations pending clinical course.       Discussed plan with bedside RN.    Disposition: Home once abdominal pain controlled with oral analgesics and tolerating a diet.    DVT prophylaxis:  Mechanical DVT prophylaxis orders are present.    CODE STATUS:   Medical Intervention Limits: NO intubation (DNI)  Code Status (Patient has no pulse and is not breathing): No CPR (Do Not Attempt to Resuscitate)  Medical Interventions (Patient has pulse or is breathing): Limited Support

## 2023-06-08 NOTE — PLAN OF CARE
Goal Outcome Evaluation:                      Patient is alert and oriented x4.  Vital signs stable.  Patient complained of pain x2, and was medicated per MAR.  Patient complained of heartburn, and was medicated per MAR.  Patient complained of nausea, and was medicated per MAR.  Continuing plan of care.

## 2023-06-09 PROCEDURE — 25010000002 ONDANSETRON PER 1 MG: Performed by: INTERNAL MEDICINE

## 2023-06-09 PROCEDURE — 0 CEFTRIAXONE PER 250 MG: Performed by: INTERNAL MEDICINE

## 2023-06-09 PROCEDURE — 25010000002 MORPHINE PER 10 MG: Performed by: INTERNAL MEDICINE

## 2023-06-09 PROCEDURE — 96376 TX/PRO/DX INJ SAME DRUG ADON: CPT

## 2023-06-09 PROCEDURE — G0378 HOSPITAL OBSERVATION PER HR: HCPCS

## 2023-06-09 PROCEDURE — 96366 THER/PROPH/DIAG IV INF ADDON: CPT

## 2023-06-09 PROCEDURE — 99233 SBSQ HOSP IP/OBS HIGH 50: CPT | Performed by: FAMILY MEDICINE

## 2023-06-09 RX ORDER — FUROSEMIDE 40 MG/1
40 TABLET ORAL
Status: DISCONTINUED | OUTPATIENT
Start: 2023-06-09 | End: 2023-06-12

## 2023-06-09 RX ORDER — SPIRONOLACTONE 100 MG/1
200 TABLET, FILM COATED ORAL DAILY
Status: DISCONTINUED | OUTPATIENT
Start: 2023-06-10 | End: 2023-06-13

## 2023-06-09 RX ADMIN — PANTOPRAZOLE SODIUM 40 MG: 40 TABLET, DELAYED RELEASE ORAL at 05:52

## 2023-06-09 RX ADMIN — OXYCODONE HYDROCHLORIDE 10 MG: 5 TABLET ORAL at 14:55

## 2023-06-09 RX ADMIN — FUROSEMIDE 40 MG: 40 TABLET ORAL at 17:21

## 2023-06-09 RX ADMIN — FUROSEMIDE 40 MG: 40 TABLET ORAL at 08:17

## 2023-06-09 RX ADMIN — GABAPENTIN 300 MG: 300 CAPSULE ORAL at 20:12

## 2023-06-09 RX ADMIN — CEFTRIAXONE SODIUM 2 G: 2 INJECTION, SOLUTION INTRAVENOUS at 16:18

## 2023-06-09 RX ADMIN — GABAPENTIN 300 MG: 300 CAPSULE ORAL at 16:18

## 2023-06-09 RX ADMIN — OXYCODONE HYDROCHLORIDE 10 MG: 5 TABLET ORAL at 19:47

## 2023-06-09 RX ADMIN — GABAPENTIN 300 MG: 300 CAPSULE ORAL at 08:16

## 2023-06-09 RX ADMIN — Medication 10 ML: at 07:42

## 2023-06-09 RX ADMIN — Medication 10 ML: at 20:13

## 2023-06-09 RX ADMIN — SENNOSIDES AND DOCUSATE SODIUM 2 TABLET: 8.6; 5 TABLET ORAL at 08:17

## 2023-06-09 RX ADMIN — OXYCODONE HYDROCHLORIDE 10 MG: 5 TABLET ORAL at 03:34

## 2023-06-09 RX ADMIN — MORPHINE SULFATE 4 MG: 4 INJECTION, SOLUTION INTRAMUSCULAR; INTRAVENOUS at 17:21

## 2023-06-09 RX ADMIN — MORPHINE SULFATE 4 MG: 4 INJECTION, SOLUTION INTRAMUSCULAR; INTRAVENOUS at 23:08

## 2023-06-09 RX ADMIN — MORPHINE SULFATE 4 MG: 4 INJECTION, SOLUTION INTRAMUSCULAR; INTRAVENOUS at 12:06

## 2023-06-09 RX ADMIN — LACTULOSE 30 G: 20 SOLUTION ORAL at 08:16

## 2023-06-09 RX ADMIN — OXYCODONE HYDROCHLORIDE 10 MG: 5 TABLET ORAL at 07:42

## 2023-06-09 RX ADMIN — ONDANSETRON 4 MG: 2 INJECTION INTRAMUSCULAR; INTRAVENOUS at 07:42

## 2023-06-09 RX ADMIN — SPIRONOLACTONE 100 MG: 25 TABLET ORAL at 08:17

## 2023-06-09 NOTE — PROGRESS NOTES
[facility]   Hospitalist Progress Note  Date: 2023  Patient Name: Lara Brock  : 1975  MRN: [id number]  Date of admission: 2023      Subjective   Subjective     Chief Complaint: Follow-up abdominal pain    Summary:Lara Brock is a 48 y.o. female past medical history of cirrhosis that presented to outside ER for evaluation of abdominal pain x4 days. Patient had paracentesis done at [place] on  unclear the results of that procedure however states that since that procedure she had persistent abdominal pain mostly localized around the procedure site. She has also been having drainage from the procedure site as well. She denies any fevers, chills, sweats, chest pain or shortness of breath, palpitations, diarrhea constipation, dysuria, weakness, rash. She describes abdominal pain as sharp, radiates diffusely, constant, no known aggravating or alleviating factors. She has associated nausea and vomiting. She presented back to [place] prior to transfer and there was concern for SBP due to her severe abdominal pain and guarding on exam. She received Rocephin and was transferred for further care.  Patient started on empiric antibiotics.  Lab work was essentially unremarkable.  CT of the abdomen demonstrated fluid within the abdominal wall approximating previous paracentesis.  Not enough ascites fluid for paracentesis per IR.  Suspect patient's abdominal pain due to leakage of ascites from peritoneal cavity into the subcutaneous tissues.    Interval Followup: Patient sitting up at the bedside appears more comfortable today.  Patient states that she had an episode of nausea and vomiting which exacerbated her abdominal pain.  Continues to endorse abdominal pain mostly located left lateral side moving more anterior and laterally.  No further issues with constipation.  Afebrile overnight, sinus rhythm 80s to 120s on telemetry review.  Blood pressure within normal limits.   Satting well on room air.    Review of Systems  Constitutional: Negative for fatigue and fever.   HENT: Negative for sore throat and trouble swallowing.    Eyes: Negative for pain and discharge.   Respiratory: Negative for cough and shortness of breath.    Cardiovascular: Negative for chest pain and palpitations.   Gastrointestinal: Positive for abdominal pain, nausea and vomiting.   Endocrine: Negative for cold intolerance and heat intolerance.   Genitourinary: Negative for difficulty urinating and dysuria.   Musculoskeletal: Negative for back pain and neck stiffness.   Skin: Negative for color change and rash.   Neurological: Negative for syncope and headaches.   Hematological: Negative for adenopathy.   Psychiatric/Behavioral: Negative for confusion and hallucinations.    Objective   Objective     Vitals:   Temp:  [97.52 °F (36.4 °C)-98.9 °F (37.2 °C)] 98.6 °F (37 °C)  Heart Rate:  [100-104] 102  Resp:  [18-20] 18  BP: (125-140)/(72-96) 125/72  Physical Exam   Gen. well-developed appearing stated age in no acute distress  HEENT: Normocephalic atraumatic moist membranes pupils equal round reactive light, no scleral icterus no conjunctival injection  Cardiovascular: regular rate and rhythm no murmurs rubs or gallops S1-S2, no lower extremity edema appreciated  Pulmonary: Clear to auscultation bilaterally no wheezes rales or rhonchi symmetric chest expansion, unlabored, no conversational dyspnea appreciated  Gastrointestinal: Distended firm tender to palpation especially over the left medial aspect extending posteriorly and inferiorly with fairly clear demarcation and texture of the subcutaneous tissues on palpation, diminished bowel sounds all 4 quadrants no rebound or guarding  Musculoskeletal: No clubbing cyanosis, warm and well-perfused, calves soft symmetric nontender bilaterally  Skin: Clean dry without rashs  Neuro: Cranial nerves II through XII intact grossly no sensorimotor deficits appreciated bilateral  upper and lower extremities  Psych: Patient is calm cooperative and appropriate with exam not responding to internal stimuli  : No Arnold catheter no bladder distention no suprapubic tenderness    Result Review    Result Review:  I have personally reviewed these results and agree with these findings:  [x]  Laboratory  LAB RESULTS:      Lab 06/07/23  0529 06/06/23  1110 06/06/23  0819 06/06/23  0339   WBC 4.01  --   --  5.03   HEMOGLOBIN 13.2  --   --  15.7   HEMATOCRIT 38.9  --   --  46.3   PLATELETS 60*  --   --  66*   NEUTROS ABS  --   --   --  3.56   IMMATURE GRANS (ABS)  --   --   --  0.01   LYMPHS ABS  --   --   --  1.04   MONOS ABS  --   --   --  0.36   EOS ABS  --   --   --  0.05   MCV 91.1  --   --  91.9   SED RATE  --   --   --  4   CRP  --   --   --  0.32   PROCALCITONIN  --   --   --  0.13   LACTATE  --  1.9 2.2* 2.6*   PROTIME  --   --   --  16.5*   APTT  --   --   --  29.0         Lab 06/07/23  0529 06/06/23  0819 06/06/23  0339   SODIUM 136  --  140   POTASSIUM 4.2 4.5 3.4*   CHLORIDE 99  --  102   CO2 30.2*  --  28.3   ANION GAP 6.8  --  9.7   BUN 8  --  10   CREATININE 0.75  --  0.88   EGFR 97.7  --  80.7   GLUCOSE 147*  --  78   CALCIUM 8.2*  --  8.7   MAGNESIUM  --   --  2.0         Lab 06/07/23  0529 06/06/23  0339   TOTAL PROTEIN 6.2 7.6   ALBUMIN 3.0* 3.6   GLOBULIN 3.2 4.0   ALT (SGPT) 192* 168*   AST (SGOT) 167* 157*   BILIRUBIN 0.7 0.9   ALK PHOS 250* 226*         Lab 06/06/23  0339   PROTIME 16.5*   INR 1.33*                 Brief Urine Lab Results  (Last result in the past 365 days)      Color   Clarity   Blood   Leuk Est   Nitrite   Protein   CREAT   Urine HCG        04/15/23 1609 Dark Yellow   Cloudy   Trace   Large (3+)   Negative   Trace               Microbiology Results (last 10 days)     Procedure Component Value - Date/Time    Blood Culture - Blood, Arm, Left [[id number]]  (Normal) Collected: 06/06/23 0339    Lab Status: Preliminary result Specimen: Blood from Arm, Left Updated:  06/09/23 0400     Blood Culture No growth at 3 days    Blood Culture - Blood, Arm, Left [[id number]]  (Normal) Collected: 06/06/23 0339    Lab Status: Preliminary result Specimen: Blood from Arm, Left Updated: 06/09/23 0400     Blood Culture No growth at 3 days          [x]  Microbiology  [x]  Radiology  US Abdomen Limited    Result Date: 6/6/2023    1. Trace amounts of ascitic fluid.  The paracentesis was canceled at this time.     Shayan Archer M.D.       Electronically Signed and Approved By: Shayan Archer M.D. on 6/06/2023 at 15:13             CT chest abdomen pelvis with contrast per contrast protocol    Result Date: 6/5/2023  1.   Minimal area of scar versus atelectasis in the lingula of the LEFT upper lobe. Remaining pulmonary parenchyma is clear. 2.  Microvascular cirrhosis and hepatomegaly. 3.  Persistent ascites. No organized fluid collection or abscess. 4.  No evidence of bowel obstruction abscess or free air. 5.  Interval development of subcutaneous emphysema along the RIGHT anterior abdominal wall, potentially from recent instrumentation. No organized abscess noted. No free air extends into the abdomen. 6.  No evidence of obstructive uropathy. Electronically Signed: Lencho Cee MD 2023/06/05 at 18:12 CDT Reading Location ID and State: [address] / [place] Tel [phone], Service support  [phone], Fax [phone]    [x]  EKG/Telemetry   []  Cardiology/Vascular   []  Pathology  []  Old records  [x]  Other:  Scheduled Meds:furosemide, 40 mg, Oral, BID  gabapentin, 300 mg, Oral, TID  lactulose, 30 g, Oral, TID PC  pantoprazole, 40 mg, Oral, Q AM  senna-docusate sodium, 2 tablet, Oral, BID  sodium chloride, 10 mL, Intravenous, Q12H  [START ON 6/10/2023] spironolactone, 200 mg, Oral, Daily      Continuous Infusions:   PRN Meds:.•  aluminum-magnesium hydroxide-simethicone  •  senna-docusate sodium **AND** polyethylene glycol **AND** bisacodyl **AND** bisacodyl  •  Calcium Replacement - Follow Nurse / BPA  Driven Protocol  •  hydrALAZINE  •  hydrOXYzine pamoate  •  Magnesium Standard Dose Replacement - Follow Nurse / BPA Driven Protocol  •  metoprolol tartrate  •  Morphine  •  Morphine  •  nitroglycerin  •  ondansetron  •  oxyCODONE  •  oxyCODONE  •  Phosphorus Replacement - Follow Nurse / BPA Driven Protocol  •  Potassium Replacement - Follow Nurse / BPA Driven Protocol  •  sodium chloride  •  sodium chloride      Assessment & Plan   Assessment / Plan     Assessment/Plan:  Abdominal pain following paracentesis concern secondary to ascites infiltration into the abdominal wall versus SBP  Nausea vomiting  Constipation  Hyperammonemia  Hepatic cirrhosis  Thrombocytopenia likely secondary to cirrhosis  Hepatic coagulopathy        Patient admitted for further evaluation and treatment  Continue p.o. analgesics IV IV analgesics as needed breakthrough  Continue empiric ceftriaxone  Continue as needed antiemetics  Advance diet as tolerated  Continue lactulose to 3 times daily  Increase furosemide and spironolactone  Repeat CBC CMP in a.m. monitor renal function and electrolytes during diuresis  Continue home PPI  Further patient was recommendations pending clinical course.       Discussed plan with bedside RN.    Disposition: Home once abdominal pain controlled with oral analgesics and tolerating a diet.    DVT prophylaxis:  Mechanical DVT prophylaxis orders are present.    CODE STATUS:   Medical Intervention Limits: NO intubation (DNI)  Code Status (Patient has no pulse and is not breathing): No CPR (Do Not Attempt to Resuscitate)  Medical Interventions (Patient has pulse or is breathing): Limited Support

## 2023-06-09 NOTE — PLAN OF CARE
Goal Outcome Evaluation:  Plan of Care Reviewed With: patient        Progress: no change  Outcome Evaluation: Patient alert and oriented. continues to complain of right lower quadrant pain. medicated with OXYIR x 2 this shift.  voiding well. continue plan of care.

## 2023-06-09 NOTE — PLAN OF CARE
Goal Outcome Evaluation:                      Patient is alert and oriented x4.  Vital signs stable.  Patient complained of pain x4, and was medicate per MAR.  Patient complained of nausea x1, and was medicated per MAR.  IV antibiotics per MAR.  Continuing plan of care.

## 2023-06-09 NOTE — PLAN OF CARE
Problem: Adult Inpatient Plan of Care  Goal: Plan of Care Review  Outcome: Ongoing, Progressing  Goal: Patient-Specific Goal (Individualized)  Outcome: Ongoing, Progressing  Goal: Absence of Hospital-Acquired Illness or Injury  Outcome: Ongoing, Progressing  Intervention: Identify and Manage Fall Risk  Recent Flowsheet Documentation  Taken 6/9/2023 0200 by Odalis Leyva, RN  Safety Promotion/Fall Prevention: safety round/check completed  Taken 6/9/2023 0100 by Odalis Leyva RN  Safety Promotion/Fall Prevention: safety round/check completed  Taken 6/9/2023 0000 by Odalis Leyva RN  Safety Promotion/Fall Prevention: safety round/check completed  Goal: Optimal Comfort and Wellbeing  Outcome: Ongoing, Progressing  Goal: Readiness for Transition of Care  Outcome: Ongoing, Progressing     Problem: Pain Acute  Goal: Acceptable Pain Control and Functional Ability  Outcome: Ongoing, Progressing     Problem: Skin Injury Risk Increased  Goal: Skin Health and Integrity  Outcome: Ongoing, Progressing   Goal Outcome Evaluation:      No changes at this time

## 2023-06-10 ENCOUNTER — APPOINTMENT (OUTPATIENT)
Dept: CT IMAGING | Facility: HOSPITAL | Age: 48
End: 2023-06-10
Payer: MEDICAID

## 2023-06-10 LAB
ALBUMIN SERPL-MCNC: 3.4 G/DL (ref 3.5–5.2)
ALBUMIN/GLOB SERPL: 0.9 G/DL
ALP SERPL-CCNC: 254 U/L (ref 39–117)
ALT SERPL W P-5'-P-CCNC: 372 U/L (ref 1–33)
ANION GAP SERPL CALCULATED.3IONS-SCNC: 3.3 MMOL/L (ref 5–15)
AST SERPL-CCNC: 294 U/L (ref 1–32)
BILIRUB SERPL-MCNC: 1 MG/DL (ref 0–1.2)
BUN SERPL-MCNC: 11 MG/DL (ref 6–20)
BUN/CREAT SERPL: 12.9 (ref 7–25)
CALCIUM SPEC-SCNC: 8.6 MG/DL (ref 8.6–10.5)
CHLORIDE SERPL-SCNC: 97 MMOL/L (ref 98–107)
CO2 SERPL-SCNC: 30.7 MMOL/L (ref 22–29)
CREAT SERPL-MCNC: 0.85 MG/DL (ref 0.57–1)
DEPRECATED RDW RBC AUTO: 48.3 FL (ref 37–54)
EGFRCR SERPLBLD CKD-EPI 2021: 84.1 ML/MIN/1.73
ERYTHROCYTE [DISTWIDTH] IN BLOOD BY AUTOMATED COUNT: 14.5 % (ref 12.3–15.4)
GLOBULIN UR ELPH-MCNC: 3.6 GM/DL
GLUCOSE SERPL-MCNC: 124 MG/DL (ref 65–99)
HCT VFR BLD AUTO: 42.8 % (ref 34–46.6)
HGB BLD-MCNC: 14.5 G/DL (ref 12–15.9)
MCH RBC QN AUTO: 30.9 PG (ref 26.6–33)
MCHC RBC AUTO-ENTMCNC: 33.9 G/DL (ref 31.5–35.7)
MCV RBC AUTO: 91.3 FL (ref 79–97)
PLATELET # BLD AUTO: 55 10*3/MM3 (ref 140–450)
PMV BLD AUTO: 11.9 FL (ref 6–12)
POTASSIUM SERPL-SCNC: 4.2 MMOL/L (ref 3.5–5.2)
PROT SERPL-MCNC: 7 G/DL (ref 6–8.5)
RBC # BLD AUTO: 4.69 10*6/MM3 (ref 3.77–5.28)
SODIUM SERPL-SCNC: 131 MMOL/L (ref 136–145)
WBC NRBC COR # BLD: 5.16 10*3/MM3 (ref 3.4–10.8)

## 2023-06-10 PROCEDURE — 25510000001 IOPAMIDOL PER 1 ML: Performed by: FAMILY MEDICINE

## 2023-06-10 PROCEDURE — 25010000002 ONDANSETRON PER 1 MG: Performed by: INTERNAL MEDICINE

## 2023-06-10 PROCEDURE — 96376 TX/PRO/DX INJ SAME DRUG ADON: CPT

## 2023-06-10 PROCEDURE — 85027 COMPLETE CBC AUTOMATED: CPT | Performed by: FAMILY MEDICINE

## 2023-06-10 PROCEDURE — 99233 SBSQ HOSP IP/OBS HIGH 50: CPT | Performed by: FAMILY MEDICINE

## 2023-06-10 PROCEDURE — G0378 HOSPITAL OBSERVATION PER HR: HCPCS

## 2023-06-10 PROCEDURE — 74177 CT ABD & PELVIS W/CONTRAST: CPT

## 2023-06-10 PROCEDURE — 25010000002 MORPHINE PER 10 MG: Performed by: INTERNAL MEDICINE

## 2023-06-10 PROCEDURE — 80053 COMPREHEN METABOLIC PANEL: CPT | Performed by: FAMILY MEDICINE

## 2023-06-10 RX ADMIN — GABAPENTIN 300 MG: 300 CAPSULE ORAL at 17:57

## 2023-06-10 RX ADMIN — PANTOPRAZOLE SODIUM 40 MG: 40 TABLET, DELAYED RELEASE ORAL at 05:37

## 2023-06-10 RX ADMIN — OXYCODONE HYDROCHLORIDE 10 MG: 5 TABLET ORAL at 13:57

## 2023-06-10 RX ADMIN — OXYCODONE HYDROCHLORIDE 10 MG: 5 TABLET ORAL at 07:30

## 2023-06-10 RX ADMIN — SPIRONOLACTONE 200 MG: 100 TABLET ORAL at 08:38

## 2023-06-10 RX ADMIN — IOPAMIDOL 100 ML: 755 INJECTION, SOLUTION INTRAVENOUS at 19:42

## 2023-06-10 RX ADMIN — ONDANSETRON 4 MG: 2 INJECTION INTRAMUSCULAR; INTRAVENOUS at 17:55

## 2023-06-10 RX ADMIN — GABAPENTIN 300 MG: 300 CAPSULE ORAL at 20:00

## 2023-06-10 RX ADMIN — MORPHINE SULFATE 4 MG: 4 INJECTION, SOLUTION INTRAMUSCULAR; INTRAVENOUS at 19:57

## 2023-06-10 RX ADMIN — MORPHINE SULFATE 2 MG: 2 INJECTION, SOLUTION INTRAMUSCULAR; INTRAVENOUS at 10:30

## 2023-06-10 RX ADMIN — LACTULOSE 30 G: 20 SOLUTION ORAL at 13:58

## 2023-06-10 RX ADMIN — GABAPENTIN 300 MG: 300 CAPSULE ORAL at 08:38

## 2023-06-10 RX ADMIN — Medication 10 ML: at 08:38

## 2023-06-10 RX ADMIN — SENNOSIDES AND DOCUSATE SODIUM 2 TABLET: 8.6; 5 TABLET ORAL at 08:37

## 2023-06-10 RX ADMIN — OXYCODONE HYDROCHLORIDE 10 MG: 5 TABLET ORAL at 17:58

## 2023-06-10 RX ADMIN — FUROSEMIDE 40 MG: 40 TABLET ORAL at 17:57

## 2023-06-10 RX ADMIN — OXYCODONE HYDROCHLORIDE 10 MG: 5 TABLET ORAL at 01:24

## 2023-06-10 RX ADMIN — MORPHINE SULFATE 4 MG: 4 INJECTION, SOLUTION INTRAMUSCULAR; INTRAVENOUS at 04:19

## 2023-06-10 RX ADMIN — FUROSEMIDE 40 MG: 40 TABLET ORAL at 08:37

## 2023-06-10 RX ADMIN — LACTULOSE 30 G: 20 SOLUTION ORAL at 08:37

## 2023-06-10 RX ADMIN — Medication 10 ML: at 20:00

## 2023-06-10 RX ADMIN — OXYCODONE HYDROCHLORIDE 10 MG: 5 TABLET ORAL at 22:53

## 2023-06-10 RX ADMIN — MORPHINE SULFATE 4 MG: 4 INJECTION, SOLUTION INTRAMUSCULAR; INTRAVENOUS at 15:29

## 2023-06-10 NOTE — PROGRESS NOTES
[facility]   Hospitalist Progress Note  Date: 6/10/2023  Patient Name: Lara Brock  : 1975  MRN: [id number]  Date of admission: 2023      Subjective   Subjective     Chief Complaint: Follow-up abdominal pain    Summary:Lara Brock is a 48 y.o. female past medical history of cirrhosis that presented to outside ER for evaluation of abdominal pain x4 days. Patient had paracentesis done at [place] on  unclear the results of that procedure however states that since that procedure she had persistent abdominal pain mostly localized around the procedure site. She has also been having drainage from the procedure site as well. She denies any fevers, chills, sweats, chest pain or shortness of breath, palpitations, diarrhea constipation, dysuria, weakness, rash. She describes abdominal pain as sharp, radiates diffusely, constant, no known aggravating or alleviating factors. She has associated nausea and vomiting. She presented back to [place] prior to transfer and there was concern for SBP due to her severe abdominal pain and guarding on exam. She received Rocephin and was transferred for further care.  Patient started on empiric antibiotics.  Lab work was essentially unremarkable.  CT of the abdomen demonstrated fluid within the abdominal wall approximating previous paracentesis.  Not enough ascites fluid for paracentesis per IR.  Suspect patient's abdominal pain due to leakage of ascites from peritoneal cavity into the subcutaneous tissues.    Interval Followup: Patient sitting up at the bedside appears fairly comfortable today.  Patient states nausea and vomiting improved.  Continues to endorse abdominal pain mostly located left lateral side moving more anterior and laterally.  No further issues with constipation.  Afebrile overnight, sinus rhythm 80s to 110s on telemetry review.  Blood pressure within normal limits.  ALT AST trending up.  Platelets remain low but stable.   Satting well on room air.    Review of Systems  Constitutional: Negative for fatigue and fever.   HENT: Negative for sore throat and trouble swallowing.    Eyes: Negative for pain and discharge.   Respiratory: Negative for cough and shortness of breath.    Cardiovascular: Negative for chest pain and palpitations.   Gastrointestinal: Positive for abdominal pain, nausea and vomiting.   Endocrine: Negative for cold intolerance and heat intolerance.   Genitourinary: Negative for difficulty urinating and dysuria.   Musculoskeletal: Negative for back pain and neck stiffness.   Skin: Negative for color change and rash.   Neurological: Negative for syncope and headaches.   Hematological: Negative for adenopathy.   Psychiatric/Behavioral: Negative for confusion and hallucinations.    Objective   Objective     Vitals:   Temp:  [98 °F (36.7 °C)-98.4 °F (36.9 °C)] 98 °F (36.7 °C)  Heart Rate:  [] 108  Resp:  [18-20] 18  BP: (128-147)/(87-98) 147/98  Physical Exam   Gen. well-developed appearing stated age in no acute distress  HEENT: Normocephalic atraumatic moist membranes pupils equal round reactive light, no scleral icterus no conjunctival injection  Cardiovascular: regular rate and rhythm no murmurs rubs or gallops S1-S2, no lower extremity edema appreciated  Pulmonary: Clear to auscultation bilaterally no wheezes rales or rhonchi symmetric chest expansion, unlabored, no conversational dyspnea appreciated  Gastrointestinal: Distended firm tender to palpation especially over the left medial aspect extending posteriorly and inferiorly with fairly clear demarcation and texture of the subcutaneous tissues on palpation, diminished bowel sounds all 4 quadrants no rebound or guarding  Musculoskeletal: No clubbing cyanosis, warm and well-perfused, calves soft symmetric nontender bilaterally  Skin: Clean dry without rashs  Neuro: Cranial nerves II through XII intact grossly no sensorimotor deficits appreciated bilateral upper  and lower extremities  Psych: Patient is calm cooperative and appropriate with exam not responding to internal stimuli  : No Arnold catheter no bladder distention no suprapubic tenderness    Result Review    Result Review:  I have personally reviewed these results and agree with these findings:  [x]  Laboratory  LAB RESULTS:      Lab 06/10/23  0449 06/07/23  0529 06/06/23  1110 06/06/23  0819 06/06/23  0339   WBC 5.16 4.01  --   --  5.03   HEMOGLOBIN 14.5 13.2  --   --  15.7   HEMATOCRIT 42.8 38.9  --   --  46.3   PLATELETS 55* 60*  --   --  66*   NEUTROS ABS  --   --   --   --  3.56   IMMATURE GRANS (ABS)  --   --   --   --  0.01   LYMPHS ABS  --   --   --   --  1.04   MONOS ABS  --   --   --   --  0.36   EOS ABS  --   --   --   --  0.05   MCV 91.3 91.1  --   --  91.9   SED RATE  --   --   --   --  4   CRP  --   --   --   --  0.32   PROCALCITONIN  --   --   --   --  0.13   LACTATE  --   --  1.9 2.2* 2.6*   PROTIME  --   --   --   --  16.5*   APTT  --   --   --   --  29.0           Lab 06/10/23  0449 06/07/23  0529 06/06/23  0819 06/06/23  0339   SODIUM 131* 136  --  140   POTASSIUM 4.2 4.2 4.5 3.4*   CHLORIDE 97* 99  --  102   CO2 30.7* 30.2*  --  28.3   ANION GAP 3.3* 6.8  --  9.7   BUN 11 8  --  10   CREATININE 0.85 0.75  --  0.88   EGFR 84.1 97.7  --  80.7   GLUCOSE 124* 147*  --  78   CALCIUM 8.6 8.2*  --  8.7   MAGNESIUM  --   --   --  2.0           Lab 06/10/23  0449 06/07/23  0529 06/06/23  0339   TOTAL PROTEIN 7.0 6.2 7.6   ALBUMIN 3.4* 3.0* 3.6   GLOBULIN 3.6 3.2 4.0   ALT (SGPT) 372* 192* 168*   AST (SGOT) 294* 167* 157*   BILIRUBIN 1.0 0.7 0.9   ALK PHOS 254* 250* 226*           Lab 06/06/23  0339   PROTIME 16.5*   INR 1.33*                   Brief Urine Lab Results  (Last result in the past 365 days)        Color   Clarity   Blood   Leuk Est   Nitrite   Protein   CREAT   Urine HCG        04/15/23 1609 Dark Yellow   Cloudy   Trace   Large (3+)   Negative   Trace                 Microbiology Results  (last 10 days)       Procedure Component Value - Date/Time    Blood Culture - Blood, Arm, Left [[id number]]  (Normal) Collected: 06/06/23 0339    Lab Status: Preliminary result Specimen: Blood from Arm, Left Updated: 06/10/23 0400     Blood Culture No growth at 4 days    Blood Culture - Blood, Arm, Left [[id number]]  (Normal) Collected: 06/06/23 0339    Lab Status: Preliminary result Specimen: Blood from Arm, Left Updated: 06/10/23 0400     Blood Culture No growth at 4 days            [x]  Microbiology  [x]  Radiology  US Abdomen Limited    Result Date: 6/6/2023    1. Trace amounts of ascitic fluid.  The paracentesis was canceled at this time.     Shayan Archer M.D.       Electronically Signed and Approved By: Shayan Archer M.D. on 6/06/2023 at 15:13             CT chest abdomen pelvis with contrast per contrast protocol    Result Date: 6/5/2023  1.   Minimal area of scar versus atelectasis in the lingula of the LEFT upper lobe. Remaining pulmonary parenchyma is clear. 2.  Microvascular cirrhosis and hepatomegaly. 3.  Persistent ascites. No organized fluid collection or abscess. 4.  No evidence of bowel obstruction abscess or free air. 5.  Interval development of subcutaneous emphysema along the RIGHT anterior abdominal wall, potentially from recent instrumentation. No organized abscess noted. No free air extends into the abdomen. 6.  No evidence of obstructive uropathy. Electronically Signed: Lencho Cee MD 2023/06/05 at 18:12 CDT Reading Location ID and State: [id number] / [place] Tel [phone], Service support  [phone], Fax [phone]     [x]  EKG/Telemetry   []  Cardiology/Vascular   []  Pathology  []  Old records  [x]  Other:  Scheduled Meds:furosemide, 40 mg, Oral, BID  gabapentin, 300 mg, Oral, TID  lactulose, 30 g, Oral, TID PC  pantoprazole, 40 mg, Oral, Q AM  senna-docusate sodium, 2 tablet, Oral, BID  sodium chloride, 10 mL, Intravenous, Q12H  spironolactone, 200 mg, Oral, Daily      Continuous  Infusions:   PRN Meds:.  aluminum-magnesium hydroxide-simethicone    senna-docusate sodium **AND** polyethylene glycol **AND** bisacodyl **AND** bisacodyl    Calcium Replacement - Follow Nurse / BPA Driven Protocol    hydrALAZINE    hydrOXYzine pamoate    Magnesium Standard Dose Replacement - Follow Nurse / BPA Driven Protocol    metoprolol tartrate    Morphine    Morphine    nitroglycerin    ondansetron    oxyCODONE    oxyCODONE    Phosphorus Replacement - Follow Nurse / BPA Driven Protocol    Potassium Replacement - Follow Nurse / BPA Driven Protocol    sodium chloride    sodium chloride      Assessment & Plan   Assessment / Plan     Assessment/Plan:  Abdominal pain following paracentesis concern secondary to ascites infiltration into the abdominal wall versus SBP  Nausea vomiting  Constipation  Hyperammonemia  Hepatic cirrhosis  Thrombocytopenia likely secondary to cirrhosis  Hepatic coagulopathy        Patient admitted for further evaluation and treatment  Get CT abdomen and pelvis with IV contrast  Continue p.o. analgesics, IV analgesics as needed breakthrough  Pleated course of empiric ceftriaxone  Continue as needed antiemetics  Advance diet as tolerated  Continue lactulose to 3 times daily  Continue furosemide and spironolactone  Repeat CMP in a.m. monitor LFTs and electrolytes during diuresis  Continue home PPI  Further patient was recommendations pending clinical course.       Discussed plan with bedside RN.    Disposition: Home once abdominal pain controlled with oral analgesics and tolerating a diet.    DVT prophylaxis:  Mechanical DVT prophylaxis orders are present.    CODE STATUS:   Medical Intervention Limits: NO intubation (DNI)  Code Status (Patient has no pulse and is not breathing): No CPR (Do Not Attempt to Resuscitate)  Medical Interventions (Patient has pulse or is breathing): Limited Support

## 2023-06-10 NOTE — PLAN OF CARE
Problem: Adult Inpatient Plan of Care  Goal: Plan of Care Review  Outcome: Ongoing, Progressing  Goal: Patient-Specific Goal (Individualized)  Outcome: Ongoing, Progressing  Goal: Absence of Hospital-Acquired Illness or Injury  Outcome: Ongoing, Progressing  Intervention: Identify and Manage Fall Risk  Intervention: Prevent and Manage VTE (Venous Thromboembolism) Risk  Goal: Optimal Comfort and Wellbeing  Outcome: Ongoing, Progressing  Intervention: Monitor Pain and Promote Comfort  Intervention: Provide Person-Centered Care  Goal: Readiness for Transition of Care  Outcome: Ongoing, Progressing     Problem: Pain Acute  Goal: Acceptable Pain Control and Functional Ability  Outcome: Ongoing, Progressing  Intervention: Develop Pain Management Plan  Intervention: Optimize Psychosocial Wellbeing     Problem: Skin Injury Risk Increased  Goal: Skin Health and Integrity  Outcome: Ongoing, Progressing   Goal Outcome Evaluation:   Patient alert & oriented x 4, VSS, no significant changes in status, medicated for pain for MAR, continuing plan of care.

## 2023-06-10 NOTE — PLAN OF CARE
Goal Outcome Evaluation:      Patient is alert and oriented x4. VSS. Patient has tolerated clear liquids this shift, with no n/v. Patient c/o pain throughout shift, see MAR. All needs met at this time.

## 2023-06-11 LAB
ALBUMIN SERPL-MCNC: 3.2 G/DL (ref 3.5–5.2)
ALBUMIN/GLOB SERPL: 0.8 G/DL
ALP SERPL-CCNC: 236 U/L (ref 39–117)
ALT SERPL W P-5'-P-CCNC: 341 U/L (ref 1–33)
ANION GAP SERPL CALCULATED.3IONS-SCNC: 7.3 MMOL/L (ref 5–15)
AST SERPL-CCNC: 251 U/L (ref 1–32)
BACTERIA SPEC AEROBE CULT: NORMAL
BACTERIA SPEC AEROBE CULT: NORMAL
BILIRUB SERPL-MCNC: 1.3 MG/DL (ref 0–1.2)
BUN SERPL-MCNC: 14 MG/DL (ref 6–20)
BUN/CREAT SERPL: 16.9 (ref 7–25)
CALCIUM SPEC-SCNC: 8.6 MG/DL (ref 8.6–10.5)
CHLORIDE SERPL-SCNC: 94 MMOL/L (ref 98–107)
CO2 SERPL-SCNC: 28.7 MMOL/L (ref 22–29)
CREAT SERPL-MCNC: 0.83 MG/DL (ref 0.57–1)
EGFRCR SERPLBLD CKD-EPI 2021: 86.5 ML/MIN/1.73
GLOBULIN UR ELPH-MCNC: 3.8 GM/DL
GLUCOSE SERPL-MCNC: 150 MG/DL (ref 65–99)
POTASSIUM SERPL-SCNC: 4 MMOL/L (ref 3.5–5.2)
PROT SERPL-MCNC: 7 G/DL (ref 6–8.5)
SODIUM SERPL-SCNC: 130 MMOL/L (ref 136–145)
WHOLE BLOOD HOLD SPECIMEN: NORMAL

## 2023-06-11 PROCEDURE — 80053 COMPREHEN METABOLIC PANEL: CPT | Performed by: FAMILY MEDICINE

## 2023-06-11 PROCEDURE — 25010000002 ONDANSETRON PER 1 MG: Performed by: INTERNAL MEDICINE

## 2023-06-11 PROCEDURE — G0378 HOSPITAL OBSERVATION PER HR: HCPCS

## 2023-06-11 PROCEDURE — 96375 TX/PRO/DX INJ NEW DRUG ADDON: CPT

## 2023-06-11 PROCEDURE — 99233 SBSQ HOSP IP/OBS HIGH 50: CPT | Performed by: FAMILY MEDICINE

## 2023-06-11 PROCEDURE — 96376 TX/PRO/DX INJ SAME DRUG ADON: CPT

## 2023-06-11 PROCEDURE — 0 CEFTRIAXONE PER 250 MG: Performed by: FAMILY MEDICINE

## 2023-06-11 PROCEDURE — 25010000002 HYDROMORPHONE 1 MG/ML SOLUTION: Performed by: FAMILY MEDICINE

## 2023-06-11 PROCEDURE — 25010000002 MORPHINE PER 10 MG: Performed by: INTERNAL MEDICINE

## 2023-06-11 RX ORDER — CEFTRIAXONE SODIUM 2 G/50ML
2 INJECTION, SOLUTION INTRAVENOUS EVERY 24 HOURS
Status: COMPLETED | OUTPATIENT
Start: 2023-06-11 | End: 2023-06-12

## 2023-06-11 RX ADMIN — OXYCODONE HYDROCHLORIDE 10 MG: 5 TABLET ORAL at 19:36

## 2023-06-11 RX ADMIN — CEFTRIAXONE SODIUM 2 G: 2 INJECTION, SOLUTION INTRAVENOUS at 12:18

## 2023-06-11 RX ADMIN — LACTULOSE 30 G: 20 SOLUTION ORAL at 12:18

## 2023-06-11 RX ADMIN — SENNOSIDES AND DOCUSATE SODIUM 2 TABLET: 8.6; 5 TABLET ORAL at 08:52

## 2023-06-11 RX ADMIN — Medication 10 ML: at 08:54

## 2023-06-11 RX ADMIN — OXYCODONE HYDROCHLORIDE 10 MG: 5 TABLET ORAL at 13:29

## 2023-06-11 RX ADMIN — HYDROMORPHONE HYDROCHLORIDE 1 MG: 1 INJECTION, SOLUTION INTRAMUSCULAR; INTRAVENOUS; SUBCUTANEOUS at 21:21

## 2023-06-11 RX ADMIN — OXYCODONE HYDROCHLORIDE 10 MG: 5 TABLET ORAL at 23:24

## 2023-06-11 RX ADMIN — GABAPENTIN 300 MG: 300 CAPSULE ORAL at 08:52

## 2023-06-11 RX ADMIN — HYDROMORPHONE HYDROCHLORIDE 1 MG: 1 INJECTION, SOLUTION INTRAMUSCULAR; INTRAVENOUS; SUBCUTANEOUS at 17:23

## 2023-06-11 RX ADMIN — MORPHINE SULFATE 2 MG: 2 INJECTION, SOLUTION INTRAMUSCULAR; INTRAVENOUS at 14:02

## 2023-06-11 RX ADMIN — ALUMINUM HYDROXIDE, MAGNESIUM HYDROXIDE, AND DIMETHICONE 15 ML: 400; 400; 40 SUSPENSION ORAL at 17:29

## 2023-06-11 RX ADMIN — GABAPENTIN 300 MG: 300 CAPSULE ORAL at 17:23

## 2023-06-11 RX ADMIN — GABAPENTIN 300 MG: 300 CAPSULE ORAL at 21:21

## 2023-06-11 RX ADMIN — OXYCODONE HYDROCHLORIDE 10 MG: 5 TABLET ORAL at 08:56

## 2023-06-11 RX ADMIN — FUROSEMIDE 40 MG: 40 TABLET ORAL at 17:23

## 2023-06-11 RX ADMIN — LACTULOSE 30 G: 20 SOLUTION ORAL at 08:52

## 2023-06-11 RX ADMIN — MORPHINE SULFATE 4 MG: 4 INJECTION, SOLUTION INTRAMUSCULAR; INTRAVENOUS at 11:15

## 2023-06-11 RX ADMIN — OXYCODONE HYDROCHLORIDE 10 MG: 5 TABLET ORAL at 04:49

## 2023-06-11 RX ADMIN — ONDANSETRON 4 MG: 2 INJECTION INTRAMUSCULAR; INTRAVENOUS at 05:42

## 2023-06-11 RX ADMIN — SENNOSIDES AND DOCUSATE SODIUM 2 TABLET: 8.6; 5 TABLET ORAL at 21:21

## 2023-06-11 RX ADMIN — MORPHINE SULFATE 4 MG: 4 INJECTION, SOLUTION INTRAMUSCULAR; INTRAVENOUS at 02:36

## 2023-06-11 RX ADMIN — FUROSEMIDE 40 MG: 40 TABLET ORAL at 08:52

## 2023-06-11 RX ADMIN — MORPHINE SULFATE 4 MG: 4 INJECTION, SOLUTION INTRAMUSCULAR; INTRAVENOUS at 06:48

## 2023-06-11 RX ADMIN — PANTOPRAZOLE SODIUM 40 MG: 40 TABLET, DELAYED RELEASE ORAL at 05:04

## 2023-06-11 RX ADMIN — SPIRONOLACTONE 200 MG: 100 TABLET ORAL at 09:19

## 2023-06-11 RX ADMIN — BISACODYL 5 MG: 5 TABLET, COATED ORAL at 21:27

## 2023-06-11 NOTE — PLAN OF CARE
Problem: Adult Inpatient Plan of Care  Goal: Plan of Care Review  Outcome: Ongoing, Not Progressing  Goal: Patient-Specific Goal (Individualized)  Outcome: Ongoing, Not Progressing  Goal: Absence of Hospital-Acquired Illness or Injury  Outcome: Ongoing, Not Progressing  Intervention: Identify and Manage Fall Risk  Intervention: Prevent and Manage VTE (Venous Thromboembolism) Risk  Goal: Optimal Comfort and Wellbeing  Outcome: Ongoing, Not Progressing  Intervention: Provide Person-Centered Care  Goal: Readiness for Transition of Care  Outcome: Ongoing, Not Progressing     Problem: Pain Acute  Goal: Acceptable Pain Control and Functional Ability  Outcome: Ongoing, Not Progressing  Intervention: Optimize Psychosocial Wellbeing     Problem: Skin Injury Risk Increased  Goal: Skin Health and Integrity  Outcome: Ongoing, Not Progressing   Goal Outcome Evaluation:   Patient alert & oriented x 4, VSS, no significant changes in status during shift, medicated for severe pain multiple times per MAR, continuing plan of care.

## 2023-06-11 NOTE — PROGRESS NOTES
[facility]   Hospitalist Progress Note  Date: 2023  Patient Name: Lara Brock  : 1975  MRN: [id number]  Date of admission: 2023      Subjective   Subjective     Chief Complaint: Follow-up abdominal pain    Summary:Lara Brock is a 48 y.o. female past medical history of cirrhosis that presented to outside ER for evaluation of abdominal pain x4 days. Patient had paracentesis done at [place] on  unclear the results of that procedure however states that since that procedure she had persistent abdominal pain mostly localized around the procedure site. She has also been having drainage from the procedure site as well. She denies any fevers, chills, sweats, chest pain or shortness of breath, palpitations, diarrhea constipation, dysuria, weakness, rash. She describes abdominal pain as sharp, radiates diffusely, constant, no known aggravating or alleviating factors. She has associated nausea and vomiting. She presented back to [place] prior to transfer and there was concern for SBP due to her severe abdominal pain and guarding on exam. She received Rocephin and was transferred for further care.  Patient started on empiric antibiotics.  Lab work was essentially unremarkable.  CT of the abdomen demonstrated fluid within the abdominal wall approximating previous paracentesis.  Not enough ascites fluid for paracentesis per IR.  Suspect patient's abdominal pain due to leakage of ascites from peritoneal cavity into the subcutaneous tissues.    Interval Followup: Patient sitting up at the bedside appears uncomfortable today.  Patient states nausea and vomiting worse after lunch yesterday.  Continues to endorse abdominal pain.  No further issues with constipation.  Afebrile overnight, sinus rhythm 90s to 130s on telemetry review.  Blood pressure within normal limits.  Satting well on room air. ALT AST trending down slightly.  CT abd significant for large amount of ascites but  negative for any other acute processes.  Again demonstrated oral hypertension hepatic cirrhosis and splenomegaly.      Review of Systems  Constitutional: Negative for fatigue and fever.   HENT: Negative for sore throat and trouble swallowing.    Eyes: Negative for pain and discharge.   Respiratory: Negative for cough and shortness of breath.    Cardiovascular: Negative for chest pain and palpitations.   Gastrointestinal: Positive for abdominal pain, nausea and vomiting.   Endocrine: Negative for cold intolerance and heat intolerance.   Genitourinary: Negative for difficulty urinating and dysuria.   Musculoskeletal: Negative for back pain and neck stiffness.   Skin: Negative for color change and rash.   Neurological: Negative for syncope and headaches.   Hematological: Negative for adenopathy.   Psychiatric/Behavioral: Negative for confusion and hallucinations.    Objective   Objective     Vitals:   Temp:  [97.6 °F (36.4 °C)-98.8 °F (37.1 °C)] 97.6 °F (36.4 °C)  Heart Rate:  [110-117] 113  Resp:  [16-18] 18  BP: (130-152)/() 152/98  Physical Exam   Gen. well-developed appearing stated age in no acute distress  HEENT: Normocephalic atraumatic moist membranes pupils equal round reactive light, no scleral icterus no conjunctival injection  Cardiovascular: regular rate and rhythm no murmurs rubs or gallops S1-S2, no lower extremity edema appreciated  Pulmonary: Clear to auscultation bilaterally no wheezes rales or rhonchi symmetric chest expansion, unlabored, no conversational dyspnea appreciated  Gastrointestinal: Distended firm tender to palpation positive bowel sounds all 4 quadrants no rebound or guarding  Musculoskeletal: No clubbing cyanosis, warm and well-perfused, calves soft symmetric nontender bilaterally  Skin: Clean dry without rashs  Neuro: Cranial nerves II through XII intact grossly no sensorimotor deficits appreciated bilateral upper and lower extremities  Psych: Patient is calm cooperative and  appropriate with exam not responding to internal stimuli  : No Arnold catheter no bladder distention no suprapubic tenderness    Result Review    Result Review:  I have personally reviewed these results and agree with these findings:  [x]  Laboratory  LAB RESULTS:      Lab 06/10/23  0449 06/07/23  0529 06/06/23  1110 06/06/23  0819 06/06/23  0339   WBC 5.16 4.01  --   --  5.03   HEMOGLOBIN 14.5 13.2  --   --  15.7   HEMATOCRIT 42.8 38.9  --   --  46.3   PLATELETS 55* 60*  --   --  66*   NEUTROS ABS  --   --   --   --  3.56   IMMATURE GRANS (ABS)  --   --   --   --  0.01   LYMPHS ABS  --   --   --   --  1.04   MONOS ABS  --   --   --   --  0.36   EOS ABS  --   --   --   --  0.05   MCV 91.3 91.1  --   --  91.9   SED RATE  --   --   --   --  4   CRP  --   --   --   --  0.32   PROCALCITONIN  --   --   --   --  0.13   LACTATE  --   --  1.9 2.2* 2.6*   PROTIME  --   --   --   --  16.5*   APTT  --   --   --   --  29.0           Lab 06/11/23  0437 06/10/23  0449 06/07/23  0529 06/06/23  0819 06/06/23  0339   SODIUM 130* 131* 136  --  140   POTASSIUM 4.0 4.2 4.2 4.5 3.4*   CHLORIDE 94* 97* 99  --  102   CO2 28.7 30.7* 30.2*  --  28.3   ANION GAP 7.3 3.3* 6.8  --  9.7   BUN 14 11 8  --  10   CREATININE 0.83 0.85 0.75  --  0.88   EGFR 86.5 84.1 97.7  --  80.7   GLUCOSE 150* 124* 147*  --  78   CALCIUM 8.6 8.6 8.2*  --  8.7   MAGNESIUM  --   --   --   --  2.0           Lab 06/11/23  0437 06/10/23  0449 06/07/23  0529 06/06/23  0339   TOTAL PROTEIN 7.0 7.0 6.2 7.6   ALBUMIN 3.2* 3.4* 3.0* 3.6   GLOBULIN 3.8 3.6 3.2 4.0   ALT (SGPT) 341* 372* 192* 168*   AST (SGOT) 251* 294* 167* 157*   BILIRUBIN 1.3* 1.0 0.7 0.9   ALK PHOS 236* 254* 250* 226*           Lab 06/06/23  0339   PROTIME 16.5*   INR 1.33*                   Brief Urine Lab Results  (Last result in the past 365 days)        Color   Clarity   Blood   Leuk Est   Nitrite   Protein   CREAT   Urine HCG        04/15/23 1609 Dark Yellow   Cloudy   Trace   Large (3+)    Negative   Trace                 Microbiology Results (last 10 days)       Procedure Component Value - Date/Time    Blood Culture - Blood, Arm, Left [[id number]]  (Normal) Collected: 06/06/23 0339    Lab Status: Final result Specimen: Blood from Arm, Left Updated: 06/11/23 0400     Blood Culture No growth at 5 days    Blood Culture - Blood, Arm, Left [[id number]]  (Normal) Collected: 06/06/23 0339    Lab Status: Final result Specimen: Blood from Arm, Left Updated: 06/11/23 0400     Blood Culture No growth at 5 days            [x]  Microbiology  [x]  Radiology  CT Abdomen Pelvis With Contrast    Result Date: 6/11/2023   Cirrhosis, portal hypertension, splenomegaly, portosystemic venous shunting, and a large volume of ascites are identified.  Please see above comments for further detail.    Please note that portions of this note were completed with a voice recognition program.  CATHIE BROWNING JR, MD       Electronically Signed and Approved By: CATHIE BROWNING JR, MD on 6/11/2023 at 4:28              US Abdomen Limited    Result Date: 6/6/2023    1. Trace amounts of ascitic fluid.  The paracentesis was canceled at this time.     Shayan Archer M.D.       Electronically Signed and Approved By: Shayan Archer M.D. on 6/06/2023 at 15:13             CT chest abdomen pelvis with contrast per contrast protocol    Result Date: 6/5/2023  1.   Minimal area of scar versus atelectasis in the lingula of the LEFT upper lobe. Remaining pulmonary parenchyma is clear. 2.  Microvascular cirrhosis and hepatomegaly. 3.  Persistent ascites. No organized fluid collection or abscess. 4.  No evidence of bowel obstruction abscess or free air. 5.  Interval development of subcutaneous emphysema along the RIGHT anterior abdominal wall, potentially from recent instrumentation. No organized abscess noted. No free air extends into the abdomen. 6.  No evidence of obstructive uropathy. Electronically Signed: Lencho Cee MD 2023/06/05 at 18:12 CDT  Reading Location ID and State: [address] / [place] Tel [phone], Service support  [phone], Fax [phone]     [x]  EKG/Telemetry   []  Cardiology/Vascular   []  Pathology  []  Old records  [x]  Other:  Scheduled Meds:cefTRIAXone, 2 g, Intravenous, Q24H  furosemide, 40 mg, Oral, BID  gabapentin, 300 mg, Oral, TID  lactulose, 30 g, Oral, TID PC  pantoprazole, 40 mg, Oral, Q AM  senna-docusate sodium, 2 tablet, Oral, BID  sodium chloride, 10 mL, Intravenous, Q12H  spironolactone, 200 mg, Oral, Daily      Continuous Infusions:   PRN Meds:.  aluminum-magnesium hydroxide-simethicone    senna-docusate sodium **AND** polyethylene glycol **AND** bisacodyl **AND** bisacodyl    Calcium Replacement - Follow Nurse / BPA Driven Protocol    hydrALAZINE    HYDROmorphone    HYDROmorphone    hydrOXYzine pamoate    Magnesium Standard Dose Replacement - Follow Nurse / BPA Driven Protocol    metoprolol tartrate    nitroglycerin    ondansetron    oxyCODONE    oxyCODONE    Phosphorus Replacement - Follow Nurse / BPA Driven Protocol    Potassium Replacement - Follow Nurse / BPA Driven Protocol    sodium chloride    sodium chloride      Assessment & Plan   Assessment / Plan     Assessment/Plan:  Abdominal pain following paracentesis concern secondary to ascites infiltration into the abdominal wall versus SBP  Nausea vomiting  Constipation  Hyperammonemia  Hepatic cirrhosis  Thrombocytopenia likely secondary to cirrhosis  Hepatic coagulopathy        Patient admitted for further evaluation and treatment  Plan on therapeutic and diagnostic paracentesis with interventional radiology  Continue p.o. analgesics, IV analgesics as needed breakthrough  Continue ceftriaxone to complete a 7-day course due to continued abdominal pain  Continue as needed antiemetics  Advance diet as tolerated  Continue lactulose to 3 times daily and titrate to 2-3 bowel movements daily  Continue furosemide and spironolactone  Repeat CMP in a.m. monitor LFTs and  electrolytes during diuresis  Continue home PPI  Further patient was recommendations pending clinical course.       Discussed plan with bedside RN.    Disposition: Home once abdominal pain controlled with oral analgesics and tolerating a diet.    DVT prophylaxis:  Mechanical DVT prophylaxis orders are present.    CODE STATUS:   Medical Intervention Limits: NO intubation (DNI)  Code Status (Patient has no pulse and is not breathing): No CPR (Do Not Attempt to Resuscitate)  Medical Interventions (Patient has pulse or is breathing): Limited Support

## 2023-06-12 ENCOUNTER — APPOINTMENT (OUTPATIENT)
Dept: INTERVENTIONAL RADIOLOGY/VASCULAR | Facility: HOSPITAL | Age: 48
End: 2023-06-12
Payer: MEDICAID

## 2023-06-12 LAB
ALBUMIN SERPL-MCNC: 3.4 G/DL (ref 3.5–5.2)
ALBUMIN/GLOB SERPL: 0.8 G/DL
ALP SERPL-CCNC: 249 U/L (ref 39–117)
ALT SERPL W P-5'-P-CCNC: 325 U/L (ref 1–33)
ANION GAP SERPL CALCULATED.3IONS-SCNC: 6.9 MMOL/L (ref 5–15)
AST SERPL-CCNC: 217 U/L (ref 1–32)
BILIRUB SERPL-MCNC: 1.4 MG/DL (ref 0–1.2)
BUN SERPL-MCNC: 16 MG/DL (ref 6–20)
BUN/CREAT SERPL: 20.5 (ref 7–25)
CALCIUM SPEC-SCNC: 8.6 MG/DL (ref 8.6–10.5)
CHLORIDE SERPL-SCNC: 91 MMOL/L (ref 98–107)
CO2 SERPL-SCNC: 32.1 MMOL/L (ref 22–29)
CREAT SERPL-MCNC: 0.78 MG/DL (ref 0.57–1)
DEPRECATED RDW RBC AUTO: 48.8 FL (ref 37–54)
EGFRCR SERPLBLD CKD-EPI 2021: 93.2 ML/MIN/1.73
ERYTHROCYTE [DISTWIDTH] IN BLOOD BY AUTOMATED COUNT: 14.3 % (ref 12.3–15.4)
GLOBULIN UR ELPH-MCNC: 4.1 GM/DL
GLUCOSE SERPL-MCNC: 174 MG/DL (ref 65–99)
HCT VFR BLD AUTO: 39.8 % (ref 34–46.6)
HGB BLD-MCNC: 13.7 G/DL (ref 12–15.9)
INR PPP: 1.27 (ref 0.86–1.15)
MCH RBC QN AUTO: 31.7 PG (ref 26.6–33)
MCHC RBC AUTO-ENTMCNC: 34.4 G/DL (ref 31.5–35.7)
MCV RBC AUTO: 92.1 FL (ref 79–97)
PLATELET # BLD AUTO: 63 10*3/MM3 (ref 140–450)
PMV BLD AUTO: 11 FL (ref 6–12)
POTASSIUM SERPL-SCNC: 4 MMOL/L (ref 3.5–5.2)
PROT SERPL-MCNC: 7.5 G/DL (ref 6–8.5)
PROTHROMBIN TIME: 16 SECONDS (ref 11.8–14.9)
RBC # BLD AUTO: 4.32 10*6/MM3 (ref 3.77–5.28)
SODIUM SERPL-SCNC: 130 MMOL/L (ref 136–145)
WBC NRBC COR # BLD: 7.42 10*3/MM3 (ref 3.4–10.8)

## 2023-06-12 PROCEDURE — 96376 TX/PRO/DX INJ SAME DRUG ADON: CPT

## 2023-06-12 PROCEDURE — 99233 SBSQ HOSP IP/OBS HIGH 50: CPT | Performed by: FAMILY MEDICINE

## 2023-06-12 PROCEDURE — G0378 HOSPITAL OBSERVATION PER HR: HCPCS

## 2023-06-12 PROCEDURE — 85610 PROTHROMBIN TIME: CPT | Performed by: FAMILY MEDICINE

## 2023-06-12 PROCEDURE — 80053 COMPREHEN METABOLIC PANEL: CPT | Performed by: FAMILY MEDICINE

## 2023-06-12 PROCEDURE — 25010000002 HYDROMORPHONE 1 MG/ML SOLUTION: Performed by: FAMILY MEDICINE

## 2023-06-12 PROCEDURE — 85027 COMPLETE CBC AUTOMATED: CPT | Performed by: FAMILY MEDICINE

## 2023-06-12 PROCEDURE — 0 CEFTRIAXONE PER 250 MG: Performed by: FAMILY MEDICINE

## 2023-06-12 PROCEDURE — 76705 ECHO EXAM OF ABDOMEN: CPT

## 2023-06-12 PROCEDURE — 25010000002 ONDANSETRON PER 1 MG: Performed by: INTERNAL MEDICINE

## 2023-06-12 RX ORDER — FUROSEMIDE 40 MG/1
80 TABLET ORAL
Status: DISCONTINUED | OUTPATIENT
Start: 2023-06-12 | End: 2023-06-13

## 2023-06-12 RX ADMIN — HYDROMORPHONE HYDROCHLORIDE 1 MG: 1 INJECTION, SOLUTION INTRAMUSCULAR; INTRAVENOUS; SUBCUTANEOUS at 07:51

## 2023-06-12 RX ADMIN — FUROSEMIDE 40 MG: 40 TABLET ORAL at 07:52

## 2023-06-12 RX ADMIN — SPIRONOLACTONE 200 MG: 100 TABLET ORAL at 07:53

## 2023-06-12 RX ADMIN — Medication 10 ML: at 02:04

## 2023-06-12 RX ADMIN — FUROSEMIDE 80 MG: 40 TABLET ORAL at 18:18

## 2023-06-12 RX ADMIN — GABAPENTIN 300 MG: 300 CAPSULE ORAL at 15:50

## 2023-06-12 RX ADMIN — GABAPENTIN 300 MG: 300 CAPSULE ORAL at 20:39

## 2023-06-12 RX ADMIN — BISACODYL 5 MG: 5 TABLET, COATED ORAL at 20:39

## 2023-06-12 RX ADMIN — OXYCODONE HYDROCHLORIDE 10 MG: 5 TABLET ORAL at 10:49

## 2023-06-12 RX ADMIN — OXYCODONE HYDROCHLORIDE 10 MG: 5 TABLET ORAL at 04:49

## 2023-06-12 RX ADMIN — HYDROMORPHONE HYDROCHLORIDE 1 MG: 1 INJECTION, SOLUTION INTRAMUSCULAR; INTRAVENOUS; SUBCUTANEOUS at 13:21

## 2023-06-12 RX ADMIN — OXYCODONE HYDROCHLORIDE 10 MG: 5 TABLET ORAL at 15:49

## 2023-06-12 RX ADMIN — LACTULOSE 30 G: 20 SOLUTION ORAL at 18:18

## 2023-06-12 RX ADMIN — Medication 10 ML: at 20:39

## 2023-06-12 RX ADMIN — PANTOPRAZOLE SODIUM 40 MG: 40 TABLET, DELAYED RELEASE ORAL at 04:49

## 2023-06-12 RX ADMIN — Medication 10 ML: at 07:54

## 2023-06-12 RX ADMIN — CEFTRIAXONE SODIUM 2 G: 2 INJECTION, SOLUTION INTRAVENOUS at 10:54

## 2023-06-12 RX ADMIN — LACTULOSE 30 G: 20 SOLUTION ORAL at 07:54

## 2023-06-12 RX ADMIN — HYDROMORPHONE HYDROCHLORIDE 1 MG: 1 INJECTION, SOLUTION INTRAMUSCULAR; INTRAVENOUS; SUBCUTANEOUS at 20:39

## 2023-06-12 RX ADMIN — OXYCODONE HYDROCHLORIDE 5 MG: 5 TABLET ORAL at 18:17

## 2023-06-12 RX ADMIN — OXYCODONE HYDROCHLORIDE 10 MG: 5 TABLET ORAL at 23:22

## 2023-06-12 RX ADMIN — SENNOSIDES AND DOCUSATE SODIUM 2 TABLET: 8.6; 5 TABLET ORAL at 20:39

## 2023-06-12 RX ADMIN — ONDANSETRON 4 MG: 2 INJECTION INTRAMUSCULAR; INTRAVENOUS at 15:47

## 2023-06-12 RX ADMIN — GABAPENTIN 300 MG: 300 CAPSULE ORAL at 07:52

## 2023-06-12 RX ADMIN — SENNOSIDES AND DOCUSATE SODIUM 2 TABLET: 8.6; 5 TABLET ORAL at 07:52

## 2023-06-12 RX ADMIN — HYDROMORPHONE HYDROCHLORIDE 1 MG: 1 INJECTION, SOLUTION INTRAMUSCULAR; INTRAVENOUS; SUBCUTANEOUS at 02:03

## 2023-06-12 RX ADMIN — LACTULOSE 30 G: 20 SOLUTION ORAL at 13:21

## 2023-06-12 NOTE — CONSULTS
"Nutrition Services    Patient Name: Lara Brock  YOB: 1975  MRN: [id number]  Admission date: 6/6/2023      CLINICAL NUTRITION ASSESSMENT      Reason for Assessment  Follow-up protocol   H&P:    Past Medical History:   Diagnosis Date   • Advanced hepatic cirrhosis    • History of drug use    • Infectious viral hepatitis    • Seizure    • Smoking greater than 30 pack years         Current Problems:   Active Hospital Problems    Diagnosis    • **Abdominal pain    • Moderate Malnutrition (HCC)    • Ascites         Nutrition/Diet History         Narrative     RD consult triggered for MST score 3 for unsure weight loss and decreased appetite. Pt presented to ED with abdominal pain x 4 days. PMH significant for advanced hepatic cirrhosis.     Nutrition follow up. Pt on cardiac diet, consuming % meals per nursing documentation. No new weights documented at this time. RD to continue with current nutrition interventions and monitor per protocol.       Anthropometrics        Current Height, Weight Height: 147.3 cm (58\")  Weight: 53.1 kg (117 lb 1 oz)   Current BMI Body mass index is 24.47 kg/m².       Weight Hx  Wt Readings from Last 30 Encounters:   06/06/23 0303 53.1 kg (117 lb 1 oz)   05/19/23 1038 53.5 kg (118 lb)   04/14/23 1937 57.7 kg (127 lb 3.3 oz)            Wt Change Observation -7.9% x 2 months, clinically significant     Estimated/Assessed Needs       Energy Requirements 25-30 kcal/kg   EST Needs (kcal/day) 6066-8324 kcal       Protein Requirements 0.8-1.0 g/kg   EST Daily Needs (g/day) 42-53 g       Fluid Requirements 1 ml/kcal    Estimated Needs (mL/day) 9449-4689 ml     Labs/Medications         Pertinent Labs Reviewed.   Results from last 7 days   Lab Units 06/12/23  1055 06/11/23  0437 06/10/23  0449   SODIUM mmol/L 130* 130* 131*   POTASSIUM mmol/L 4.0 4.0 4.2   CHLORIDE mmol/L 91* 94* 97*   CO2 mmol/L 32.1* 28.7 30.7*   BUN mg/dL 16 14 11   CREATININE mg/dL 0.78 0.83 0.85 "   CALCIUM mg/dL 8.6 8.6 8.6   BILIRUBIN mg/dL 1.4* 1.3* 1.0   ALK PHOS U/L 249* 236* 254*   ALT (SGPT) U/L 325* 341* 372*   AST (SGOT) U/L 217* 251* 294*   GLUCOSE mg/dL 174* 150* 124*     Results from last 7 days   Lab Units 06/12/23  0541 06/07/23  0529 06/06/23  0339   MAGNESIUM mg/dL  --   --  2.0   HEMOGLOBIN g/dL 13.7   < > 15.7   HEMATOCRIT % 39.8   < > 46.3    < > = values in this interval not displayed.     No results found for: COVID19  No results found for: HGBA1C      Pertinent Medications Reviewed.     Current Nutrition Orders & Evaluation of Intake       Oral Nutrition     Current PO Diet Diet: Cardiac Diets; Low Sodium (2g); Texture: Regular Texture (IDDSI 7); Fluid Consistency: Thin (IDDSI 0)   Supplement Orders Placed This Encounter      Snack: Cottage cheese and peaches       Malnutrition Severity Assessment      Patient meets criteria for : Moderate (non-severe) Malnutrition    Malnutrition Severity Assessment      Patient meets criteria for : Moderate (non-severe) Malnutrition            Nutrition Diagnosis         Nutrition Dx Problem 1 Moderate malnutrition related to decreased ability to consume sufficient energy as evidenced by decreased appetite., unintended wt change., body composition changes., and patient report.     Nutrition Intervention         Order cottage cheese and peaches as snack.     Medical Nutrition Therapy/Nutrition Education          Learner     Readiness Patient  Acceptance     Method     Response Explanation  Verbalizes understanding     Monitor/Evaluation        Monitor Per protocol, PO intake, Weight, POC/GOC     Nutrition Discharge Plan         To be determined     Electronically signed by:  Piedad Whitfield RD  06/12/23 13:02 EDT

## 2023-06-12 NOTE — PROGRESS NOTES
[facility]   Hospitalist Progress Note  Date: 2023  Patient Name: Lara Brock  : 1975  MRN: [id number]  Date of admission: 2023      Subjective   Subjective     Chief Complaint: Follow-up abdominal pain    Summary:Lara Brock is a 48 y.o. female past medical history of cirrhosis that presented to outside ER for evaluation of abdominal pain x4 days. Patient had paracentesis done at [facility] on  unclear the results of that procedure however states that since that procedure she had persistent abdominal pain mostly localized around the procedure site. She has also been having drainage from the procedure site as well. She denies any fevers, chills, sweats, chest pain or shortness of breath, palpitations, diarrhea constipation, dysuria, weakness, rash. She describes abdominal pain as sharp, radiates diffusely, constant, no known aggravating or alleviating factors. She has associated nausea and vomiting. She presented back to [facility] prior to transfer and there was concern for SBP due to her severe abdominal pain and guarding on exam. She received Rocephin and was transferred for further care.  Patient started on empiric antibiotics.  Lab work was essentially unremarkable.  CT of the abdomen demonstrated fluid within the abdominal wall approximating previous paracentesis.  Not enough ascites fluid for paracentesis per IR.  Suspect patient's abdominal pain due to leakage of ascites from peritoneal cavity into the subcutaneous tissues.    Continued to have issues with nausea vomiting abdominal pain.  LFTs found to be trending up.  Slowly trending back down.  Completed empiric course of Rocephin for SBP.  Diuretics increased. Patient should be able to go home once tolerating a diet abdominal pain controlled with oral analgesics.    Interval Followup: Patient sitting up at the bedside appears less uncomfortable today.  Patient states nausea and vomiting improving today..   Continues to endorse abdominal pain mostly on the right side from previous paracentesis.  Patient resolved afebrile overnight, sinus rhythm 100s to 130s on telemetry review.  Blood pressure within normal limits.  Satting well on room air. ALT AST trending down.  No other issues per nursing.    Review of Systems  Constitutional: Negative for fatigue and fever.   HENT: Negative for sore throat and trouble swallowing.    Eyes: Negative for pain and discharge.   Respiratory: Negative for cough and shortness of breath.    Cardiovascular: Negative for chest pain and palpitations.   Gastrointestinal: Positive for abdominal pain, nausea and vomiting.   Endocrine: Negative for cold intolerance and heat intolerance.   Genitourinary: Negative for difficulty urinating and dysuria.   Musculoskeletal: Negative for back pain and neck stiffness.   Skin: Negative for color change and rash.   Neurological: Negative for syncope and headaches.   Hematological: Negative for adenopathy.   Psychiatric/Behavioral: Negative for confusion and hallucinations.    Objective   Objective     Vitals:   Temp:  [97.6 °F (36.4 °C)-98.1 °F (36.7 °C)] 98.1 °F (36.7 °C)  Heart Rate:  [] 112  Resp:  [18-20] 19  BP: (127-147)/() 127/82  Physical Exam   Gen. well-developed appearing stated age in no acute distress  HEENT: Normocephalic atraumatic moist membranes pupils equal round reactive light, no scleral icterus no conjunctival injection  Cardiovascular: regular rate and rhythm no murmurs rubs or gallops S1-S2, no lower extremity edema appreciated  Pulmonary: Clear to auscultation bilaterally no wheezes rales or rhonchi symmetric chest expansion, unlabored, no conversational dyspnea appreciated  Gastrointestinal: Distended firm tender to palpation on the right lateral aspect positive bowel sounds all 4 quadrants no rebound or guarding  Musculoskeletal: No clubbing cyanosis, warm and well-perfused, calves soft symmetric nontender  bilaterally  Skin: Clean dry without rashs  Neuro: Cranial nerves II through XII intact grossly no sensorimotor deficits appreciated bilateral upper and lower extremities  Psych: Patient is calm cooperative and appropriate with exam not responding to internal stimuli  : No Arnold catheter no bladder distention no suprapubic tenderness    Result Review    Result Review:  I have personally reviewed these results and agree with these findings:  [x]  Laboratory  LAB RESULTS:      Lab 06/12/23  0541 06/10/23  0449 06/07/23  0529 06/06/23  1110 06/06/23  0819 06/06/23  0339   WBC 7.42 5.16 4.01  --   --  5.03   HEMOGLOBIN 13.7 14.5 13.2  --   --  15.7   HEMATOCRIT 39.8 42.8 38.9  --   --  46.3   PLATELETS 63* 55* 60*  --   --  66*   NEUTROS ABS  --   --   --   --   --  3.56   IMMATURE GRANS (ABS)  --   --   --   --   --  0.01   LYMPHS ABS  --   --   --   --   --  1.04   MONOS ABS  --   --   --   --   --  0.36   EOS ABS  --   --   --   --   --  0.05   MCV 92.1 91.3 91.1  --   --  91.9   SED RATE  --   --   --   --   --  4   CRP  --   --   --   --   --  0.32   PROCALCITONIN  --   --   --   --   --  0.13   LACTATE  --   --   --  1.9 2.2* 2.6*   PROTIME 16.0*  --   --   --   --  16.5*   APTT  --   --   --   --   --  29.0           Lab 06/12/23  1055 06/11/23  0437 06/10/23  0449 06/07/23  0529 06/06/23  0819 06/06/23  0339   SODIUM 130* 130* 131* 136  --  140   POTASSIUM 4.0 4.0 4.2 4.2 4.5 3.4*   CHLORIDE 91* 94* 97* 99  --  102   CO2 32.1* 28.7 30.7* 30.2*  --  28.3   ANION GAP 6.9 7.3 3.3* 6.8  --  9.7   BUN 16 14 11 8  --  10   CREATININE 0.78 0.83 0.85 0.75  --  0.88   EGFR 93.2 86.5 84.1 97.7  --  80.7   GLUCOSE 174* 150* 124* 147*  --  78   CALCIUM 8.6 8.6 8.6 8.2*  --  8.7   MAGNESIUM  --   --   --   --   --  2.0           Lab 06/12/23  1055 06/11/23  0437 06/10/23  0449 06/07/23  0529 06/06/23  0339   TOTAL PROTEIN 7.5 7.0 7.0 6.2 7.6   ALBUMIN 3.4* 3.2* 3.4* 3.0* 3.6   GLOBULIN 4.1 3.8 3.6 3.2 4.0   ALT (SGPT) 325*  341* 372* 192* 168*   AST (SGOT) 217* 251* 294* 167* 157*   BILIRUBIN 1.4* 1.3* 1.0 0.7 0.9   ALK PHOS 249* 236* 254* 250* 226*           Lab 06/12/23  0541 06/06/23 0339   PROTIME 16.0* 16.5*   INR 1.27* 1.33*                   Brief Urine Lab Results  (Last result in the past 365 days)        Color   Clarity   Blood   Leuk Est   Nitrite   Protein   CREAT   Urine HCG        04/15/23 1609 Dark Yellow   Cloudy   Trace   Large (3+)   Negative   Trace                 Microbiology Results (last 10 days)       Procedure Component Value - Date/Time    Blood Culture - Blood, Arm, Left [[id number]]  (Normal) Collected: 06/06/23 0339    Lab Status: Final result Specimen: Blood from Arm, Left Updated: 06/11/23 0400     Blood Culture No growth at 5 days    Blood Culture - Blood, Arm, Left [[id number]]  (Normal) Collected: 06/06/23 0339    Lab Status: Final result Specimen: Blood from Arm, Left Updated: 06/11/23 0400     Blood Culture No growth at 5 days            [x]  Microbiology  [x]  Radiology  CT Abdomen Pelvis With Contrast    Result Date: 6/11/2023   Cirrhosis, portal hypertension, splenomegaly, portosystemic venous shunting, and a large volume of ascites are identified.  Please see above comments for further detail.    Please note that portions of this note were completed with a voice recognition program.  CATHIE BROWNING JR, MD       Electronically Signed and Approved By: CATHIE BROWNING JR, MD on 6/11/2023 at 4:28              US Abdomen Limited    Result Date: 6/12/2023    Scant amount of ascitic fluid not amenable to drainage    KUSUM MOFFETT       Electronically Signed and Approved By: Shayan Archer M.D. on 6/12/2023 at 14:07             US Abdomen Limited    Result Date: 6/6/2023    1. Trace amounts of ascitic fluid.  The paracentesis was canceled at this time.     Shayan Archer M.D.       Electronically Signed and Approved By: Shayan Archer M.D. on 6/06/2023 at 15:13             CT chest abdomen pelvis with  contrast per contrast protocol    Result Date: 6/5/2023  1.   Minimal area of scar versus atelectasis in the lingula of the LEFT upper lobe. Remaining pulmonary parenchyma is clear. 2.  Microvascular cirrhosis and hepatomegaly. 3.  Persistent ascites. No organized fluid collection or abscess. 4.  No evidence of bowel obstruction abscess or free air. 5.  Interval development of subcutaneous emphysema along the RIGHT anterior abdominal wall, potentially from recent instrumentation. No organized abscess noted. No free air extends into the abdomen. 6.  No evidence of obstructive uropathy. Electronically Signed: Lencho Cee MD 2023/06/05 at 18:12 CDT Reading Location ID and State: [id number] / [place] Tel [phone], Service support  [phone], Fax [phone]     [x]  EKG/Telemetry   []  Cardiology/Vascular   []  Pathology  []  Old records  [x]  Other:  Scheduled Meds:furosemide, 80 mg, Oral, BID  gabapentin, 300 mg, Oral, TID  lactulose, 30 g, Oral, TID PC  pantoprazole, 40 mg, Oral, Q AM  senna-docusate sodium, 2 tablet, Oral, BID  sodium chloride, 10 mL, Intravenous, Q12H  spironolactone, 200 mg, Oral, Daily      Continuous Infusions:   PRN Meds:.  aluminum-magnesium hydroxide-simethicone    senna-docusate sodium **AND** polyethylene glycol **AND** bisacodyl **AND** bisacodyl    Calcium Replacement - Follow Nurse / BPA Driven Protocol    hydrALAZINE    HYDROmorphone    HYDROmorphone    hydrOXYzine pamoate    Magnesium Standard Dose Replacement - Follow Nurse / BPA Driven Protocol    metoprolol tartrate    nitroglycerin    ondansetron    oxyCODONE    oxyCODONE    Phosphorus Replacement - Follow Nurse / BPA Driven Protocol    Potassium Replacement - Follow Nurse / BPA Driven Protocol    sodium chloride    sodium chloride      Assessment & Plan   Assessment / Plan     Assessment/Plan:  Abdominal pain following paracentesis concern secondary to ascites infiltration into the abdominal wall versus SBP  Nausea  vomiting  Constipation  Hyperammonemia  Hepatic cirrhosis  Thrombocytopenia likely secondary to cirrhosis  Hepatic coagulopathy        Patient admitted for further evaluation and treatment  Plan on therapeutic and diagnostic paracentesis with interventional radiology  Continue p.o. analgesics, IV analgesics as needed breakthrough  Completed 7-day course of ceftriaxone for SBP  Continue as needed antiemetics  Advance diet as tolerated  Continue lactulose to 3 times daily and titrate to 2-3 bowel movements daily  Continue furosemide and spironolactone and titrate as blood pressure allows  Repeat CMP in a.m. monitor LFTs and electrolytes during diuresis  Continue home PPI  Further patient was recommendations pending clinical course.       Discussed plan with bedside RN.    Disposition: Home once abdominal pain controlled with oral analgesics and tolerating a diet.    DVT prophylaxis:  Mechanical DVT prophylaxis orders are present.    CODE STATUS:   Medical Intervention Limits: NO intubation (DNI)  Code Status (Patient has no pulse and is not breathing): No CPR (Do Not Attempt to Resuscitate)  Medical Interventions (Patient has pulse or is breathing): Limited Support

## 2023-06-13 LAB
ALBUMIN SERPL-MCNC: 3.4 G/DL (ref 3.5–5.2)
ALBUMIN/GLOB SERPL: 0.9 G/DL
ALP SERPL-CCNC: 250 U/L (ref 39–117)
ALT SERPL W P-5'-P-CCNC: 283 U/L (ref 1–33)
ANION GAP SERPL CALCULATED.3IONS-SCNC: 8.4 MMOL/L (ref 5–15)
AST SERPL-CCNC: 181 U/L (ref 1–32)
BILIRUB SERPL-MCNC: 1.1 MG/DL (ref 0–1.2)
BUN SERPL-MCNC: 17 MG/DL (ref 6–20)
BUN/CREAT SERPL: 22.4 (ref 7–25)
CALCIUM SPEC-SCNC: 8.5 MG/DL (ref 8.6–10.5)
CHLORIDE SERPL-SCNC: 91 MMOL/L (ref 98–107)
CO2 SERPL-SCNC: 29.6 MMOL/L (ref 22–29)
CREAT SERPL-MCNC: 0.76 MG/DL (ref 0.57–1)
EGFRCR SERPLBLD CKD-EPI 2021: 96.2 ML/MIN/1.73
GLOBULIN UR ELPH-MCNC: 3.8 GM/DL
GLUCOSE SERPL-MCNC: 143 MG/DL (ref 65–99)
POTASSIUM SERPL-SCNC: 3.7 MMOL/L (ref 3.5–5.2)
PROT SERPL-MCNC: 7.2 G/DL (ref 6–8.5)
SODIUM SERPL-SCNC: 129 MMOL/L (ref 136–145)
WHOLE BLOOD HOLD SPECIMEN: NORMAL

## 2023-06-13 PROCEDURE — 25010000002 ONDANSETRON PER 1 MG: Performed by: INTERNAL MEDICINE

## 2023-06-13 PROCEDURE — 96376 TX/PRO/DX INJ SAME DRUG ADON: CPT

## 2023-06-13 PROCEDURE — 80053 COMPREHEN METABOLIC PANEL: CPT | Performed by: FAMILY MEDICINE

## 2023-06-13 PROCEDURE — 99233 SBSQ HOSP IP/OBS HIGH 50: CPT | Performed by: INTERNAL MEDICINE

## 2023-06-13 PROCEDURE — G0378 HOSPITAL OBSERVATION PER HR: HCPCS

## 2023-06-13 PROCEDURE — 25010000002 HYDROMORPHONE 1 MG/ML SOLUTION: Performed by: FAMILY MEDICINE

## 2023-06-13 RX ORDER — FUROSEMIDE 40 MG/1
80 TABLET ORAL DAILY
Status: DISCONTINUED | OUTPATIENT
Start: 2023-06-14 | End: 2023-06-14 | Stop reason: HOSPADM

## 2023-06-13 RX ORDER — AMOXICILLIN 250 MG
1 CAPSULE ORAL NIGHTLY PRN
Status: DISCONTINUED | OUTPATIENT
Start: 2023-06-13 | End: 2023-06-14 | Stop reason: HOSPADM

## 2023-06-13 RX ORDER — POLYETHYLENE GLYCOL 3350 17 G/17G
17 POWDER, FOR SOLUTION ORAL DAILY PRN
Status: DISCONTINUED | OUTPATIENT
Start: 2023-06-13 | End: 2023-06-14 | Stop reason: HOSPADM

## 2023-06-13 RX ORDER — BISACODYL 5 MG/1
5 TABLET, DELAYED RELEASE ORAL DAILY PRN
Status: DISCONTINUED | OUTPATIENT
Start: 2023-06-13 | End: 2023-06-14 | Stop reason: HOSPADM

## 2023-06-13 RX ORDER — OXYCODONE HYDROCHLORIDE 5 MG/1
5 TABLET ORAL EVERY 6 HOURS PRN
Status: DISCONTINUED | OUTPATIENT
Start: 2023-06-13 | End: 2023-06-14 | Stop reason: HOSPADM

## 2023-06-13 RX ORDER — OXYCODONE HYDROCHLORIDE 5 MG/1
10 TABLET ORAL EVERY 6 HOURS PRN
Status: DISCONTINUED | OUTPATIENT
Start: 2023-06-13 | End: 2023-06-14 | Stop reason: HOSPADM

## 2023-06-13 RX ORDER — SPIRONOLACTONE 25 MG/1
100 TABLET ORAL DAILY
Status: DISCONTINUED | OUTPATIENT
Start: 2023-06-14 | End: 2023-06-14 | Stop reason: HOSPADM

## 2023-06-13 RX ORDER — SIMETHICONE 80 MG
80 TABLET,CHEWABLE ORAL 3 TIMES DAILY
Status: DISCONTINUED | OUTPATIENT
Start: 2023-06-13 | End: 2023-06-14 | Stop reason: HOSPADM

## 2023-06-13 RX ORDER — BISACODYL 10 MG
10 SUPPOSITORY, RECTAL RECTAL DAILY PRN
Status: DISCONTINUED | OUTPATIENT
Start: 2023-06-13 | End: 2023-06-14 | Stop reason: HOSPADM

## 2023-06-13 RX ADMIN — LACTULOSE 30 G: 20 SOLUTION ORAL at 08:32

## 2023-06-13 RX ADMIN — GABAPENTIN 300 MG: 300 CAPSULE ORAL at 20:14

## 2023-06-13 RX ADMIN — FUROSEMIDE 80 MG: 40 TABLET ORAL at 08:32

## 2023-06-13 RX ADMIN — Medication 10 ML: at 08:33

## 2023-06-13 RX ADMIN — OXYCODONE HYDROCHLORIDE 5 MG: 5 TABLET ORAL at 10:06

## 2023-06-13 RX ADMIN — ONDANSETRON 4 MG: 2 INJECTION INTRAMUSCULAR; INTRAVENOUS at 13:19

## 2023-06-13 RX ADMIN — SPIRONOLACTONE 200 MG: 100 TABLET ORAL at 08:32

## 2023-06-13 RX ADMIN — HYDROMORPHONE HYDROCHLORIDE 0.5 MG: 1 INJECTION, SOLUTION INTRAMUSCULAR; INTRAVENOUS; SUBCUTANEOUS at 07:08

## 2023-06-13 RX ADMIN — OXYCODONE HYDROCHLORIDE 10 MG: 5 TABLET ORAL at 20:14

## 2023-06-13 RX ADMIN — SIMETHICONE 80 MG: 80 TABLET, CHEWABLE ORAL at 20:14

## 2023-06-13 RX ADMIN — GABAPENTIN 300 MG: 300 CAPSULE ORAL at 17:03

## 2023-06-13 RX ADMIN — GABAPENTIN 300 MG: 300 CAPSULE ORAL at 08:32

## 2023-06-13 RX ADMIN — OXYCODONE HYDROCHLORIDE 10 MG: 5 TABLET ORAL at 04:38

## 2023-06-13 RX ADMIN — OXYCODONE HYDROCHLORIDE 10 MG: 5 TABLET ORAL at 14:07

## 2023-06-13 RX ADMIN — PANTOPRAZOLE SODIUM 40 MG: 40 TABLET, DELAYED RELEASE ORAL at 04:38

## 2023-06-13 RX ADMIN — HYDROMORPHONE HYDROCHLORIDE 1 MG: 1 INJECTION, SOLUTION INTRAMUSCULAR; INTRAVENOUS; SUBCUTANEOUS at 02:32

## 2023-06-13 RX ADMIN — BISACODYL 5 MG: 5 TABLET, COATED ORAL at 14:08

## 2023-06-13 RX ADMIN — LACTULOSE 30 G: 20 SOLUTION ORAL at 12:18

## 2023-06-13 RX ADMIN — Medication 10 ML: at 20:15

## 2023-06-13 NOTE — PLAN OF CARE
Goal Outcome Evaluation:   Patient is A&O X4. Sinus Tach on the monitor. Medicated for pain several times this shift per eMAR. Medicated for Nausea X1.

## 2023-06-13 NOTE — PLAN OF CARE
Goal Outcome Evaluation:  Plan of Care Reviewed With: patient        Progress: no change  Outcome Evaluation: continue to require frequent pain meds for abdominal pain. contiue plan of care.

## 2023-06-13 NOTE — PROGRESS NOTES
[facility]   Hospitalist Progress Note  Date: 2023  Patient Name: Lara Brock  : 1975  MRN: [id number]  Date of admission: 2023      Subjective   Subjective     Chief Complaint: Follow up for abdominal pain    Summary: 48-year-old female with chronic B and C hepatitis, cirrhosis with portal hypertension and portosystemic venous shunting who presented to outside hospital with nausea/vomiting and diffuse abdominal pain.  Concern for SBP, received Rocephin and transferred to our facility.  Initial labs WBC 5, hemoglobin 15.7, platelets 66.  INR 1.33.  Lactate 2.6.  Ammonia 62.  /.  Total bilirubin normal.  Alkaline phosphatase 226.  Creatinine 0.88.  Sodium 140 CT abdomen pelvis with contrast reported Cirrhosis, portal hypertension, splenomegaly, portosystemic venous shunting, possible portal hypertensive gastropathy, enteropathy, and colopathy, ascites (large in volume with CT numbers ranging between 3 and 12 Hounsfield units).  However IR could not find enough fluid to drain.  Diuretic regimen increased.  Completed empiric course of Rocephin    Interval Followup:   Afebrile.  Persistent tachycardic.   Tolerating oral intake.  Minimal nausea; controlled with antiemetic  Reporting severe, diffuse abdominal pain  + Bowel movement this morning    Review of Systems  Cardiovascular:  No Chest Pain, No Edema  Respiratory:  No Cough, No Dyspnea  Gastrointestinal:  +Nausea, No Vomiting      Objective   Objective     Vitals:   Temp:  [97.4 °F (36.3 °C)-98.8 °F (37.1 °C)] 98.8 °F (37.1 °C)  Heart Rate:  [100-121] 117  Resp:  [16-19] 18  BP: (127-147)/(82-98) 130/86  Physical Exam    Constitutional: Thin female, conversant, appears uncomfortable   Eyes: Pupils equal and reactive, no conjunctival injections   HENT: NCAT, nares patent, MMM   Respiratory: Clear to auscultation bilaterally, nonlabored respirations    Cardiovascular: RRR, no murmurs, no edema   Gastrointestinal:  Distended, tense, tenderness throughout, bowel sounds appreciated reducible umbilical hernia   Neurologic: Alert, Cranial Nerves grossly intact, speech clear   Skin: Extremities warm, no rashes    Result Review    Result Review:  I have personally reviewed the following over the last 24 hours (07:00 to 07:00) and agree with the following findings  [x]  Laboratory  CBC        6/7/2023    05:29 6/10/2023    04:49 6/12/2023    05:41   CBC   WBC 4.01  5.16  7.42    RBC 4.27  4.69  4.32    Hemoglobin 13.2  14.5  13.7    Hematocrit 38.9  42.8  39.8    MCV 91.1  91.3  92.1    MCH 30.9  30.9  31.7    MCHC 33.9  33.9  34.4    RDW 14.3  14.5  14.3    Platelets 60  55  63       BMP        6/11/2023    04:37 6/12/2023    10:55 6/13/2023    04:37   BMP   BUN 14  16  17    Creatinine 0.83  0.78  0.76    Sodium 130  130  129    Potassium 4.0  4.0  3.7    Chloride 94  91  91    CO2 28.7  32.1  29.6    Calcium 8.6  8.6  8.5      [x]  Microbiology  Blood cultures NGTD  [x]  Radiology   [x]  EKG/Telemetry monitor reviewed: Sinus tachycardia  []  Cardiology/Vascular   []  Pathology  []  Old records  [x]  Other:    Intake/Output Summary (Last 24 hours) at 6/13/2023 1642  Last data filed at 6/13/2023 1311  Gross per 24 hour   Intake 1080 ml   Output --   Net 1080 ml         Assessment & Plan   Assessment / Plan     Assessment/Plan:  • Decompensated cirrhosis with ascites  • Chronic hepatitis B and C   • Transaminitis  • Diffuse generalized abdominal pain  • Chronic thrombocytopenia 2/2 cirrhosis  • Hyperammonemia   • Nausea/vomiting, improved  • Sinus tachycardia  • History of narcotic abuse     · Abdomen is still very distended and endorsing a lot of pain.  Will check with IR tomorrow about reattempting paracentesis.  · Trial of simethicone  · Stop IV narcotics.  Adjust frequency of oxycodone IR to every 6 hours as needed  · Continue PPI  · Continue antiemetics as needed  · Low-sodium diet as tolerated  · No issues with constipation.   Continue scheduled lactulose TID  · Creatinine/electrolytes stable. Reduce Lasix to 80 mg daily and spironolactone 100 mg daily. Monitor kidney function and electrolytes daily  · Check magnesium level, replete if low   · ALT/ALT improving.  Monitor daily CMP   · N.p.o. after midnight    Discussed plan with RN.    DVT prophylaxis:  Mechanical DVT prophylaxis orders are present.    CODE STATUS:   Medical Intervention Limits: NO intubation (DNI)  Code Status (Patient has no pulse and is not breathing): No CPR (Do Not Attempt to Resuscitate)  Medical Interventions (Patient has pulse or is breathing): Limited Support      Electronically signed by Jeet Campbell DO, 06/13/23, 4:31 PM EDT.

## 2023-06-14 ENCOUNTER — APPOINTMENT (OUTPATIENT)
Dept: INTERVENTIONAL RADIOLOGY/VASCULAR | Facility: HOSPITAL | Age: 48
End: 2023-06-14
Payer: MEDICAID

## 2023-06-14 ENCOUNTER — READMISSION MANAGEMENT (OUTPATIENT)
Dept: CALL CENTER | Facility: HOSPITAL | Age: 48
End: 2023-06-14
Payer: MEDICAID

## 2023-06-14 VITALS
HEART RATE: 103 BPM | WEIGHT: 117.06 LBS | HEIGHT: 58 IN | SYSTOLIC BLOOD PRESSURE: 107 MMHG | BODY MASS INDEX: 24.57 KG/M2 | TEMPERATURE: 98.4 F | OXYGEN SATURATION: 100 % | DIASTOLIC BLOOD PRESSURE: 67 MMHG | RESPIRATION RATE: 18 BRPM

## 2023-06-14 LAB
ALBUMIN SERPL-MCNC: 3.3 G/DL (ref 3.5–5.2)
ALBUMIN/GLOB SERPL: 0.9 G/DL
ALP SERPL-CCNC: 243 U/L (ref 39–117)
ALT SERPL W P-5'-P-CCNC: 235 U/L (ref 1–33)
ANION GAP SERPL CALCULATED.3IONS-SCNC: 7 MMOL/L (ref 5–15)
AST SERPL-CCNC: 138 U/L (ref 1–32)
BILIRUB SERPL-MCNC: 1.1 MG/DL (ref 0–1.2)
BUN SERPL-MCNC: 19 MG/DL (ref 6–20)
BUN/CREAT SERPL: 24.4 (ref 7–25)
CALCIUM SPEC-SCNC: 8.9 MG/DL (ref 8.6–10.5)
CHLORIDE SERPL-SCNC: 91 MMOL/L (ref 98–107)
CO2 SERPL-SCNC: 31 MMOL/L (ref 22–29)
CREAT SERPL-MCNC: 0.78 MG/DL (ref 0.57–1)
DEPRECATED RDW RBC AUTO: 48.1 FL (ref 37–54)
EGFRCR SERPLBLD CKD-EPI 2021: 93.2 ML/MIN/1.73
ERYTHROCYTE [DISTWIDTH] IN BLOOD BY AUTOMATED COUNT: 14.7 % (ref 12.3–15.4)
GLOBULIN UR ELPH-MCNC: 3.8 GM/DL
GLUCOSE SERPL-MCNC: 123 MG/DL (ref 65–99)
HCT VFR BLD AUTO: 38.2 % (ref 34–46.6)
HGB BLD-MCNC: 13.1 G/DL (ref 12–15.9)
INR PPP: 1.4 (ref 0.86–1.15)
MAGNESIUM SERPL-MCNC: 2.1 MG/DL (ref 1.6–2.6)
MCH RBC QN AUTO: 31 PG (ref 26.6–33)
MCHC RBC AUTO-ENTMCNC: 34.3 G/DL (ref 31.5–35.7)
MCV RBC AUTO: 90.3 FL (ref 79–97)
PLATELET # BLD AUTO: 80 10*3/MM3 (ref 140–450)
PMV BLD AUTO: 11.2 FL (ref 6–12)
POTASSIUM SERPL-SCNC: 4.1 MMOL/L (ref 3.5–5.2)
PROT SERPL-MCNC: 7.1 G/DL (ref 6–8.5)
PROTHROMBIN TIME: 17.1 SECONDS (ref 11.8–14.9)
RBC # BLD AUTO: 4.23 10*6/MM3 (ref 3.77–5.28)
SODIUM SERPL-SCNC: 129 MMOL/L (ref 136–145)
WBC NRBC COR # BLD: 6.87 10*3/MM3 (ref 3.4–10.8)

## 2023-06-14 PROCEDURE — 99239 HOSP IP/OBS DSCHRG MGMT >30: CPT | Performed by: INTERNAL MEDICINE

## 2023-06-14 PROCEDURE — 83735 ASSAY OF MAGNESIUM: CPT | Performed by: INTERNAL MEDICINE

## 2023-06-14 PROCEDURE — 85610 PROTHROMBIN TIME: CPT | Performed by: INTERNAL MEDICINE

## 2023-06-14 PROCEDURE — G0378 HOSPITAL OBSERVATION PER HR: HCPCS

## 2023-06-14 PROCEDURE — 80053 COMPREHEN METABOLIC PANEL: CPT | Performed by: INTERNAL MEDICINE

## 2023-06-14 PROCEDURE — 85027 COMPLETE CBC AUTOMATED: CPT | Performed by: INTERNAL MEDICINE

## 2023-06-14 RX ADMIN — LACTULOSE 30 G: 20 SOLUTION ORAL at 08:45

## 2023-06-14 RX ADMIN — FUROSEMIDE 80 MG: 40 TABLET ORAL at 08:45

## 2023-06-14 RX ADMIN — SPIRONOLACTONE 100 MG: 25 TABLET ORAL at 08:45

## 2023-06-14 RX ADMIN — OXYCODONE HYDROCHLORIDE 10 MG: 5 TABLET ORAL at 02:16

## 2023-06-14 RX ADMIN — SIMETHICONE 80 MG: 80 TABLET, CHEWABLE ORAL at 08:45

## 2023-06-14 RX ADMIN — GABAPENTIN 300 MG: 300 CAPSULE ORAL at 08:45

## 2023-06-14 RX ADMIN — OXYCODONE HYDROCHLORIDE 10 MG: 5 TABLET ORAL at 09:02

## 2023-06-14 RX ADMIN — Medication 10 ML: at 08:48

## 2023-06-14 NOTE — PLAN OF CARE
Goal Outcome Evaluation:                      Patient A&Ox4. Patient complained of pain and  was medicated per MAR. Discharge paperwork discussed with patient, patient verbalized understanding. IV removed, tip intact.

## 2023-06-14 NOTE — OUTREACH NOTE
Prep Survey      Flowsheet Row Responses   [facility] facility patient discharged from? [facility]   Is LACE score < 7 ? Yes   Eligibility Not Eligible   What are the reasons patient is not eligible? Other  [Low risk for readmission]   Does the patient have one of the following disease processes/diagnoses(primary or secondary)? Other   Prep survey completed? Yes            KIKO AGUDELO - Registered Nurse

## 2023-06-14 NOTE — DISCHARGE SUMMARY
[facility]         HOSPITALIST  DISCHARGE SUMMARY    Patient Name: Lara Brock  : 1975  MRN: [id number]    Date of Admission: 2023  Date of Discharge:  23  Primary Care Physician: Soy Garcia MD    Consults     No orders found from 2023 to 2023.          Active and Resolved Hospital Problems:  • Decompensated cirrhosis with ascites  • Chronic hepatitis B and C   • Transaminitis  • Diffuse generalized abdominal pain  • Chronic thrombocytopenia 2/2 cirrhosis  • Hyponatremia, chronic secondary to cirrhosis, clinically insignificant  • Hyperammonemia   • Nausea/vomiting, improved  • Sinus tachycardia  • History of narcotic abuse  · Moderate malnutrition    Hospital Course     Hospital Course:  Lara Brock is a 48 y.o. female with chronic B and C hepatitis, cirrhosis with portal hypertension and portosystemic venous shunting who presented to outside hospital with nausea/vomiting and diffuse abdominal pain.  Concern for SBP, received Rocephin and transferred to our facility.  Initial labs WBC 5, hemoglobin 15.7, platelets 66.  INR 1.33.  Lactate 2.6.  Ammonia 62.  /.  Total bilirubin normal.  Alkaline phosphatase 226.  Creatinine 0.88.  Sodium 140 CT abdomen pelvis with contrast reported Cirrhosis, portal hypertension, splenomegaly, portosystemic venous shunting, possible portal hypertensive gastropathy, enteropathy, and colopathy, ascites (large in volume with CT numbers ranging between 3 and 12 Hounsfield units).  Evaluated by IR for paracentesis but not enough fluid amenable to drainage.  Diuretic regimen increased.  Creatinine remained within normal limits.  Sodium mildly low but not far from baseline. liver enzymes improved.  Blood cultures no growth.  Remained afebrile without leukocytosis.  Completed course of Rocephin.  Abdominal pain improved.  No constipation.  Offered reattempt for paracentesis but patient declined and  wanted to be discharged home. Tolerated oral intake and ambulate independently.  Discussed close follow-up with primary care provider and gastroenterologist.  She is aware that she can notify gastroenterologist in order to get outpatient paracentesis if distention worsens.  Of note she has been referred to transplant liver service in [place] for evaluation and I emphasized need to establish care.  Medication changes, follow-up instructions provided.  Discharged home stable condition    Day of Discharge     Vital Signs:  Temp:  [98.1 °F (36.7 °C)-98.8 °F (37.1 °C)] 98.4 °F (36.9 °C)  Heart Rate:  [102-113] 103  Resp:  [18] 18  BP: (107-143)/(66-84) 107/67  Physical Exam:   GENERAL: The patient is thin, conversant and nontoxic.  HEENT: PERRLA, Oropharynx clear. Moist mucous membranes.   HEART: Regular rate and rhythm. No edema  LUNGS: Equal air entry, clear to auscultation bilaterally.  ABDOMEN: Distended, bowel sounds appreciated, no rigidity or guarding  SKIN: No rash  NEUROLOGIC: Alert, cranial nerves grossly intact, speech clear    Discharge Details        Discharge Medications      Continue These Medications      Instructions Start Date   furosemide 40 MG tablet  Commonly known as: LASIX   40 mg, Oral, Daily      gabapentin 300 MG capsule  Commonly known as: NEURONTIN   300 mg, Oral, 3 Times Daily      lactulose 10 GM/15ML solution  Commonly known as: CHRONULAC   20 g, Oral, Daily      omeprazole 20 MG capsule  Commonly known as: priLOSEC   20 mg, Oral, Daily      spironolactone 100 MG tablet  Commonly known as: ALDACTONE   100 mg, Oral, Daily             Allergies   Allergen Reactions   • Codeine Nausea Only       Discharge Disposition:  Home or Self Care    Diet:  Hospital:No active diet order      Discharge Activity:   Activity Instructions     Activity as Tolerated            CODE STATUS:  Code Status and Medical Interventions:   Ordered at: 06/06/23 0304     Medical Intervention Limits:    NO intubation  (DNI)     Code Status (Patient has no pulse and is not breathing):    No CPR (Do Not Attempt to Resuscitate)     Medical Interventions (Patient has pulse or is breathing):    Limited Support         Future Appointments   Date Time Provider Department Center   7/31/2023 11:15 AM Nancy Cruz APRN [facility]       Additional Instructions for the Follow-ups that You Need to Schedule     Discharge Follow-up with PCP   As directed       Currently Documented PCP:    Soy Garcia MD    PCP Phone Number:    [phone]     Follow Up Details: 1 week         Discharge Follow-up with Specialty: Gastroenterology. Piedad Guillaume APRN   As directed      Specialty: Gastroenterology. Piedad Guillaume APRN    Follow Up Details: notify office in order to get outpatient paracentesis scheduled               Pertinent  and/or Most Recent Results     PROCEDURES: NONE    LAB RESULTS:      Lab 06/14/23 0453 06/12/23  0541 06/10/23  0449   WBC 6.87 7.42 5.16   HEMOGLOBIN 13.1 13.7 14.5   HEMATOCRIT 38.2 39.8 42.8   PLATELETS 80* 63* 55*   MCV 90.3 92.1 91.3   PROTIME 17.1* 16.0*  --          Lab 06/14/23  0453 06/13/23  0437 06/12/23  1055 06/11/23  0437 06/10/23  0449   SODIUM 129* 129* 130* 130* 131*   POTASSIUM 4.1 3.7 4.0 4.0 4.2   CHLORIDE 91* 91* 91* 94* 97*   CO2 31.0* 29.6* 32.1* 28.7 30.7*   ANION GAP 7.0 8.4 6.9 7.3 3.3*   BUN 19 17 16 14 11   CREATININE 0.78 0.76 0.78 0.83 0.85   EGFR 93.2 96.2 93.2 86.5 84.1   GLUCOSE 123* 143* 174* 150* 124*   CALCIUM 8.9 8.5* 8.6 8.6 8.6   MAGNESIUM 2.1  --   --   --   --          Lab 06/14/23 0453 06/13/23 0437 06/12/23  1055 06/11/23 0437 06/10/23  0449   TOTAL PROTEIN 7.1 7.2 7.5 7.0 7.0   ALBUMIN 3.3* 3.4* 3.4* 3.2* 3.4*   GLOBULIN 3.8 3.8 4.1 3.8 3.6   ALT (SGPT) 235* 283* 325* 341* 372*   AST (SGOT) 138* 181* 217* 251* 294*   BILIRUBIN 1.1 1.1 1.4* 1.3* 1.0   ALK PHOS 243* 250* 249* 236* 254*         Lab 06/14/23  0453 06/12/23  0541   PROTIME  17.1* 16.0*   INR 1.40* 1.27*                 Brief Urine Lab Results  (Last result in the past 365 days)      Color   Clarity   Blood   Leuk Est   Nitrite   Protein   CREAT   Urine HCG        04/15/23 1609 Dark Yellow   Cloudy   Trace   Large (3+)   Negative   Trace               Microbiology Results (last 10 days)     Procedure Component Value - Date/Time    Blood Culture - Blood, Arm, Left [[id number]]  (Normal) Collected: 06/06/23 0339    Lab Status: Final result Specimen: Blood from Arm, Left Updated: 06/11/23 0400     Blood Culture No growth at 5 days    Blood Culture - Blood, Arm, Left [[id number]]  (Normal) Collected: 06/06/23 0339    Lab Status: Final result Specimen: Blood from Arm, Left Updated: 06/11/23 0400     Blood Culture No growth at 5 days          CT Abdomen Pelvis With Contrast    Result Date: 6/11/2023  Impression:  Cirrhosis, portal hypertension, splenomegaly, portosystemic venous shunting, and a large volume of ascites are identified.  Please see above comments for further detail.    Please note that portions of this note were completed with a voice recognition program.  CATHIE BROWNING JR, MD       Electronically Signed and Approved By: CATHIE BROWNING JR, MD on 6/11/2023 at 4:28              XR Chest 1 View    Result Date: 6/1/2023  Impression: No interval change. Normal chest. Electronically Signed: Ignacio Carlson MD 2023/06/01 at 12:10 CDT [place] Location ID and State: [address] / [place] Tel [phone], Service support  [phone], Fax [phone]    XR Chest 1 View    Result Date: 5/23/2023  Impression: No acute process. Electronically Signed: Brice Del Rio MD 2023/05/23 at 12:16 CDT [place] Location ID and State: [address] / [place] Tel [phone], Service support  [phone], Fax [phone]    US Abdomen Limited    Result Date: 6/12/2023  Impression:   Scant amount of ascitic fluid not amenable to drainage    KUSUM MOFFETT       Electronically Signed and Approved By: Shayan Archer M.D. on  6/12/2023 at 14:07             US Abdomen Limited    Result Date: 6/6/2023  Impression:   1. Trace amounts of ascitic fluid.  The paracentesis was canceled at this time.     Shayan Archer M.D.       Electronically Signed and Approved By: Shayan Archer M.D. on 6/06/2023 at 15:13             US Paracentesis    Result Date: 5/19/2023  Impression:  Ultrasound-guided paracentesis was performed without complication, patient tolerated procedure with 6 L of clear, yellow ascitic fluid removed. A sample specimen was sent to the lab for further diagnostic evaluation.  The patient was instructed to obtain follow up care from the referring physician.     KUSUM MOFFETT       Electronically Signed and Approved By: DAVIDA SANCHEZ MD on 5/19/2023 at 16:41             CT chest abdomen pelvis with contrast per contrast protocol    Result Date: 6/5/2023  Impression: 1.   Minimal area of scar versus atelectasis in the lingula of the LEFT upper lobe. Remaining pulmonary parenchyma is clear. 2.  Microvascular cirrhosis and hepatomegaly. 3.  Persistent ascites. No organized fluid collection or abscess. 4.  No evidence of bowel obstruction abscess or free air. 5.  Interval development of subcutaneous emphysema along the RIGHT anterior abdominal wall, potentially from recent instrumentation. No organized abscess noted. No free air extends into the abdomen. 6.  No evidence of obstructive uropathy. Electronically Signed: Lencho Cee MD 2023/06/05 at 18:12 CDT [place] Location ID and State: [facility] Tel [phone], Service support  [phone], Fax [phone]    CT chest abdomen pelvis with contrast per contrast protocol    Result Date: 5/23/2023  Impression: 1.  Chest: No acute process. Mild fibrotic scarring in the lingula of the left upper lobe. 2.  Abdomen and pelvis: Significant amount of abdominal and pelvic ascites, moderate cirrhosis, mild splenomegaly. Electronically Signed: Brice Del Rio MD 2023/05/23 at 13:57 CDT  [place] Location ID and State: [address] / [place] Tel [phone], Service support  [phone], Fax [phone]      Results for orders placed during the hospital encounter of 10/26/21    Duplex venous lower extremity left CAR    Interpretation Summary  · Normal left lower extremity venous duplex scan.      Results for orders placed during the hospital encounter of 10/26/21    Duplex venous lower extremity left CAR    Interpretation Summary  · Normal left lower extremity venous duplex scan.          Labs Pending at Discharge:    Time spent on Discharge including face to face service: >30 minutes    Electronically signed by Jeet Campbell DO, 06/14/23, 4:03 PM EDT.

## 2023-06-28 ENCOUNTER — TELEPHONE (OUTPATIENT)
Dept: GASTROENTEROLOGY | Facility: CLINIC | Age: 48
End: 2023-06-28

## 2023-06-28 NOTE — TELEPHONE ENCOUNTER
Caller: [facility]    Relationship: Provider    Best call back number: [phone]    What form or medical record are you requesting: OFFICE VISIT WITH DR. MEANS 5/19/23.    Who is requesting this form or medical record from you: [facility]    How would you like to receive the form or medical records (pick-up, mail, fax): FAX  If fax, what is the fax number: [phone]    Timeframe paperwork needed: ASAP    Additional notes: PATIENT IS BEING SEEN IN THE [facility] OFFICE AND THE MD WOULD LIKE RECORDS FROM DR. MEANS'S OFFICE VISIT 5/19/2023.

## 2023-07-01 PROBLEM — R18.8 ASCITIC FLUID: Status: ACTIVE | Noted: 2023-07-01

## 2023-07-18 ENCOUNTER — HOSPITAL ENCOUNTER (OUTPATIENT)
Facility: HOSPITAL | Age: 48
Setting detail: OBSERVATION
Discharge: HOME | End: 2023-07-19
Attending: INTERNAL MEDICINE | Admitting: INTERNAL MEDICINE
Payer: MEDICAID

## 2023-07-18 DIAGNOSIS — R10.12 LEFT UPPER QUADRANT ABDOMINAL PAIN: Primary | ICD-10-CM

## 2023-07-18 PROBLEM — R11.2 NAUSEA AND VOMITING: Status: ACTIVE | Noted: 2023-07-18

## 2023-07-18 PROCEDURE — G0379 DIRECT REFER HOSPITAL OBSERV: HCPCS

## 2023-07-18 PROCEDURE — G0378 HOSPITAL OBSERVATION PER HR: HCPCS

## 2023-07-18 PROCEDURE — 25010000002 CEFTRIAXONE PER 250 MG: Performed by: INTERNAL MEDICINE

## 2023-07-18 PROCEDURE — 25010000002 ENOXAPARIN PER 10 MG: Performed by: INTERNAL MEDICINE

## 2023-07-18 PROCEDURE — 96365 THER/PROPH/DIAG IV INF INIT: CPT

## 2023-07-18 PROCEDURE — 87040 BLOOD CULTURE FOR BACTERIA: CPT | Performed by: INTERNAL MEDICINE

## 2023-07-18 RX ORDER — SODIUM CHLORIDE 9 MG/ML
40 INJECTION, SOLUTION INTRAVENOUS AS NEEDED
Status: DISCONTINUED | OUTPATIENT
Start: 2023-07-18 | End: 2023-07-19 | Stop reason: HOSPADM

## 2023-07-18 RX ORDER — BISACODYL 10 MG
10 SUPPOSITORY, RECTAL RECTAL DAILY PRN
Status: DISCONTINUED | OUTPATIENT
Start: 2023-07-18 | End: 2023-07-19 | Stop reason: HOSPADM

## 2023-07-18 RX ORDER — SODIUM CHLORIDE 0.9 % (FLUSH) 0.9 %
10 SYRINGE (ML) INJECTION AS NEEDED
Status: DISCONTINUED | OUTPATIENT
Start: 2023-07-18 | End: 2023-07-19 | Stop reason: HOSPADM

## 2023-07-18 RX ORDER — ONDANSETRON 2 MG/ML
4 INJECTION INTRAMUSCULAR; INTRAVENOUS EVERY 6 HOURS PRN
Status: DISCONTINUED | OUTPATIENT
Start: 2023-07-18 | End: 2023-07-19 | Stop reason: HOSPADM

## 2023-07-18 RX ORDER — AMOXICILLIN 250 MG
2 CAPSULE ORAL 2 TIMES DAILY
Status: DISCONTINUED | OUTPATIENT
Start: 2023-07-18 | End: 2023-07-19 | Stop reason: HOSPADM

## 2023-07-18 RX ORDER — SODIUM CHLORIDE 0.9 % (FLUSH) 0.9 %
10 SYRINGE (ML) INJECTION EVERY 12 HOURS SCHEDULED
Status: DISCONTINUED | OUTPATIENT
Start: 2023-07-18 | End: 2023-07-19 | Stop reason: HOSPADM

## 2023-07-18 RX ORDER — PROCHLORPERAZINE EDISYLATE 5 MG/ML
5 INJECTION INTRAMUSCULAR; INTRAVENOUS EVERY 6 HOURS PRN
Status: DISCONTINUED | OUTPATIENT
Start: 2023-07-18 | End: 2023-07-19 | Stop reason: HOSPADM

## 2023-07-18 RX ORDER — POLYETHYLENE GLYCOL 3350 17 G/17G
17 POWDER, FOR SOLUTION ORAL DAILY PRN
Status: DISCONTINUED | OUTPATIENT
Start: 2023-07-18 | End: 2023-07-19 | Stop reason: HOSPADM

## 2023-07-18 RX ORDER — BISACODYL 5 MG/1
5 TABLET, DELAYED RELEASE ORAL DAILY PRN
Status: DISCONTINUED | OUTPATIENT
Start: 2023-07-18 | End: 2023-07-19 | Stop reason: HOSPADM

## 2023-07-18 RX ORDER — ACETAMINOPHEN 325 MG/1
650 TABLET ORAL EVERY 6 HOURS PRN
Status: DISCONTINUED | OUTPATIENT
Start: 2023-07-18 | End: 2023-07-19 | Stop reason: HOSPADM

## 2023-07-18 RX ORDER — ENOXAPARIN SODIUM 100 MG/ML
40 INJECTION SUBCUTANEOUS EVERY 24 HOURS
Status: DISCONTINUED | OUTPATIENT
Start: 2023-07-18 | End: 2023-07-19 | Stop reason: HOSPADM

## 2023-07-18 RX ORDER — TRAMADOL HYDROCHLORIDE 50 MG/1
25 TABLET ORAL EVERY 6 HOURS PRN
Status: DISCONTINUED | OUTPATIENT
Start: 2023-07-18 | End: 2023-07-19 | Stop reason: HOSPADM

## 2023-07-18 RX ADMIN — Medication 10 ML: at 21:55

## 2023-07-18 RX ADMIN — CEFTRIAXONE SODIUM 2000 MG: 2 INJECTION, POWDER, FOR SOLUTION INTRAMUSCULAR; INTRAVENOUS at 22:00

## 2023-07-18 RX ADMIN — ACETAMINOPHEN 650 MG: 325 TABLET ORAL at 21:55

## 2023-07-18 RX ADMIN — TRAMADOL HYDROCHLORIDE 25 MG: 50 TABLET ORAL at 23:36

## 2023-07-18 NOTE — PLAN OF CARE
Lara Brock is a 48 y.o. female with chronic hepatitis B and C infection, hepatic cirrhosis with portal hypertension and portosystemic venous shunting who was scheduled for outpatient paracentesis on 7/19 but presented to [place] with abdominal distention, intractable vomiting, and shortness of breath was believed that her symptoms were due to her abdominal distention.  Her CBC was normal.  Her CMP was mostly normal except for mild elevation of transaminases which is consistent with previous presentation.  I had a long discussion with the ER physician about transferring the patient about appropriate transferring.  He ensured that she had intractable nausea and vomiting and this is the reason for transfer.  Plan for patient to get a paracentesis while in the hospital.  There were no signs or symptoms of SBP per the doctor.  I asked him to do a paracentesis to rule out this and he said he would not.  Plan disposition for the patient to get a paracentesis tomorrow be discharged.   Previously Declined (within the last year)

## 2023-07-18 NOTE — H&P
[facility]  [facility]   Patient Name: Lara Brock  : 1975  MRN: [id number]  Primary Care Physician:  Soy Garcia MD  Date of admission: 2023    Subjective   Subjective     Chief Complaint: Abdominal distention    HPI:    Lara Brock is a 48 y.o. female past medical history of hepatitis B and C infection, cirrhosis with portal hypertension and portosystemic venous shunting who was supposed to get a paracentesis as an outpatient tomorrow but presented to [place] with intractable vomiting, abdominal distention, shortness of breath or believe due to her distention.    Discussing with the patient states that she had 1 episode of emesis this morning it was fixed with Zofran.  The physician at [place], the patient was having intractable vomiting that was the reason for the transfer.  Patient does have abdominal pain on the right side on exam.  He is also requesting pain medication and says that fentanyl is worked really well for her in the past.  As result of this symptoms she was transferred.    Her vital signs are within normal limits the outside hospital.  CBC showed no abnormalities.  CMP shows mild elevation in AST and ALT.  Creatinine was normal at 0.7.  Patient be admitted to the hospital for therapeutic paracentesis and we will cover for SBP until we can get culture data tomorrow.    All systems reviewed abnormalities noted above      Personal History     Past Medical History:  Cirrhosis of the liver  History of polysubstance abuse  Hepatitis B and C  Seizure disorder    Past Surgical History:  Cholecystectomy  Tubal ligation    Family History:   No family history of liver disease    Social History:   Continues to smoke every day.  No alcohol.  Marijuana yes.    Home Medications:  dicyclomine, furosemide, gabapentin, lactulose, omeprazole, ondansetron ODT, and spironolactone    Allergies:  Allergies   Allergen Reactions     Codeine Nausea Only         Objective   Objective     Vitals:   Temp:  [97.8 °F (36.6 °C)] 97.8 °F (36.6 °C)  Heart Rate:  [106] 106  Resp:  [16] 16  BP: (155)/(99) 155/99    Physical Exam    Constitutional: Awake, alert, no acute distress   Eyes: Pupils equal, sclerae anicteric, no conjunctival injection   HENT: NCAT, mucous membranes moist   Neck: Supple, no thyromegaly, no lymphadenopathy, trachea midline   Respiratory: Clear to auscultation bilaterally, nonlabored respirations    Cardiovascular: RRR, no murmurs, rubs, or gallops, palpable pedal pulses bilaterally   Gastrointestinal: Abdominal distention.  Right sided discomfort.  No rebounding.  No guarding.  Fluid wave.  umbilical hernia   Musculoskeletal: No bilateral ankle edema, no clubbing or cyanosis to extremities   Psychiatric: Appropriate affect, cooperative   Neurologic: Oriented x 3, strength symmetric in all extremities, Cranial Nerves grossly intact to confrontation, speech clear   Skin: No rashes     Result Review    Result Review:  I have personally reviewed the results from the time of this admission to 7/18/2023 19:24 EDT and agree with these findings:  Reviewed imaging and labs from outside hospital    Assessment & Plan   Assessment / Plan     Assessment/Plan:   Ascites  Rule out SBP  Chronic hepatitis B and C  Mild transaminitis     Plan:  --Admit to hospital service  -- Ordered IR guided paracentesis for diagnostic and therapeutic paracentesis tomorrow  -- We will start patient on 2 g of ceftriaxone have a low suspicion of SBP  -- Send blood cultures  -- Due to patient's liver disease narcotic medication is not a good choice for her pain control and neither is NSAIDs.  -- She can have up to 2 g of Tylenol every 24 hours  -- If she needs them for breakthrough pain and tramadol would be reasonable.    Disposition:  If patient's studies are negative the patient may be discharged tomorrow    The patient does not have intractable nausea and  vomiting like was described by outside hospital.  The patient did not need to be transferred as there is no increasing care.  Compliance issue which I will discuss with her compliance department.        DVT prophylaxis:  Lovenox    CODE STATUS:    Level Of Support Discussed With: Patient  Code Status (Patient has no pulse and is not breathing): CPR (Attempt to Resuscitate)  Medical Interventions (Patient has pulse or is breathing): Full Support  Release to patient: Routine Release      Admission Status:  I believe this patient meets observation status.    Electronically signed by Guy Maddox MD, 07/18/23, 7:24 PM EDT.

## 2023-07-19 ENCOUNTER — APPOINTMENT (OUTPATIENT)
Dept: GENERAL RADIOLOGY | Facility: HOSPITAL | Age: 48
End: 2023-07-19
Payer: MEDICAID

## 2023-07-19 ENCOUNTER — APPOINTMENT (OUTPATIENT)
Dept: INTERVENTIONAL RADIOLOGY/VASCULAR | Facility: HOSPITAL | Age: 48
End: 2023-07-19
Payer: MEDICAID

## 2023-07-19 VITALS
OXYGEN SATURATION: 100 % | DIASTOLIC BLOOD PRESSURE: 91 MMHG | TEMPERATURE: 97.9 F | WEIGHT: 112.66 LBS | HEART RATE: 102 BPM | RESPIRATION RATE: 18 BRPM | SYSTOLIC BLOOD PRESSURE: 169 MMHG | BODY MASS INDEX: 23.55 KG/M2

## 2023-07-19 PROBLEM — R11.2 INTRACTABLE NAUSEA AND VOMITING: Status: ACTIVE | Noted: 2023-07-19

## 2023-07-19 LAB
ALBUMIN FLD-MCNC: 0.4 G/DL
ALBUMIN SERPL-MCNC: 3.1 G/DL (ref 3.5–5.2)
ALBUMIN/GLOB SERPL: 0.8 G/DL
ALP SERPL-CCNC: 189 U/L (ref 39–117)
ALT SERPL W P-5'-P-CCNC: 65 U/L (ref 1–33)
ANION GAP SERPL CALCULATED.3IONS-SCNC: 7.6 MMOL/L (ref 5–15)
APPEARANCE FLD: ABNORMAL
AST SERPL-CCNC: 71 U/L (ref 1–32)
BACTERIA UR QL AUTO: ABNORMAL /HPF
BASOPHILS # BLD AUTO: 0.03 10*3/MM3 (ref 0–0.2)
BASOPHILS NFR BLD AUTO: 0.4 % (ref 0–1.5)
BILIRUB SERPL-MCNC: 1 MG/DL (ref 0–1.2)
BILIRUB UR QL STRIP: NEGATIVE
BUN SERPL-MCNC: 9 MG/DL (ref 6–20)
BUN/CREAT SERPL: 11.7 (ref 7–25)
CALCIUM SPEC-SCNC: 8.2 MG/DL (ref 8.6–10.5)
CHLORIDE SERPL-SCNC: 104 MMOL/L (ref 98–107)
CLARITY UR: CLEAR
CO2 SERPL-SCNC: 24.4 MMOL/L (ref 22–29)
COLOR FLD: ABNORMAL
COLOR UR: YELLOW
CREAT SERPL-MCNC: 0.77 MG/DL (ref 0.57–1)
DEPRECATED RDW RBC AUTO: 45.6 FL (ref 37–54)
EGFRCR SERPLBLD CKD-EPI 2021: 94.7 ML/MIN/1.73
EOSINOPHIL # BLD AUTO: 0.1 10*3/MM3 (ref 0–0.4)
EOSINOPHIL NFR BLD AUTO: 1.4 % (ref 0.3–6.2)
EOSINOPHIL NFR FLD MANUAL: 2 %
ERYTHROCYTE [DISTWIDTH] IN BLOOD BY AUTOMATED COUNT: 14 % (ref 12.3–15.4)
GLOBULIN UR ELPH-MCNC: 3.7 GM/DL
GLUCOSE SERPL-MCNC: 128 MG/DL (ref 65–99)
GLUCOSE UR STRIP-MCNC: NEGATIVE MG/DL
HCT VFR BLD AUTO: 38.1 % (ref 34–46.6)
HGB BLD-MCNC: 13.4 G/DL (ref 12–15.9)
HGB UR QL STRIP.AUTO: NEGATIVE
HYALINE CASTS UR QL AUTO: ABNORMAL /LPF
IMM GRANULOCYTES # BLD AUTO: 0.05 10*3/MM3 (ref 0–0.05)
IMM GRANULOCYTES NFR BLD AUTO: 0.7 % (ref 0–0.5)
KETONES UR QL STRIP: NEGATIVE
LEUKOCYTE ESTERASE UR QL STRIP.AUTO: ABNORMAL
LYMPHOCYTES # BLD AUTO: 1.24 10*3/MM3 (ref 0.7–3.1)
LYMPHOCYTES NFR BLD AUTO: 17.8 % (ref 19.6–45.3)
LYMPHOCYTES NFR FLD MANUAL: 31 %
MACROPHAGE FLUID: 4 %
MAGNESIUM SERPL-MCNC: 2 MG/DL (ref 1.6–2.6)
MCH RBC QN AUTO: 31.5 PG (ref 26.6–33)
MCHC RBC AUTO-ENTMCNC: 35.2 G/DL (ref 31.5–35.7)
MCV RBC AUTO: 89.6 FL (ref 79–97)
MESOTHL CELL NFR FLD MANUAL: 5 %
MONOCYTES # BLD AUTO: 0.8 10*3/MM3 (ref 0.1–0.9)
MONOCYTES NFR BLD AUTO: 11.5 % (ref 5–12)
MONOCYTES NFR FLD: 32 %
NEUTROPHILS NFR BLD AUTO: 4.73 10*3/MM3 (ref 1.7–7)
NEUTROPHILS NFR BLD AUTO: 68.2 % (ref 42.7–76)
NEUTROPHILS NFR FLD MANUAL: 26 %
NITRITE UR QL STRIP: NEGATIVE
NRBC BLD AUTO-RTO: 0 /100 WBC (ref 0–0.2)
NUC CELL # FLD: 104 /MM3
PH UR STRIP.AUTO: 6.5 [PH] (ref 5–8)
PLATELET # BLD AUTO: 96 10*3/MM3 (ref 140–450)
PMV BLD AUTO: 10.5 FL (ref 6–12)
POTASSIUM SERPL-SCNC: 4.2 MMOL/L (ref 3.5–5.2)
PROT FLD-MCNC: 1 G/DL
PROT SERPL-MCNC: 6.8 G/DL (ref 6–8.5)
PROT UR QL STRIP: NEGATIVE
RBC # BLD AUTO: 4.25 10*6/MM3 (ref 3.77–5.28)
RBC # FLD AUTO: <2000 /MM3
RBC # UR STRIP: ABNORMAL /HPF
REF LAB TEST METHOD: ABNORMAL
SODIUM SERPL-SCNC: 136 MMOL/L (ref 136–145)
SP GR UR STRIP: 1.01 (ref 1–1.03)
SQUAMOUS #/AREA URNS HPF: ABNORMAL /HPF
UROBILINOGEN UR QL STRIP: ABNORMAL
WBC # UR STRIP: ABNORMAL /HPF
WBC NRBC COR # BLD: 6.95 10*3/MM3 (ref 3.4–10.8)

## 2023-07-19 PROCEDURE — 96375 TX/PRO/DX INJ NEW DRUG ADDON: CPT

## 2023-07-19 PROCEDURE — 83735 ASSAY OF MAGNESIUM: CPT | Performed by: INTERNAL MEDICINE

## 2023-07-19 PROCEDURE — 84157 ASSAY OF PROTEIN OTHER: CPT | Performed by: INTERNAL MEDICINE

## 2023-07-19 PROCEDURE — 80053 COMPREHEN METABOLIC PANEL: CPT | Performed by: INTERNAL MEDICINE

## 2023-07-19 PROCEDURE — 94761 N-INVAS EAR/PLS OXIMETRY MLT: CPT

## 2023-07-19 PROCEDURE — 87205 SMEAR GRAM STAIN: CPT | Performed by: INTERNAL MEDICINE

## 2023-07-19 PROCEDURE — 25010000002 MORPHINE PER 10 MG: Performed by: INTERNAL MEDICINE

## 2023-07-19 PROCEDURE — 76942 ECHO GUIDE FOR BIOPSY: CPT

## 2023-07-19 PROCEDURE — 94799 UNLISTED PULMONARY SVC/PX: CPT

## 2023-07-19 PROCEDURE — 81001 URINALYSIS AUTO W/SCOPE: CPT | Performed by: INTERNAL MEDICINE

## 2023-07-19 PROCEDURE — 85025 COMPLETE CBC W/AUTO DIFF WBC: CPT | Performed by: INTERNAL MEDICINE

## 2023-07-19 PROCEDURE — 87015 SPECIMEN INFECT AGNT CONCNTJ: CPT | Performed by: INTERNAL MEDICINE

## 2023-07-19 PROCEDURE — G0378 HOSPITAL OBSERVATION PER HR: HCPCS

## 2023-07-19 PROCEDURE — 82042 OTHER SOURCE ALBUMIN QUAN EA: CPT | Performed by: INTERNAL MEDICINE

## 2023-07-19 PROCEDURE — 25010000002 ALBUMIN HUMAN 25% PER 50 ML: Performed by: INTERNAL MEDICINE

## 2023-07-19 PROCEDURE — P9047 ALBUMIN (HUMAN), 25%, 50ML: HCPCS | Performed by: INTERNAL MEDICINE

## 2023-07-19 PROCEDURE — 87070 CULTURE OTHR SPECIMN AEROBIC: CPT | Performed by: INTERNAL MEDICINE

## 2023-07-19 PROCEDURE — 89051 BODY FLUID CELL COUNT: CPT | Performed by: INTERNAL MEDICINE

## 2023-07-19 PROCEDURE — 96376 TX/PRO/DX INJ SAME DRUG ADON: CPT

## 2023-07-19 PROCEDURE — 25010000002 MORPHINE PER 10 MG: Performed by: FAMILY MEDICINE

## 2023-07-19 PROCEDURE — 87075 CULTR BACTERIA EXCEPT BLOOD: CPT | Performed by: INTERNAL MEDICINE

## 2023-07-19 PROCEDURE — 71045 X-RAY EXAM CHEST 1 VIEW: CPT

## 2023-07-19 RX ORDER — ALBUMIN (HUMAN) 12.5 G/50ML
70 SOLUTION INTRAVENOUS ONCE
Status: DISCONTINUED | OUTPATIENT
Start: 2023-07-19 | End: 2023-07-19

## 2023-07-19 RX ORDER — MORPHINE SULFATE 2 MG/ML
2 INJECTION, SOLUTION INTRAMUSCULAR; INTRAVENOUS ONCE AS NEEDED
Status: COMPLETED | OUTPATIENT
Start: 2023-07-19 | End: 2023-07-19

## 2023-07-19 RX ORDER — ALBUMIN (HUMAN) 12.5 G/50ML
25 SOLUTION INTRAVENOUS
Status: COMPLETED | OUTPATIENT
Start: 2023-07-19 | End: 2023-07-19

## 2023-07-19 RX ORDER — HYDROCODONE BITARTRATE AND ACETAMINOPHEN 7.5; 325 MG/1; MG/1
1 TABLET ORAL EVERY 6 HOURS PRN
Qty: 12 TABLET | Refills: 0 | Status: SHIPPED | OUTPATIENT
Start: 2023-07-19

## 2023-07-19 RX ORDER — LIDOCAINE HYDROCHLORIDE 20 MG/ML
20 INJECTION, SOLUTION INFILTRATION; PERINEURAL ONCE
Status: COMPLETED | OUTPATIENT
Start: 2023-07-19 | End: 2023-07-19

## 2023-07-19 RX ORDER — MORPHINE SULFATE 2 MG/ML
4 INJECTION, SOLUTION INTRAMUSCULAR; INTRAVENOUS EVERY 4 HOURS PRN
Status: DISCONTINUED | OUTPATIENT
Start: 2023-07-19 | End: 2023-07-19 | Stop reason: HOSPADM

## 2023-07-19 RX ADMIN — SODIUM BICARBONATE 1 ML: 84 INJECTION INTRAVENOUS at 08:45

## 2023-07-19 RX ADMIN — MORPHINE SULFATE 4 MG: 2 INJECTION, SOLUTION INTRAMUSCULAR; INTRAVENOUS at 09:48

## 2023-07-19 RX ADMIN — ALBUMIN HUMAN 25 G: 0.25 SOLUTION INTRAVENOUS at 12:58

## 2023-07-19 RX ADMIN — ALBUMIN HUMAN 25 G: 0.25 SOLUTION INTRAVENOUS at 13:46

## 2023-07-19 RX ADMIN — MORPHINE SULFATE 4 MG: 2 INJECTION, SOLUTION INTRAMUSCULAR; INTRAVENOUS at 13:50

## 2023-07-19 RX ADMIN — Medication 10 ML: at 08:18

## 2023-07-19 RX ADMIN — ALBUMIN HUMAN 25 G: 0.25 SOLUTION INTRAVENOUS at 12:18

## 2023-07-19 RX ADMIN — LIDOCAINE HYDROCHLORIDE 9 ML: 20 INJECTION, SOLUTION INFILTRATION; PERINEURAL at 08:45

## 2023-07-19 RX ADMIN — MORPHINE SULFATE 2 MG: 2 INJECTION, SOLUTION INTRAMUSCULAR; INTRAVENOUS at 03:41

## 2023-07-19 RX ADMIN — MORPHINE SULFATE 4 MG: 2 INJECTION, SOLUTION INTRAMUSCULAR; INTRAVENOUS at 18:04

## 2023-07-19 NOTE — NURSING NOTE
"2230: Upon initial assessment, pt L foot was cool to the touch. R foot warm. Laci 2+  pulses present. No complaint of pain or tenderness. No redness or increased swelling noted. Notified on call PA. No new orders.    2303: Pt refused Lovenox administration. Per pt, \"I was told I'm not supposed to take any kind of blood thinners because my blood is already too thin\". Notified on call PA. Lovenox held.     0240: Pt continues to complain of increased abdominal pain. Tramadol and Tylenol previously given per MAR with no relief. Pt tachypneic. SOA and shallow breathing noted. Per patient, \"my liver is already shot, I'll be dead in a year without a new one, so please give me something for the pain\". Notified on call PA. See new orders.   "

## 2023-07-19 NOTE — DISCHARGE SUMMARY
[facility]         HOSPITALIST  DISCHARGE SUMMARY    Patient Name: Lara Brock  : 1975  MRN: [id number]    Date of Admission: 2023  Date of Discharge: 2023  Primary Care Physician: Luly Myers, MARCOS    Consults       No orders found for last 30 day(s).            Active and Resolved Hospital Problems:  Active Hospital Problems    Diagnosis POA    **Nausea and vomiting [R11.2] Yes    Intractable nausea and vomiting [R11.2] Yes      Resolved Hospital Problems   No resolved problems to display.   Ascites  SBP ruled out  Shortness of air secondary to ascites  Chronic hepatitis B and C  Mild transaminitis     Hospital Course     Hospital Course:  Lara Brock is a 48 y.o. female past medical history of hepatitis B and C infection, cirrhosis with portal hypertension and portosystemic venous shunting who was supposed to get a paracentesis as an outpatient tomorrow but presented to [place] with intractable vomiting, abdominal distention, shortness of breath.  Transferred to [facility] due to concern for SBP.  Paracentesis performed on  with 9.6 L removed, cytology negative for SBP.  Transfused albumin.  Her pain, vomiting, shortness of breath all improved drastically during her hospital stay.  She was discharged home in stable condition 2023.  Recommend follow-up with PCP within 1 week, gastroenterology as previously scheduled.  Recommend she continue with her weekly scheduled paracentesis which she frequently misses.    Day of Discharge     Vital Signs:  Temp:  [97.3 °F (36.3 °C)-98.6 °F (37 °C)] 97.8 °F (36.6 °C)  Heart Rate:  [] 105  Resp:  [16-24] 18  BP: (153-178)/() 168/97  Physical Exam:   Gen: NAD, WDWN  ENT: PERRL, EOMI   CV: RRR no MRG  Pulm: CTAB, no w/r/r  GI: Abd soft, NTND, +bs  Neuro: Moving all extremities spontaneously, CN II-XII grossly intact   Psych: A&O*3, normal mood and affect  Skin: No lesions or rashes  noted    Discharge Details        Discharge Medications        New Medications        Instructions Start Date   HYDROcodone-acetaminophen 7.5-325 MG per tablet  Commonly known as: Norco   1 tablet, Oral, Every 6 Hours PRN             Continue These Medications        Instructions Start Date   furosemide 40 MG tablet  Commonly known as: LASIX   40 mg, Oral, Daily      gabapentin 300 MG capsule  Commonly known as: NEURONTIN   300 mg, Oral, 3 Times Daily      lactulose 10 GM/15ML solution  Commonly known as: CHRONULAC   20 g, Oral, Daily      omeprazole 20 MG capsule  Commonly known as: priLOSEC   20 mg, Oral, Daily      ondansetron ODT 4 MG disintegrating tablet  Commonly known as: ZOFRAN-ODT   4 mg, Translingual, Every 8 Hours PRN      spironolactone 100 MG tablet  Commonly known as: ALDACTONE   100 mg, Oral, Daily               Allergies   Allergen Reactions    Codeine Nausea Only       Discharge Disposition:  Home or Self Care    Diet:  Hospital:  Diet Order   Procedures    Diet: Cardiac Diets, Fluid Restriction (240 mL/tray) Diets; Low Sodium (2g); 2000 mL/day; Texture: Regular Texture (IDDSI 7); Fluid Consistency: Thin (IDDSI 0)       Discharge Activity:   Activity Instructions       Activity as Tolerated              CODE STATUS:  Code Status and Medical Interventions:   Ordered at: 07/18/23 1900     Level Of Support Discussed With:    Patient     Code Status (Patient has no pulse and is not breathing):    CPR (Attempt to Resuscitate)     Medical Interventions (Patient has pulse or is breathing):    Full Support     Release to patient:    Routine Release         Future Appointments   Date Time Provider Department Center   7/26/2023  8:00 AM [facility] IR US 1 [facility] IR [facility]   7/31/2023 11:15 AM Nancy Cruz APRN [facility] GE ETWR [facility]   8/2/2023  8:00 AM [facility] IR US 1 [facility] IR [facility]   8/9/2023  8:00 AM [facility] IR US 1 [facility] IR [facility]       Additional Instructions for the Follow-ups that You Need to Schedule       Discharge  Follow-up with PCP   As directed       Currently Documented PCP:    Luly Myers APRN    PCP Phone Number:    [phone]     Follow Up Details: 3-5 days         Discharge Follow-up with Specified Provider: OBGYN; 1 Month   As directed      To: OBGYN    Follow Up: 1 Month                 Pertinent  and/or Most Recent Results     PROCEDURES:   Paracentesis    LAB RESULTS:      Lab 07/19/23  0517   WBC 6.95   HEMOGLOBIN 13.4   HEMATOCRIT 38.1   PLATELETS 96*   NEUTROS ABS 4.73   IMMATURE GRANS (ABS) 0.05   LYMPHS ABS 1.24   MONOS ABS 0.80   EOS ABS 0.10   MCV 89.6         Lab 07/19/23  0517   SODIUM 136   POTASSIUM 4.2   CHLORIDE 104   CO2 24.4   ANION GAP 7.6   BUN 9   CREATININE 0.77   EGFR 94.7   GLUCOSE 128*   CALCIUM 8.2*   MAGNESIUM 2.0         Lab 07/19/23  0517   TOTAL PROTEIN 6.8   ALBUMIN 3.1*   GLOBULIN 3.7   ALT (SGPT) 65*   AST (SGOT) 71*   BILIRUBIN 1.0   ALK PHOS 189*                     Brief Urine Lab Results  (Last result in the past 365 days)        Color   Clarity   Blood   Leuk Est   Nitrite   Protein   CREAT   Urine HCG        07/19/23 1221 Yellow   Clear   Negative   Trace   Negative   Negative                 Microbiology Results (last 10 days)       ** No results found for the last 240 hours. **            CT Abdomen Pelvis With Contrast    Result Date: 7/2/2023  Impression:   A large amount of ascites is present.     Please note that portions of this note were completed with a voice recognition program.  CATHIE BROWNING JR, MD       Electronically Signed and Approved By: CATHIE BROWNING JR, MD on 7/02/2023 at 1:32              XR Chest 1 View    Result Date: 7/19/2023  Impression:   No acute infiltrate is appreciated.    Please note that portions of this note were completed with a voice recognition program.  CATHIE BROWNING JR, MD       Electronically Signed and Approved By: CATHIE BROWNING JR, MD on 7/19/2023 at 3:21              XR Chest 1 View    Result Date: 7/1/2023  Impression:  No  acute disease.   DAVIDA MARCUM MD       Electronically Signed and Approved By: DAVIDA MARCUM MD on 7/01/2023 at 3:11             US Paracentesis    Result Date: 7/2/2023  Impression:  Successful ultrasound-guided therapeutic and diagnostic paracentesis without evidence of complication.     ROXANA PIPER MD       Electronically Signed and Approved By: ROXANA PIPER MD on 7/02/2023 at 14:40              Results for orders placed during the hospital encounter of 10/26/21    Duplex venous lower extremity left CAR    Interpretation Summary  · Normal left lower extremity venous duplex scan.      Results for orders placed during the hospital encounter of 10/26/21    Duplex venous lower extremity left CAR    Interpretation Summary  · Normal left lower extremity venous duplex scan.          Labs Pending at Discharge:  Pending Labs       Order Current Status    Albumin, Fluid - Body Fluid, Peritoneum In process    Anaerobic Culture - Body Fluid, Peritoneum In process    Blood Culture - Blood, Arm, Left In process    Blood Culture - Blood, Arm, Right In process    Body Fluid Cell Count With Differential - Body Fluid, Peritoneum In process    Body Fluid Culture - Body Fluid, Peritoneum In process    Body fluid cell count - Body Fluid, Peritoneum In process    Protein, Body Fluid - Body Fluid, Peritoneum In process              Time spent on Discharge including face to face service: 33 minutes    Electronically signed by Melvin Serrano MD, 07/19/23, 2:01 PM EDT.

## 2023-07-19 NOTE — PLAN OF CARE
Goal Outcome Evaluation:           Progress: no change  Outcome Evaluation: alert and oriented x 4. up ad leon. medicated for pain per orders this shift. no other complaints at this time.

## 2023-07-19 NOTE — CONSULTS
Nutrition Services    Patient Name: Lara Brock  YOB: 1975  MRN: [id number]  Admission date: 7/18/2023      CLINICAL NUTRITION ASSESSMENT      Reason for Assessment  Identified at risk by screening criteria, MST score 2+     H&P:    Past Medical History:   Diagnosis Date    Advanced hepatic cirrhosis     History of drug use     Infectious viral hepatitis     Seizure     Smoking greater than 30 pack years         Current Problems:   Active Hospital Problems    Diagnosis     **Nausea and vomiting     Intractable nausea and vomiting         Nutrition/Diet History         Narrative     48 year old female admitted for paracentesis - Per pt 9.16 L of fluid removed this AM.  Pt reports very poor/no PO intake for past four days due to ascites/fluid build up.  Pt states my appetite is good but when the fluid builds up I cannot eat.  Reports N/V past 4 days.    Pt reports no upper or lower teeth however, no problems chewing the food provided.     Wt decrease 19.8# (15%) in 1 days related to paracentesis.  Pt continues with ascites.  Protruding umbilicus due to ascites.  Ribs cannot be palpated related to ascites.      NFPE conducted by RD findings meet criteria for severe malnutrition related to poor nutrient intake, significant wt loss, muscle wasting and fluid retention.  See findings below.      RD provided MNT related to NFPE findings and goal to arrest/reverse muscle wasting.  Pt agreeable to drinking ONS and requested resources for discharge.  RD provided ONS coupons for use upon discharge.     Pt agreeable to Boost + one meal, Boost Pudding to prevent using fluids due to fluid restriction and cottage cheese and peaches.     Anthropometrics        Current Height, Weight    Weight: 51.1 kg (112 lb 10.5 oz)   Current BMI Body mass index is 23.55 kg/m².       Weight Hx  Wt Readings from Last 30 Encounters:   07/19/23 1017 51.1 kg (112 lb 10.5 oz)   07/19/23 0500 59.7 kg (131 lb 9.8 oz)   07/18/23  1857 59.7 kg (131 lb 9.8 oz)   07/02/23 0100 61.4 kg (135 lb 5.8 oz)   07/01/23 0215 60.3 kg (132 lb 15 oz)   06/06/23 0303 53.1 kg (117 lb 1 oz)   05/19/23 1038 53.5 kg (118 lb)   04/14/23 1937 57.7 kg (127 lb 3.3 oz)            Wt Change Observation Pt with 19.8# wt decrease after paracentesis     Estimated/Assessed Needs       Energy Requirements    EST Needs (kcal/day) 35 kcal/kg = 1785 calories       Protein Requirements    EST Daily Needs (g/day) 1.2-1.6 g/kg = 42-82 grams       Fluid Requirements     Estimated Needs (mL/day) 25 ml/kg = 1275 ml (5-6 cups)     Labs/Medications         Pertinent Labs Reviewed.   Results from last 7 days   Lab Units 07/19/23  0517   SODIUM mmol/L 136   POTASSIUM mmol/L 4.2   CHLORIDE mmol/L 104   CO2 mmol/L 24.4   BUN mg/dL 9   CREATININE mg/dL 0.77   CALCIUM mg/dL 8.2*   BILIRUBIN mg/dL 1.0   ALK PHOS U/L 189*   ALT (SGPT) U/L 65*   AST (SGOT) U/L 71*   GLUCOSE mg/dL 128*     Results from last 7 days   Lab Units 07/19/23  0517   MAGNESIUM mg/dL 2.0   HEMOGLOBIN g/dL 13.4   HEMATOCRIT % 38.1     No results found for: COVID19  No results found for: HGBA1C      Pertinent Medications Reviewed.     Current Nutrition Orders & Evaluation of Intake       Oral Nutrition     Current PO Diet Diet: Cardiac Diets, Fluid Restriction (240 mL/tray) Diets; Low Sodium (2g); 2000 mL/day; Texture: Regular Texture (IDDSI 7); Fluid Consistency: Thin (IDDSI 0)   Supplement No active supplement orders       Malnutrition Severity Assessment      Patient meets criteria for : Severe Malnutrition  Malnutrition Type (last 8 hours)       Malnutrition Severity Assessment       Row Name 07/19/23 1311       Malnutrition Severity Assessment    Malnutrition Type Chronic Disease - Related Malnutrition      Row Name 07/19/23 1311       Insufficient Energy Intake     Insufficient Energy Intake Findings Severe  No intake for 4 days and pt reports poor intake when fluid/ascites builds up.    Insufficient Energy Intake   < or equal to 50% of est. energy requirement for > or equal to 5d)      Row Name 07/19/23 1311       Unintentional Weight Loss     Unintentional Weight Loss Findings Severe  Paracentesis with 19.8# wt decrease in 1 day.    Unintentional Weight Loss  Weight loss greater than 2% in one week      Row Name 07/19/23 1311       Muscle Loss    Loss of Muscle Mass Findings Severe    Jain Region Severe - deep hollowing/scooping, lack of muscle to touch, facial bones well defined    Clavicle Bone Region Severe - protruding prominent bone    Acromion Bone Region Severe - squared shoulders, bones, and acromion process protrusion prominent    Scapular Bone Region Severe - prominent bones, depressions easily visible between ribs, scapula, spine, shoulders    Dorsal Hand Region Severe - prominent depression    Patellar Region Severe - prominent bone, square looking, very little muscle definition    Anterior Thigh Region Moderate - mild depression on inner thigh    Posterior Calf Region Moderate - some roundness, slight firmness      Row Name 07/19/23 1311       Fat Loss    Subcutaneous Fat Loss Findings Moderate    Orbital Region  Severe - pronounced hollowness/depression, dark circles, loose saggy skin    Thoracic & Lumbar Region None  unable to palpate ribs or iliac crest due to ascites      Row Name 07/19/23 1311       Fluid Accumulation (Edema)    Fluid Acumulation Findings Severe  taunt lower extremities and ascites present    Fluid Accumulation  Severe equals 3+ or 4+ pitting edema      Row Name 07/19/23 1311       Declining Functional Status    Declining Functional Status Findings N/A      Row Name 07/19/23 1311       Criteria Met (Must meet criteria for severity in at least 2 of these categories: M Wasting, Fat Loss, Fluid, Secondary Signs, Wt. Status, Intake)    Patient meets criteria for  Severe Malnutrition                       Nutrition Diagnosis         Nutrition Dx Problem 1 Severe malnutrition related to  increased nutrient needs due to catabolic disease as evidenced by unintended wt change. and body composition changes.       Nutrition Intervention         Cardiac Diet/Regular Texture/Thin Liquids  Restrict Fluids to 240 ml per meal  Boost + with Breakfast, Boost Pudding Lunch and Supper  Cottage Cheese and Peaches afternoon snack  +960 calories, 40 grams of protein     Medical Nutrition Therapy/Nutrition Education          Learner     Readiness Patient  Acceptance     Method     Response Explanation  Verbalizes understanding     Monitor/Evaluation        Monitor Per protocol, PO intake, Supplement intake, Pertinent labs, POC/GOC       Nutrition Discharge Plan         To be determined       Electronically signed by:  Liana Patino RD  07/19/23 13:21 EDT

## 2023-07-22 LAB
BACTERIA FLD CULT: NORMAL
GRAM STN SPEC: NORMAL

## 2023-07-23 LAB
BACTERIA SPEC AEROBE CULT: NORMAL
BACTERIA SPEC AEROBE CULT: NORMAL

## 2023-07-24 LAB — BACTERIA SPEC ANAEROBE CULT: NORMAL

## 2023-08-01 ENCOUNTER — HOSPITAL ENCOUNTER (OUTPATIENT)
Dept: INTERVENTIONAL RADIOLOGY/VASCULAR | Facility: HOSPITAL | Age: 48
Discharge: HOME | End: 2023-08-01
Admitting: NURSE PRACTITIONER
Payer: MEDICAID

## 2023-08-01 VITALS
OXYGEN SATURATION: 100 % | DIASTOLIC BLOOD PRESSURE: 91 MMHG | HEART RATE: 110 BPM | RESPIRATION RATE: 20 BRPM | SYSTOLIC BLOOD PRESSURE: 146 MMHG

## 2023-08-01 DIAGNOSIS — R18.8 OTHER ASCITES: ICD-10-CM

## 2023-08-01 PROCEDURE — 25010000002 ALBUMIN HUMAN 25% PER 50 ML: Performed by: NURSE PRACTITIONER

## 2023-08-01 PROCEDURE — 76942 ECHO GUIDE FOR BIOPSY: CPT

## 2023-08-01 PROCEDURE — P9047 ALBUMIN (HUMAN), 25%, 50ML: HCPCS | Performed by: NURSE PRACTITIONER

## 2023-08-01 PROCEDURE — C1729 CATH, DRAINAGE: HCPCS

## 2023-08-01 RX ORDER — ALBUMIN (HUMAN) 12.5 G/50ML
12.5 SOLUTION INTRAVENOUS ONCE
Status: COMPLETED | OUTPATIENT
Start: 2023-08-01 | End: 2023-08-01

## 2023-08-01 RX ORDER — LIDOCAINE HYDROCHLORIDE 20 MG/ML
10 INJECTION, SOLUTION INFILTRATION; PERINEURAL ONCE
Status: COMPLETED | OUTPATIENT
Start: 2023-08-01 | End: 2023-08-01

## 2023-08-01 RX ORDER — ALBUMIN (HUMAN) 12.5 G/50ML
25 SOLUTION INTRAVENOUS ONCE
Status: COMPLETED | OUTPATIENT
Start: 2023-08-01 | End: 2023-08-01

## 2023-08-01 RX ADMIN — ALBUMIN HUMAN 12.5 G: 0.25 SOLUTION INTRAVENOUS at 15:20

## 2023-08-01 RX ADMIN — LIDOCAINE HYDROCHLORIDE 10 ML: 20 INJECTION, SOLUTION INFILTRATION; PERINEURAL at 13:40

## 2023-08-01 RX ADMIN — SODIUM BICARBONATE 1 MEQ: 84 INJECTION, SOLUTION INTRAVENOUS at 13:40

## 2023-08-01 RX ADMIN — ALBUMIN HUMAN 25 G: 0.25 SOLUTION INTRAVENOUS at 14:58

## 2023-08-01 RX ADMIN — ALBUMIN HUMAN 25 G: 0.25 SOLUTION INTRAVENOUS at 14:45

## 2023-08-09 ENCOUNTER — HOSPITAL ENCOUNTER (OUTPATIENT)
Dept: INTERVENTIONAL RADIOLOGY/VASCULAR | Facility: HOSPITAL | Age: 48
Discharge: HOME | End: 2023-08-09
Payer: MEDICAID

## 2023-08-18 ENCOUNTER — HOSPITAL ENCOUNTER (OUTPATIENT)
Dept: INTERVENTIONAL RADIOLOGY/VASCULAR | Facility: HOSPITAL | Age: 48
Discharge: HOME | End: 2023-08-18
Payer: MEDICAID

## 2023-08-18 VITALS
SYSTOLIC BLOOD PRESSURE: 138 MMHG | DIASTOLIC BLOOD PRESSURE: 71 MMHG | HEART RATE: 74 BPM | RESPIRATION RATE: 18 BRPM | OXYGEN SATURATION: 100 %

## 2023-08-18 LAB
APTT PPP: 28.7 SECONDS (ref 24.2–34.2)
INR PPP: 1.52 (ref 0.86–1.15)
PLATELET # BLD AUTO: 94 10*3/MM3 (ref 140–450)
PROTHROMBIN TIME: 18.2 SECONDS (ref 11.8–14.9)

## 2023-08-18 PROCEDURE — 25010000002 ALBUMIN HUMAN 25% PER 50 ML: Performed by: NURSE PRACTITIONER

## 2023-08-18 PROCEDURE — C1729 CATH, DRAINAGE: HCPCS

## 2023-08-18 PROCEDURE — 85730 THROMBOPLASTIN TIME PARTIAL: CPT | Performed by: NURSE PRACTITIONER

## 2023-08-18 PROCEDURE — P9047 ALBUMIN (HUMAN), 25%, 50ML: HCPCS | Performed by: NURSE PRACTITIONER

## 2023-08-18 PROCEDURE — 76942 ECHO GUIDE FOR BIOPSY: CPT

## 2023-08-18 PROCEDURE — 85049 AUTOMATED PLATELET COUNT: CPT | Performed by: NURSE PRACTITIONER

## 2023-08-18 PROCEDURE — 85610 PROTHROMBIN TIME: CPT | Performed by: NURSE PRACTITIONER

## 2023-08-18 RX ORDER — LIDOCAINE HYDROCHLORIDE 20 MG/ML
20 INJECTION, SOLUTION INFILTRATION; PERINEURAL ONCE
Status: COMPLETED | OUTPATIENT
Start: 2023-08-18 | End: 2023-08-18

## 2023-08-18 RX ORDER — ALBUMIN (HUMAN) 12.5 G/50ML
25 SOLUTION INTRAVENOUS ONCE
Status: COMPLETED | OUTPATIENT
Start: 2023-08-18 | End: 2023-08-18

## 2023-08-18 RX ORDER — ALBUMIN (HUMAN) 12.5 G/50ML
12.5 SOLUTION INTRAVENOUS ONCE
Status: COMPLETED | OUTPATIENT
Start: 2023-08-18 | End: 2023-08-18

## 2023-08-18 RX ADMIN — ALBUMIN HUMAN 12.5 G: 0.25 SOLUTION INTRAVENOUS at 13:20

## 2023-08-18 RX ADMIN — LIDOCAINE HYDROCHLORIDE 9 ML: 20 INJECTION, SOLUTION INFILTRATION; PERINEURAL at 11:40

## 2023-08-18 RX ADMIN — ALBUMIN HUMAN 25 G: 0.25 SOLUTION INTRAVENOUS at 12:54

## 2023-08-18 RX ADMIN — SODIUM BICARBONATE 1 ML: 84 INJECTION, SOLUTION INTRAVENOUS at 11:40

## 2023-08-18 RX ADMIN — ALBUMIN HUMAN 25 G: 0.25 SOLUTION INTRAVENOUS at 12:46

## 2023-08-18 RX ADMIN — ALBUMIN HUMAN 25 G: 0.25 SOLUTION INTRAVENOUS at 13:10

## 2023-08-18 NOTE — NURSING NOTE
Pt arrived to xray holding post paracentesis. 11.4 liters removed per tech report. Consent signed. Right lateral abdominal dressing clean, dry, and intact. Site soft and nontender. No visible drainage. Vitals stable. Denies pain/discomfort. Pt given drink per request.

## 2023-08-18 NOTE — NURSING NOTE
Infusion complete. IV discontinued. Right lateral abdominal dressing clean, dry, and intact. No visible drainage. Denies pain/discomfort. Vitals stable. Patient escorted to main entrance ambulatory, accompanied by RN. Driven home by family.

## 2023-09-01 ENCOUNTER — HOSPITAL ENCOUNTER (OUTPATIENT)
Dept: INTERVENTIONAL RADIOLOGY/VASCULAR | Facility: HOSPITAL | Age: 48
Discharge: HOME | End: 2023-09-01
Payer: MEDICAID

## 2023-09-01 VITALS — SYSTOLIC BLOOD PRESSURE: 153 MMHG | DIASTOLIC BLOOD PRESSURE: 87 MMHG | HEART RATE: 98 BPM | OXYGEN SATURATION: 99 %

## 2023-09-01 LAB
APPEARANCE FLD: CLEAR
COLOR FLD: YELLOW
LYMPHOCYTES NFR FLD MANUAL: 27 %
MACROPHAGE FLUID: 7 %
MONOCYTES NFR FLD: 21 %
NEUTROPHILS NFR FLD MANUAL: 45 %
NUC CELL # FLD: 96 /MM3
RBC # FLD AUTO: <2000 /MM3

## 2023-09-01 PROCEDURE — 76942 ECHO GUIDE FOR BIOPSY: CPT

## 2023-09-01 PROCEDURE — 25010000002 ALBUMIN HUMAN 25% PER 50 ML: Performed by: NURSE PRACTITIONER

## 2023-09-01 PROCEDURE — P9047 ALBUMIN (HUMAN), 25%, 50ML: HCPCS | Performed by: NURSE PRACTITIONER

## 2023-09-01 PROCEDURE — 89051 BODY FLUID CELL COUNT: CPT | Performed by: NURSE PRACTITIONER

## 2023-09-01 RX ORDER — ALBUMIN (HUMAN) 12.5 G/50ML
25 SOLUTION INTRAVENOUS ONCE
Status: DISCONTINUED | OUTPATIENT
Start: 2023-09-01 | End: 2023-09-02 | Stop reason: HOSPADM

## 2023-09-01 RX ORDER — LIDOCAINE HYDROCHLORIDE 20 MG/ML
20 INJECTION, SOLUTION INFILTRATION; PERINEURAL ONCE
Status: COMPLETED | OUTPATIENT
Start: 2023-09-01 | End: 2023-09-01

## 2023-09-01 RX ORDER — ALBUMIN (HUMAN) 12.5 G/50ML
25 SOLUTION INTRAVENOUS ONCE
Status: COMPLETED | OUTPATIENT
Start: 2023-09-01 | End: 2023-09-01

## 2023-09-01 RX ADMIN — ALBUMIN HUMAN 25 G: 0.25 SOLUTION INTRAVENOUS at 13:00

## 2023-09-01 RX ADMIN — SODIUM BICARBONATE 1 ML: 84 INJECTION, SOLUTION INTRAVENOUS at 11:50

## 2023-09-01 RX ADMIN — LIDOCAINE HYDROCHLORIDE 9 ML: 20 INJECTION, SOLUTION INFILTRATION; PERINEURAL at 11:50

## 2023-09-12 ENCOUNTER — APPOINTMENT (OUTPATIENT)
Dept: CT IMAGING | Facility: HOSPITAL | Age: 48
End: 2023-09-12

## 2023-09-12 ENCOUNTER — HOSPITAL ENCOUNTER (OUTPATIENT)
Dept: INTERVENTIONAL RADIOLOGY/VASCULAR | Facility: HOSPITAL | Age: 48
Discharge: HOME | End: 2023-09-12
Payer: MEDICAID

## 2023-09-12 ENCOUNTER — HOSPITAL ENCOUNTER (INPATIENT)
Facility: HOSPITAL | Age: 48
LOS: 3 days | Discharge: HOME | End: 2023-09-15
Attending: EMERGENCY MEDICINE | Admitting: INTERNAL MEDICINE

## 2023-09-12 DIAGNOSIS — S31.109A OPEN WOUND OF ABDOMINAL WALL, INITIAL ENCOUNTER: ICD-10-CM

## 2023-09-12 DIAGNOSIS — R10.9 ABDOMINAL PAIN, UNSPECIFIED ABDOMINAL LOCATION: Primary | ICD-10-CM

## 2023-09-12 LAB
ALBUMIN SERPL-MCNC: 2.9 G/DL (ref 3.5–5.2)
ALBUMIN/GLOB SERPL: 0.9 G/DL
ALP SERPL-CCNC: 139 U/L (ref 39–117)
ALT SERPL W P-5'-P-CCNC: 25 U/L (ref 1–33)
ANION GAP SERPL CALCULATED.3IONS-SCNC: 7 MMOL/L (ref 5–15)
APTT PPP: 29.8 SECONDS (ref 78–95.9)
AST SERPL-CCNC: 32 U/L (ref 1–32)
BASOPHILS # BLD AUTO: 0.02 10*3/MM3 (ref 0–0.2)
BASOPHILS NFR BLD AUTO: 0.3 % (ref 0–1.5)
BILIRUB SERPL-MCNC: 1.3 MG/DL (ref 0–1.2)
BUN SERPL-MCNC: 11 MG/DL (ref 6–20)
BUN/CREAT SERPL: 12.4 (ref 7–25)
CALCIUM SPEC-SCNC: 8.1 MG/DL (ref 8.6–10.5)
CHLORIDE SERPL-SCNC: 93 MMOL/L (ref 98–107)
CO2 SERPL-SCNC: 29 MMOL/L (ref 22–29)
CREAT SERPL-MCNC: 0.89 MG/DL (ref 0.57–1)
D-LACTATE SERPL-SCNC: 2.3 MMOL/L (ref 0.5–2)
DEPRECATED RDW RBC AUTO: 44.4 FL (ref 37–54)
EGFRCR SERPLBLD CKD-EPI 2021: 79.6 ML/MIN/1.73
EOSINOPHIL # BLD AUTO: 0.03 10*3/MM3 (ref 0–0.4)
EOSINOPHIL NFR BLD AUTO: 0.4 % (ref 0.3–6.2)
ERYTHROCYTE [DISTWIDTH] IN BLOOD BY AUTOMATED COUNT: 13.8 % (ref 12.3–15.4)
GLOBULIN UR ELPH-MCNC: 3.4 GM/DL
GLUCOSE SERPL-MCNC: 119 MG/DL (ref 65–99)
HCT VFR BLD AUTO: 39.6 % (ref 34–46.6)
HGB BLD-MCNC: 13.6 G/DL (ref 12–15.9)
IMM GRANULOCYTES # BLD AUTO: 0.02 10*3/MM3 (ref 0–0.05)
IMM GRANULOCYTES NFR BLD AUTO: 0.3 % (ref 0–0.5)
INR PPP: 1.47 (ref 0.86–1.15)
LIPASE SERPL-CCNC: 20 U/L (ref 13–60)
LYMPHOCYTES # BLD AUTO: 0.97 10*3/MM3 (ref 0.7–3.1)
LYMPHOCYTES NFR BLD AUTO: 12.4 % (ref 19.6–45.3)
MCH RBC QN AUTO: 30.6 PG (ref 26.6–33)
MCHC RBC AUTO-ENTMCNC: 34.3 G/DL (ref 31.5–35.7)
MCV RBC AUTO: 89 FL (ref 79–97)
MONOCYTES # BLD AUTO: 0.65 10*3/MM3 (ref 0.1–0.9)
MONOCYTES NFR BLD AUTO: 8.3 % (ref 5–12)
NEUTROPHILS NFR BLD AUTO: 6.14 10*3/MM3 (ref 1.7–7)
NEUTROPHILS NFR BLD AUTO: 78.3 % (ref 42.7–76)
NRBC BLD AUTO-RTO: 0 /100 WBC (ref 0–0.2)
PLATELET # BLD AUTO: 123 10*3/MM3 (ref 140–450)
PMV BLD AUTO: 10.9 FL (ref 6–12)
POTASSIUM SERPL-SCNC: 3.4 MMOL/L (ref 3.5–5.2)
PROCALCITONIN SERPL-MCNC: 0.2 NG/ML (ref 0–0.25)
PROT SERPL-MCNC: 6.3 G/DL (ref 6–8.5)
PROTHROMBIN TIME: 17.8 SECONDS (ref 11.8–14.9)
RBC # BLD AUTO: 4.45 10*6/MM3 (ref 3.77–5.28)
SODIUM SERPL-SCNC: 129 MMOL/L (ref 136–145)
WBC NRBC COR # BLD: 7.83 10*3/MM3 (ref 3.4–10.8)

## 2023-09-12 PROCEDURE — P9047 ALBUMIN (HUMAN), 25%, 50ML: HCPCS | Performed by: INTERNAL MEDICINE

## 2023-09-12 PROCEDURE — 85730 THROMBOPLASTIN TIME PARTIAL: CPT | Performed by: EMERGENCY MEDICINE

## 2023-09-12 PROCEDURE — 84145 PROCALCITONIN (PCT): CPT | Performed by: INTERNAL MEDICINE

## 2023-09-12 PROCEDURE — 25010000002 MORPHINE PER 10 MG: Performed by: PHYSICIAN ASSISTANT

## 2023-09-12 PROCEDURE — 25010000002 MORPHINE PER 10 MG: Performed by: INTERNAL MEDICINE

## 2023-09-12 PROCEDURE — 25010000002 PIPERACILLIN SOD-TAZOBACTAM PER 1 G: Performed by: EMERGENCY MEDICINE

## 2023-09-12 PROCEDURE — 99223 1ST HOSP IP/OBS HIGH 75: CPT | Performed by: INTERNAL MEDICINE

## 2023-09-12 PROCEDURE — 25010000002 MORPHINE PER 10 MG: Performed by: EMERGENCY MEDICINE

## 2023-09-12 PROCEDURE — 87040 BLOOD CULTURE FOR BACTERIA: CPT | Performed by: EMERGENCY MEDICINE

## 2023-09-12 PROCEDURE — 85025 COMPLETE CBC W/AUTO DIFF WBC: CPT | Performed by: EMERGENCY MEDICINE

## 2023-09-12 PROCEDURE — 85610 PROTHROMBIN TIME: CPT | Performed by: EMERGENCY MEDICINE

## 2023-09-12 PROCEDURE — 25010000002 ALBUMIN HUMAN 25% PER 50 ML: Performed by: INTERNAL MEDICINE

## 2023-09-12 PROCEDURE — 74177 CT ABD & PELVIS W/CONTRAST: CPT

## 2023-09-12 PROCEDURE — 25510000001 IOPAMIDOL PER 1 ML: Performed by: EMERGENCY MEDICINE

## 2023-09-12 PROCEDURE — 83690 ASSAY OF LIPASE: CPT | Performed by: EMERGENCY MEDICINE

## 2023-09-12 PROCEDURE — 80053 COMPREHEN METABOLIC PANEL: CPT | Performed by: EMERGENCY MEDICINE

## 2023-09-12 PROCEDURE — 83605 ASSAY OF LACTIC ACID: CPT | Performed by: EMERGENCY MEDICINE

## 2023-09-12 PROCEDURE — 99285 EMERGENCY DEPT VISIT HI MDM: CPT

## 2023-09-12 PROCEDURE — 25010000002 CEFTRIAXONE PER 250 MG: Performed by: INTERNAL MEDICINE

## 2023-09-12 PROCEDURE — 36415 COLL VENOUS BLD VENIPUNCTURE: CPT

## 2023-09-12 RX ORDER — MORPHINE SULFATE 2 MG/ML
2 INJECTION, SOLUTION INTRAMUSCULAR; INTRAVENOUS EVERY 4 HOURS PRN
Status: DISCONTINUED | OUTPATIENT
Start: 2023-09-12 | End: 2023-09-14

## 2023-09-12 RX ORDER — POLYETHYLENE GLYCOL 3350 17 G/17G
17 POWDER, FOR SOLUTION ORAL DAILY PRN
Status: DISCONTINUED | OUTPATIENT
Start: 2023-09-12 | End: 2023-09-15 | Stop reason: HOSPADM

## 2023-09-12 RX ORDER — ALBUMIN (HUMAN) 12.5 G/50ML
25 SOLUTION INTRAVENOUS ONCE
Status: COMPLETED | OUTPATIENT
Start: 2023-09-12 | End: 2023-09-12

## 2023-09-12 RX ORDER — AMOXICILLIN 250 MG
2 CAPSULE ORAL 2 TIMES DAILY
Status: DISCONTINUED | OUTPATIENT
Start: 2023-09-12 | End: 2023-09-15 | Stop reason: HOSPADM

## 2023-09-12 RX ORDER — BISACODYL 10 MG
10 SUPPOSITORY, RECTAL RECTAL DAILY PRN
Status: DISCONTINUED | OUTPATIENT
Start: 2023-09-12 | End: 2023-09-15 | Stop reason: HOSPADM

## 2023-09-12 RX ORDER — MORPHINE SULFATE 2 MG/ML
2 INJECTION, SOLUTION INTRAMUSCULAR; INTRAVENOUS ONCE
Status: COMPLETED | OUTPATIENT
Start: 2023-09-12 | End: 2023-09-12

## 2023-09-12 RX ORDER — SODIUM CHLORIDE 0.9 % (FLUSH) 0.9 %
10 SYRINGE (ML) INJECTION AS NEEDED
Status: DISCONTINUED | OUTPATIENT
Start: 2023-09-12 | End: 2023-09-15 | Stop reason: HOSPADM

## 2023-09-12 RX ORDER — MORPHINE SULFATE 2 MG/ML
1 INJECTION, SOLUTION INTRAMUSCULAR; INTRAVENOUS EVERY 4 HOURS PRN
Status: DISCONTINUED | OUTPATIENT
Start: 2023-09-12 | End: 2023-09-12 | Stop reason: SDUPTHER

## 2023-09-12 RX ORDER — SODIUM CHLORIDE 9 MG/ML
40 INJECTION, SOLUTION INTRAVENOUS AS NEEDED
Status: DISCONTINUED | OUTPATIENT
Start: 2023-09-12 | End: 2023-09-15 | Stop reason: HOSPADM

## 2023-09-12 RX ORDER — LIDOCAINE HYDROCHLORIDE 20 MG/ML
20 INJECTION, SOLUTION INFILTRATION; PERINEURAL ONCE
Status: DISCONTINUED | OUTPATIENT
Start: 2023-09-12 | End: 2023-09-13 | Stop reason: HOSPADM

## 2023-09-12 RX ORDER — BISACODYL 5 MG/1
5 TABLET, DELAYED RELEASE ORAL DAILY PRN
Status: DISCONTINUED | OUTPATIENT
Start: 2023-09-12 | End: 2023-09-15 | Stop reason: HOSPADM

## 2023-09-12 RX ORDER — TROLAMINE SALICYLATE 10 G/100G
1 CREAM TOPICAL 4 TIMES DAILY PRN
Status: DISCONTINUED | OUTPATIENT
Start: 2023-09-12 | End: 2023-09-15 | Stop reason: HOSPADM

## 2023-09-12 RX ORDER — POTASSIUM CHLORIDE 750 MG/1
20 CAPSULE, EXTENDED RELEASE ORAL ONCE
Status: COMPLETED | OUTPATIENT
Start: 2023-09-12 | End: 2023-09-12

## 2023-09-12 RX ORDER — MORPHINE SULFATE 2 MG/ML
2 INJECTION, SOLUTION INTRAMUSCULAR; INTRAVENOUS EVERY 4 HOURS PRN
Status: DISCONTINUED | OUTPATIENT
Start: 2023-09-12 | End: 2023-09-12

## 2023-09-12 RX ORDER — OXYCODONE HYDROCHLORIDE 10 MG/1
10 TABLET ORAL EVERY 6 HOURS PRN
COMMUNITY

## 2023-09-12 RX ORDER — SODIUM CHLORIDE 0.9 % (FLUSH) 0.9 %
10 SYRINGE (ML) INJECTION EVERY 12 HOURS SCHEDULED
Status: DISCONTINUED | OUTPATIENT
Start: 2023-09-12 | End: 2023-09-15 | Stop reason: HOSPADM

## 2023-09-12 RX ADMIN — IOPAMIDOL 75 ML: 755 INJECTION, SOLUTION INTRAVENOUS at 14:42

## 2023-09-12 RX ADMIN — CEFTRIAXONE SODIUM 2000 MG: 2 INJECTION, POWDER, FOR SOLUTION INTRAMUSCULAR; INTRAVENOUS at 18:14

## 2023-09-12 RX ADMIN — PIPERACILLIN AND TAZOBACTAM 4.5 G: 4; .5 INJECTION, POWDER, FOR SOLUTION INTRAVENOUS at 14:04

## 2023-09-12 RX ADMIN — Medication 1 APPLICATION: at 20:46

## 2023-09-12 RX ADMIN — MORPHINE SULFATE 2 MG: 2 INJECTION, SOLUTION INTRAMUSCULAR; INTRAVENOUS at 14:04

## 2023-09-12 RX ADMIN — POTASSIUM CHLORIDE 20 MEQ: 10 CAPSULE, COATED, EXTENDED RELEASE ORAL at 18:31

## 2023-09-12 RX ADMIN — Medication 10 ML: at 20:45

## 2023-09-12 RX ADMIN — ALBUMIN HUMAN 25 G: 0.25 SOLUTION INTRAVENOUS at 18:13

## 2023-09-12 RX ADMIN — MORPHINE SULFATE 2 MG: 2 INJECTION, SOLUTION INTRAMUSCULAR; INTRAVENOUS at 17:58

## 2023-09-12 RX ADMIN — MORPHINE SULFATE 2 MG: 2 INJECTION, SOLUTION INTRAMUSCULAR; INTRAVENOUS at 22:05

## 2023-09-12 NOTE — ED PROVIDER NOTES
Time: 1:22 PM EDT  Date of encounter:  9/12/2023  Independent Historian/Clinical History and Information was obtained by:   Patient    History is limited by: N/A    Chief Complaint: Abdominal pain      History of Present Illness:  Patient is a 48 y.o. year old female who presents to the emergency department for evaluation of abdominal pain.  Reports that she has a history of hep C cirrhosis and she gets scheduled paracentesis.  She was due for scheduled paracentesis today but she reported that her hernia opened up yesterday and she lost a ton of fluid out of her bellybutton.  Reports since that time her abdomen is hard as well as there is been a wound to her umbilical hernia.  Reports that she had a slightly elevated fever but nothing over 100.4..  Does reports a little nausea and belly pain associated with this.  No other complaints this time.    HPI    Patient Care Team  Primary Care Provider: Luly Myers APRN    Past Medical History:     Allergies   Allergen Reactions    Codeine Nausea Only     Past Medical History:   Diagnosis Date    Advanced hepatic cirrhosis     History of drug use     Infectious viral hepatitis     Seizure     Smoking greater than 30 pack years      Past Surgical History:   Procedure Laterality Date    CHOLECYSTECTOMY      TUBAL ABDOMINAL LIGATION       Family History   Family history unknown: Yes       Home Medications:  Prior to Admission medications    Medication Sig Start Date End Date Taking? Authorizing Provider   furosemide (LASIX) 40 MG tablet Take 1 tablet by mouth Daily. 4/13/23   Erik Urbano MD   gabapentin (NEURONTIN) 300 MG capsule Take 1 capsule by mouth 3 (Three) Times a Day. 3/20/23   Erik Urbano MD   HYDROcodone-acetaminophen (Norco) 7.5-325 MG per tablet Take 1 tablet by mouth Every 6 (Six) Hours As Needed for Moderate Pain. 7/19/23   Melvin Trujillo MD   lactulose (CHRONULAC) 10 GM/15ML solution Take 30 mL by mouth Daily. 4/13/23   Yolie  "MD Erik   omeprazole (priLOSEC) 20 MG capsule Take 1 capsule by mouth Daily. 3/10/23   ProviderErik MD   ondansetron ODT (ZOFRAN-ODT) 4 MG disintegrating tablet Place 1 tablet on the tongue Every 8 (Eight) Hours As Needed for Nausea or Vomiting. 7/2/23   Reva Smalls MD   spironolactone (ALDACTONE) 100 MG tablet Take 1 tablet by mouth Daily. 4/13/23   ProviderErik MD        Social History:   Social History     Tobacco Use    Smoking status: Every Day     Packs/day: 0.25     Years: 15.00     Pack years: 3.75     Types: Cigarettes    Smokeless tobacco: Never   Vaping Use    Vaping Use: Never used   Substance Use Topics    Alcohol use: Never    Drug use: Yes     Types: Marijuana         Review of Systems:  Review of Systems   Constitutional:  Positive for fever.   Gastrointestinal:  Positive for abdominal pain and nausea.      Physical Exam:  /92 (BP Location: Right arm, Patient Position: Sitting)   Pulse 100   Temp 98 °F (36.7 °C)   Resp 16   Ht 147.3 cm (58\")   Wt 52.8 kg (116 lb 6.5 oz)   LMP  (LMP Unknown)   SpO2 95%   BMI 24.33 kg/m²     Physical Exam  Vitals and nursing note reviewed.   Constitutional:       Appearance: Normal appearance.   HENT:      Head: Normocephalic and atraumatic.   Eyes:      General: No scleral icterus.  Cardiovascular:      Rate and Rhythm: Normal rate and regular rhythm.   Pulmonary:      Effort: Pulmonary effort is normal.      Breath sounds: Normal breath sounds.   Abdominal:      General: There is distension.      Palpations: Abdomen is soft.      Tenderness: There is abdominal tenderness.      Comments: There is an umbilical hernia with a wound noted.  There is no active drainage of fluid noted.  There is redness surrounding the umbilical hernia.  See photo.  The abdomen is distended.   Musculoskeletal:         General: Normal range of motion.      Cervical back: Normal range of motion.   Skin:     Findings: No rash.   Neurological:      " General: No focal deficit present.      Mental Status: She is alert.                Procedures:  Procedures      Medical Decision Making:      Comorbidities that affect care:    Liver disease, Hypertension    External Notes reviewed:    Reviewed admission from 7/18/2023      The following orders were placed and all results were independently analyzed by me:  Orders Placed This Encounter   Procedures    Blood Culture - Blood,    Blood Culture - Blood,    CT Abdomen Pelvis With Contrast    Comprehensive Metabolic Panel    Protime-INR    aPTT    Lipase    Lactic Acid, Plasma    CBC Auto Differential    STAT Lactic Acid, Reflex    Basic Metabolic Panel    CBC Auto Differential    Procalcitonin    Diet: Cardiac Diets; Healthy Heart (2-3 Na+); Texture: Regular Texture (IDDSI 7); Fluid Consistency: Thin (IDDSI 0)    Vital Signs    Up in Chair    Intake & Output    Weigh Patient    Oral Care    Saline Lock & Maintain IV Access    Place Sequential Compression Device    Maintain Sequential Compression Device    Place Venous Foot Pump    Maintain Venous Foot Pump    IP Consult to General Surgery    Inpatient Hospitalist Consult    Inpatient Case Management  Consult    Insert Peripheral IV    Inpatient Admission    CBC & Differential       Medications Given in the Emergency Department:  Medications   sodium chloride 0.9 % flush 10 mL (has no administration in time range)   sodium chloride 0.9 % flush 10 mL (has no administration in time range)   sodium chloride 0.9 % infusion 40 mL (has no administration in time range)   sennosides-docusate (PERICOLACE) 8.6-50 MG per tablet 2 tablet (has no administration in time range)     And   polyethylene glycol (MIRALAX) packet 17 g (has no administration in time range)     And   bisacodyl (DULCOLAX) EC tablet 5 mg (has no administration in time range)     And   bisacodyl (DULCOLAX) suppository 10 mg (has no administration in time range)   cefTRIAXone (ROCEPHIN) 2000 mg/100  mL 0.9% NS IVPB (MBP) (2,000 mg Intravenous New Bag 9/12/23 1814)   morphine injection 2 mg (2 mg Intravenous Given 9/12/23 1758)   morphine injection 1 mg (has no administration in time range)   piperacillin-tazobactam (ZOSYN) 4.5 g/100 mL 0.9% NS IVPB (mbp) (4.5 g Intravenous New Bag 9/12/23 1404)   morphine injection 2 mg (2 mg Intravenous Given 9/12/23 1404)   iopamidol (ISOVUE-370) 76 % injection 100 mL (75 mL Intravenous Given 9/12/23 1442)   albumin human 25 % IV SOLN 25 g (25 g Intravenous New Bag 9/12/23 1813)   potassium chloride (MICRO-K/KLOR-CON) CR capsule (20 mEq Oral Given 9/12/23 1831)        ED Course:    ED Course as of 09/12/23 1854   Tue Sep 12, 2023   1542 Spoke with Dr. Hardy.  No acute surgical intervention at this time. [MA]   8447 Spoke with Dr. Gates who agrees to admit [MA]      ED Course User Index  [MA] Mc Prince MD       Labs:    Lab Results (last 24 hours)       Procedure Component Value Units Date/Time    CBC & Differential [[id number]]  (Abnormal) Collected: 09/12/23 1334    Specimen: Blood Updated: 09/12/23 1346    Narrative:      The following orders were created for panel order CBC & Differential.  Procedure                               Abnormality         Status                     ---------                               -----------         ------                     CBC Auto Differential[[id number]]        Abnormal            Final result               Scan Slide[[id number]]                                                                    Please view results for these tests on the individual orders.    Comprehensive Metabolic Panel [[id number]]  (Abnormal) Collected: 09/12/23 1334    Specimen: Blood Updated: 09/12/23 1406     Glucose 119 mg/dL      BUN 11 mg/dL      Creatinine 0.89 mg/dL      Sodium 129 mmol/L      Potassium 3.4 mmol/L      Chloride 93 mmol/L      CO2 29.0 mmol/L      Calcium 8.1 mg/dL      Total Protein 6.3 g/dL      Albumin 2.9 g/dL      ALT (SGPT)  25 U/L      AST (SGOT) 32 U/L      Alkaline Phosphatase 139 U/L      Total Bilirubin 1.3 mg/dL      Globulin 3.4 gm/dL      A/G Ratio 0.9 g/dL      BUN/Creatinine Ratio 12.4     Anion Gap 7.0 mmol/L      eGFR 79.6 mL/min/1.73     Narrative:      GFR Normal >60  Chronic Kidney Disease <60  Kidney Failure <15      Protime-INR [[id number]]  (Abnormal) Collected: 09/12/23 1334    Specimen: Blood Updated: 09/12/23 1354     Protime 17.8 Seconds      INR 1.47    Narrative:      Suggested Therapeutic Ranges For Oral Anticoagulant Therapy:  Level of Therapy                      INR Target Range  Standard Dose                            2.0-3.0  High Dose                                2.5-3.5  Patients not receiving anticoagulant  Therapy Normal Range                     0.86-1.15    aPTT [[id number]]  (Abnormal) Collected: 09/12/23 1334    Specimen: Blood Updated: 09/12/23 1354     PTT 29.8 seconds     Lipase [[id number]]  (Normal) Collected: 09/12/23 1334    Specimen: Blood Updated: 09/12/23 1406     Lipase 20 U/L     Blood Culture - Blood, Arm, Left [[id number]] Collected: 09/12/23 1334    Specimen: Blood from Arm, Left Updated: 09/12/23 1339    Lactic Acid, Plasma [[id number]]  (Abnormal) Collected: 09/12/23 1334    Specimen: Blood Updated: 09/12/23 1429     Lactate 2.3 mmol/L     CBC Auto Differential [[id number]]  (Abnormal) Collected: 09/12/23 1334    Specimen: Blood Updated: 09/12/23 1346     WBC 7.83 10*3/mm3      RBC 4.45 10*6/mm3      Hemoglobin 13.6 g/dL      Hematocrit 39.6 %      MCV 89.0 fL      MCH 30.6 pg      MCHC 34.3 g/dL      RDW 13.8 %      RDW-SD 44.4 fl      MPV 10.9 fL      Platelets 123 10*3/mm3      Neutrophil % 78.3 %      Lymphocyte % 12.4 %      Monocyte % 8.3 %      Eosinophil % 0.4 %      Basophil % 0.3 %      Immature Grans % 0.3 %      Neutrophils, Absolute 6.14 10*3/mm3      Lymphocytes, Absolute 0.97 10*3/mm3      Monocytes, Absolute 0.65 10*3/mm3      Eosinophils, Absolute 0.03 10*3/mm3       "Basophils, Absolute 0.02 10*3/mm3      Immature Grans, Absolute 0.02 10*3/mm3      nRBC 0.0 /100 WBC     Procalcitonin [[id number]]  (Normal) Collected: 09/12/23 1334    Specimen: Blood Updated: 09/12/23 1631     Procalcitonin 0.20 ng/mL     Narrative:      As a Marker for Sepsis (Non-Neonates):    1. <0.5 ng/mL represents a low risk of severe sepsis and/or septic shock.  2. >2 ng/mL represents a high risk of severe sepsis and/or septic shock.    As a Marker for Lower Respiratory Tract Infections that require antibiotic therapy:    PCT on Admission    Antibiotic Therapy       6-12 Hrs later    >0.5                Strongly Recommended  >0.25 - <0.5        Recommended  0.1 - 0.25          Discouraged              Remeasure/reassess PCT  <0.1                Strongly Discouraged     Remeasure/reassess PCT    As 28 day mortality risk marker: \"Change in Procalcitonin Result\" (>80% or <=80%) if Day 0 (or Day 1) and Day 4 values are available. Refer to http://www.[email or web address]-pct-calculator.[email or web address]    Change in PCT <=80%  A decrease of PCT levels below or equal to 80% defines a positive change in PCT test result representing a higher risk for 28-day all-cause mortality of patients diagnosed with severe sepsis for septic shock.    Change in PCT >80%  A decrease of PCT levels of more than 80% defines a negative change in PCT result representing a lower risk for 28-day all-cause mortality of patients diagnosed with severe sepsis or septic shock.    This test is Prognostic not Diagnostic, if elevated correlate with clinical findings before administering antibiotic treatment.        Blood Culture - Blood, Arm, Left [[id number]] Collected: 09/12/23 1404    Specimen: Blood from Arm, Left Updated: 09/12/23 1413             Imaging:    CT Abdomen Pelvis With Contrast    Result Date: 9/12/2023  PROCEDURE: CT ABDOMEN PELVIS W CONTRAST  COMPARISON: [facility], CT, CT ABDOMEN PELVIS W CONTRAST, 6/10/2023, 19:33.  [facility] " [facility], CT, CT ABDOMEN PELVIS W CONTRAST, 7/02/2023, 1:09.  INDICATIONS: fluid poured out of umbilical hernia yesterday, abd. distension, pt. gets paracentesis regularly  PROTOCOL:   Standard imaging protocol performed    RADIATION:   DLP: 585.1mGy*cm   Automated exposure control was utilized to minimize radiation dose. CONTRAST: 75cc Isovue 370 I.V.  TECHNIQUE: Axial images of the abdomen and pelvis with intravenous contrast.  ABDOMEN:  Emphysema is present in the lung bases.  The liver is cirrhotic with portal hypertension.  Splenomegaly is present measuring 15 cm.  Prior cholecystectomy.  The pancreas, adrenal glands and kidneys are normal.   PELVIS:  There is a large amount of ascites.  Tubal ligation clips are present.  The uterus and adnexa have a normal CT appearance.  No evidence of bowel obstruction or perforation.  The abdominal aorta has a normal caliber.  No acute osseous abnormalities are identified.  There is a fat containing umbilical hernia.  There is a supraumbilical hernia defect containing a 5 cm x 2.8 cm fluid collection.  IMPRESSION:  1. Cirrhosis.  Splenomegaly.  Portal hypertension.  Large amount of abdominal and pelvic ascites.  2. Fat containing umbilical hernia.  Supraumbilical hernia defect containing a 5 cm x 2.8 cm fluid collection.   HEATHER VANCE MD       Electronically Signed and Approved By: HEATHER VANCE MD on 9/12/2023 at 15:13                Differential Diagnosis and Discussion:    Abdominal Pain: Based on the patient's signs and symptoms, I considered abdominal aortic aneurysm, small bowel obstruction, pancreatitis, acute cholecystitis, acute appendecitis, peptic ulcer disease, gastritis, colitis, endocrine disorders, irritable bowel syndrome and other differential diagnosis an etiology of the patient's abdominal pain.    All labs were reviewed and interpreted by me.  CT scan radiology impression was interpreted by me.    MDM     Patient with reported abdominal wound  that was leaking ascitic fluid.  Has a wound at her umbilical hernia but has no active bleeding or drainage at this time.  Obtained a CT which showed umbilical hernia as well as ascites.  Has significant tenderness along the abdomen.  Will place on antibiotics and likely will need admission for further work-up and management.  Did speak with general surgery who recommended better control of her ascitic fluid.      Patient Care Considerations:          Consultants/Shared Management Plan:    Hospitalist: I have discussed the case with Hospitalist who agrees to accept the patient for admission.    Social Determinants of Health:          Disposition and Care Coordination:    Admit:   Through independent evaluation of the patient's history, physical, and imperical data, the patient meets criteria for observation/admission to the hospital.        Final diagnoses:   Abdominal pain, unspecified abdominal location   Open wound of abdominal wall, initial encounter        ED Disposition       ED Disposition   Decision to Admit    Condition   --    Comment   Level of Care: Telemetry [5]   Diagnosis: Ascites [[id number]]   Certification: I Certify That Inpatient Hospital Services Are Medically Necessary For Greater Than 2 Midnights                 This medical record created using voice recognition software.             Mc Prince MD  09/12/23 0454

## 2023-09-12 NOTE — PAYOR COMM NOTE
"Lara Méndez (48 y.o. Female)       Date of Birth   1975    Social Security Number       Address   [address] [address]    Home Phone   [phone]    MRN   [id number]       Alevism   Non-Baptism    Marital Status   Single                            Admission Date   23    Admission Type   Emergency    Admitting Provider   Esdras Gates MD    Attending Provider   Esdras Gates MD    Department, Room/Bed   [facility] 5TH FLOOR SURGICAL TELEMETRY UNIT, 510/       Discharge Date       Discharge Disposition       Discharge Destination                                 Attending Provider: Esdras Gates MD    Allergies: Codeine    Isolation: None   Infection: None   Code Status: Prior    Ht: 147.3 cm (58\")   Wt: 52.8 kg (116 lb 6.5 oz)    Admission Cmt: None   Principal Problem: Ascites [R18.8]                   Active Insurance as of 2023       Primary Coverage       Payor Plan Insurance Group Employer/Plan Group    [place] [id number]       Payor Plan Address Payor Plan Phone Number Payor Plan Fax Number Effective Dates    [employer] [address] [address] [phone]  2021 - None Entered    [address] [id number]         Subscriber Name Subscriber Birth Date Member ID       LARA MÉNDEZ 1975 [id number]                     Emergency Contacts        (Rel.) Home Phone Work Phone Mobile Phone    omega calles (Sister) [phone] -- [phone]                   History & Physical        Esdras Gates MD at [address] [place] [address] HISTORY AND PHYSICAL  Date: 2023   Patient Name: Lara Méndez  : 1975  MRN: [id number]  Primary Care Physician:  Luly Myers APRN  Date of admission: 2023    Subjective   Subjective     Chief Complaint:  Abd pain    HPI:    Lara Méndez is a 48 y.o. female with a history of cirrhosis, gets " "scheduled paracenteses.  She was scheduled to get a paracentesis today.  She said that last night her umbilical hernia \"popped open\" and that she dumped out for soup pots full of \"yellow poison.\"  It sounds as if this had the appearance of her typical ascitic fluid rather than pus.  She came to the emergency department today rather than getting her paracentesis.  General surgery was contacted by the ED physician, advised control of her ascites.  Patient does say that she takes Lasix and Aldactone at home and reports being compliant.  She says that her hernia otherwise did not look any different, that it has been red and discolored for over a year.      Personal History     Past Medical History:  Past Medical History:   Diagnosis Date    Advanced hepatic cirrhosis     History of drug use     Infectious viral hepatitis     Seizure     Smoking greater than 30 pack years          Past Surgical History:  Past Surgical History:   Procedure Laterality Date    CHOLECYSTECTOMY      TUBAL ABDOMINAL LIGATION           Family History:   Family History   Family history unknown: Yes         Social History:   Social History     Socioeconomic History    Marital status: Single   Tobacco Use    Smoking status: Every Day     Packs/day: 0.25     Years: 15.00     Pack years: 3.75     Types: Cigarettes    Smokeless tobacco: Never   Vaping Use    Vaping Use: Never used   Substance and Sexual Activity    Alcohol use: Never    Drug use: Yes     Types: Marijuana    Sexual activity: Not Currently     Partners: Male         Home Medications:  furosemide, omeprazole, oxyCODONE, and spironolactone    Allergies:  Allergies   Allergen Reactions    Codeine Nausea Only       Review of Systems   All systems were reviewed and negative except for what is outlined above    Objective   Objective     Vitals:   Temp:  [98 °F (36.7 °C)-98.4 °F (36.9 °C)] 98 °F (36.7 °C)  Heart Rate:  [] 100  Resp:  [16] 16  BP: (113-143)/() 143/92    Physical " Exam    Constitutional: Thin female no distress   Eyes: Pupils equal, sclerae anicteric, no conjunctival injection   HENT: NCAT, mucous membranes moist   Neck: Supple, no thyromegaly, no lymphadenopathy, trachea midline   Respiratory: Clear to auscultation bilaterally, nonlabored respirations    Cardiovascular: RRR, no murmurs, rubs, or gallops, palpable pedal pulses bilaterally   Gastrointestinal: Appears to still have some degree of ascites, has a large umbilical hernia that is discolored (baseline per patient) with an ulcerated appearing area, not actively draining   Musculoskeletal: No bilateral ankle edema, no clubbing or cyanosis to extremities   Psychiatric: Appropriate affect, cooperative   Neurologic: Oriented x 3, strength symmetric in all extremities, Cranial Nerves grossly intact to confrontation, speech clear   Skin: No rashes     Result Review    Result Review:  I have personally reviewed the results from the time of this admission to 9/12/2023 17:59 EDT and agree with these findings:  [x]  Laboratory  CBC          7/19/2023    05:17 8/18/2023    10:50 9/12/2023    13:34   CBC   WBC 6.95   7.83    RBC 4.25   4.45    Hemoglobin 13.4   13.6    Hematocrit 38.1   39.6    MCV 89.6   89.0    MCH 31.5   30.6    MCHC 35.2   34.3    RDW 14.0   13.8    Platelets 96  94  123      CMP          7/2/2023    00:49 7/19/2023    05:17 9/12/2023    13:34   CMP   Glucose 109  128  119    BUN 10  9  11    Creatinine 0.81  0.77  0.89    EGFR 89.1  94.7  79.6    Sodium 138  136  129    Potassium 3.8  4.2  3.4    Chloride 101  104  93    Calcium 9.0  8.2  8.1    Total Protein 7.5  6.8  6.3    Albumin 4.4  3.1  2.9    Globulin 3.1  3.7  3.4    Total Bilirubin 3.1  1.0  1.3    Alkaline Phosphatase 167  189  139    AST (SGOT) 58  71  32    ALT (SGPT) 48  65  25    Albumin/Globulin Ratio 1.4  0.8  0.9    BUN/Creatinine Ratio 12.3  11.7  12.4    Anion Gap 10.3  7.6  7.0        []  Microbiology  []  Radiology  []  EKG/Telemetry    []  Cardiology/Vascular   []  Pathology  []  Old records  []  Other:      Assessment & Plan   Assessment / Plan     Assessment:   Ascites with leakage from umbilical hernia  Hepatic cirrhosis with chronic ascites requiring paracenteses  Seizure disorder    Plan:  Admit to Medicine  Empiric antibiotics  Will give some albumin as it sounds as if she had a decent amount of drainage yesterday  General surgery contacted by ED, I suspect we just need to keep her ascites minimized  It sounds like we have some room on her diuretics to go up if tolerated  May need paracentesis tomorrow    Discussed with ERMD    DVT prophylaxis:  Mechanical DVT prophylaxis orders are present.    CODE STATUS:         Admission Status:  I believe this patient meets inpatient status.    Electronically signed by Esdras Gates MD, 09/12/23, 5:59 PM EDT.              Electronically signed by Esdras Gates MD at 09/12/23 1818       Vital Signs (last day)       Date/Time Temp Temp src Pulse Resp BP Patient Position SpO2    09/12/23 1728 98 (36.7) -- -- -- 143/92 Sitting --    09/12/23 15:35:06 -- -- 100 16 131/89 Lying 95    09/12/23 1530 -- -- 100 -- 131/89 -- 95    09/12/23 1400 -- -- 109 -- 129/109 -- 84    09/12/23 1315 -- -- 88 -- 113/78 -- 80    09/12/23 1300 -- -- 91 -- 113/77 -- 99    09/12/23 1207 98.4 (36.9) -- 99 16 116/67 -- 100    09/12/23 1206 98.4 (36.9) Oral -- -- -- -- --          Facility-Administered Medications as of 9/12/2023   Medication Dose Route Frequency Provider Last Rate Last Admin    albumin human 25 % IV SOLN 25 g  25 g Intravenous Once Esdras Gates MD   25 g at 09/12/23 1813    sennosides-docusate (PERICOLACE) 8.6-50 MG per tablet 2 tablet  2 tablet Oral BID Esdras Gates MD        And    polyethylene glycol (MIRALAX) packet 17 g  17 g Oral Daily PRN Esdras Gates MD        And    bisacodyl (DULCOLAX) EC tablet 5 mg  5 mg Oral Daily PRN Esdras Gates MD        And    bisacodyl (DULCOLAX) suppository 10 mg  10 mg  Rectal Daily PRN Esdras Gates MD        cefTRIAXone (ROCEPHIN) 2000 mg/100 mL 0.9% NS IVPB (MBP)  2,000 mg Intravenous Q24H Esdras Gates MD   2,000 mg at 09/12/23 1814    [COMPLETED] iopamidol (ISOVUE-370) 76 % injection 100 mL  100 mL Intravenous Once in imaging Mc Prince MD   75 mL at 09/12/23 1442    lidocaine (XYLOCAINE) 2% injection 20 mL  20 mL Injection Once Piedad Guillaume APRN        morphine injection 1 mg  1 mg Intravenous Q4H PRN Esdras Gates MD        [COMPLETED] morphine injection 2 mg  2 mg Intravenous Once Mc Prince MD   2 mg at 09/12/23 1404    morphine injection 2 mg  2 mg Intravenous Q4H PRN Esdras Gates MD   2 mg at 09/12/23 1758    [COMPLETED] piperacillin-tazobactam (ZOSYN) 4.5 g/100 mL 0.9% NS IVPB (mbp)  4.5 g Intravenous Once Mc rPince MD   4.5 g at 09/12/23 1404    [COMPLETED] potassium chloride (MICRO-K/KLOR-CON) CR capsule  20 mEq Oral Once Esdras Gates MD   20 mEq at 09/12/23 1831    sodium bicarbonate injection 8.4% 10 mEq  10 mEq Injection Once Piedad Guillaume, APRMACKENZIE        sodium chloride 0.9 % flush 10 mL  10 mL Intravenous Q12H Esdras Gates MD        sodium chloride 0.9 % flush 10 mL  10 mL Intravenous PRN Esdras Gates MD        sodium chloride 0.9 % infusion 40 mL  40 mL Intravenous PRN Esdras Gates MD         Orders (active)        Start     Ordered    09/13/23 0600  Basic Metabolic Panel  Morning Draw         09/12/23 1740    09/13/23 0600  CBC Auto Differential  Morning Draw         09/12/23 1740    09/12/23 2100  sodium chloride 0.9 % flush 10 mL  Every 12 Hours Scheduled         09/12/23 1740    09/12/23 2100  sennosides-docusate (PERICOLACE) 8.6-50 MG per tablet 2 tablet  2 Times Daily        See Hyperspace for full Linked Orders Report.    09/12/23 1740 09/12/23 2000  Vital Signs  Every 4 Hours       09/12/23 1740 09/12/23 1830  cefTRIAXone (ROCEPHIN) 2000 mg/100 mL 0.9% NS IVPB (MBP)  Every 24 Hours          09/12/23 1740    09/12/23 1830  albumin human 25 % IV SOLN 25 g  Once         09/12/23 1741    09/12/23 1800  Oral Care  2 Times Daily       09/12/23 1740    09/12/23 1746  Inpatient Case Management  Consult  Once        Provider:  (Not yet assigned)    09/12/23 1746    09/12/23 1741  Intake & Output  Every Shift       09/12/23 1740    09/12/23 1741  Weigh Patient  Once         09/12/23 1740    09/12/23 1741  Insert Peripheral IV  Once         09/12/23 1740    09/12/23 1741  Saline Lock & Maintain IV Access  Continuous         09/12/23 1740    09/12/23 1741  Maintain Sequential Compression Device  Continuous         09/12/23 1740 09/12/23 1741  Diet: Cardiac Diets; Healthy Heart (2-3 Na+); Texture: Regular Texture (IDDSI 7); Fluid Consistency: Thin (IDDSI 0)  Diet Effective Now         09/12/23 1740    09/12/23 1741  Cardiac Monitoring  Continuous        Comments: Follow Standing Orders As Outlined in Process Instructions (Open Order Report to View Full Instructions)    09/12/23 1740 09/12/23 1740  sodium chloride 0.9 % flush 10 mL  As Needed         09/12/23 1740    09/12/23 1740  sodium chloride 0.9 % infusion 40 mL  As Needed         09/12/23 1740    09/12/23 1740  polyethylene glycol (MIRALAX) packet 17 g  Daily PRN        See Hyperspace for full Linked Orders Report.    09/12/23 1740    09/12/23 1740  bisacodyl (DULCOLAX) EC tablet 5 mg  Daily PRN        See Hyperspace for full Linked Orders Report.    09/12/23 1740    09/12/23 1740  bisacodyl (DULCOLAX) suppository 10 mg  Daily PRN        See Hyperspace for full Linked Orders Report.    09/12/23 1740    09/12/23 1740  morphine injection 2 mg  Every 4 Hours PRN         09/12/23 1740    09/12/23 1740  morphine injection 1 mg  Every 4 Hours PRN         09/12/23 1741    09/12/23 1634  STAT Lactic Acid, Reflex  PROCEDURE ONCE         09/12/23 1429    09/12/23 1544  Inpatient Hospitalist Consult  Once        Specialty:  Hospitalist  Provider:   Esdras Gates MD    09/12/23 1543    09/12/23 1538  IP Consult to General Surgery  STAT        Specialty:  General Surgery  Provider:  oKdy Hardy MD    09/12/23 1537    09/12/23 1324  Blood Culture - Blood, Arm, Left  Once         09/12/23 1323    09/12/23 1324  Blood Culture - Blood, Arm, Left  Once        Comments: 30 minutes after first collection, or from a different site      09/12/23 1323    Unscheduled  Up in Chair  As Needed       09/12/23 1887

## 2023-09-12 NOTE — ED NOTES
"Pt states she fluid drained from her peritoneal cavity every 10 days due to liver disease. Pt states she has had the hernia for 3 years and has been told to \"put it off for as long as possible\". Pt states last night her hernia began to \"leak\".   "

## 2023-09-12 NOTE — H&P
" [facility]  Date: 2023   Patient Name: Lara Brock  : 1975  MRN: [id number]  Primary Care Physician:  Luly Myers APRN  Date of admission: 2023    Subjective   Subjective     Chief Complaint:  Abd pain    HPI:    Lara Brock is a 48 y.o. female with a history of cirrhosis, gets scheduled paracenteses.  She was scheduled to get a paracentesis today.  She said that last night her umbilical hernia \"popped open\" and that she dumped out for soup pots full of \"yellow poison.\"  It sounds as if this had the appearance of her typical ascitic fluid rather than pus.  She came to the emergency department today rather than getting her paracentesis.  General surgery was contacted by the ED physician, advised control of her ascites.  Patient does say that she takes Lasix and Aldactone at home and reports being compliant.  She says that her hernia otherwise did not look any different, that it has been red and discolored for over a year.      Personal History     Past Medical History:  Past Medical History:   Diagnosis Date    Advanced hepatic cirrhosis     History of drug use     Infectious viral hepatitis     Seizure     Smoking greater than 30 pack years          Past Surgical History:  Past Surgical History:   Procedure Laterality Date    CHOLECYSTECTOMY      TUBAL ABDOMINAL LIGATION           Family History:   Family History   Family history unknown: Yes         Social History:   Social History     Socioeconomic History    Marital status: Single   Tobacco Use    Smoking status: Every Day     Packs/day: 0.25     Years: 15.00     Pack years: 3.75     Types: Cigarettes    Smokeless tobacco: Never   Vaping Use    Vaping Use: Never used   Substance and Sexual Activity    Alcohol use: Never    Drug use: Yes     Types: Marijuana    Sexual activity: Not Currently     Partners: Male         Home Medications:  furosemide, omeprazole, oxyCODONE, and " 1. Increase losartan to 2 tablets per day  2. Stop taking your potassium  3. Recheck your potassium in 1 month          Thank you for choosing [facility] (formerly [facility]).  You may be receiving an email and/or telephone survey request from [facility] Customer Experience regarding your visit today.  Please take a few minutes to respond to the survey to let us know how we are doing.      If you have questions or concerns, please contact us via [employer] or you can contact your care team at [phone].    Our Clinic hours are:  Monday 6:40 am  to 7:00 pm  Tuesday -Friday 6:40 am to 5:00 pm    The [place] outpatient lab hours are:  Monday - Friday 6:10 am to 4:45 pm  Saturdays 7:00 am to 11:00 am  Appointments are required, call [phone]    If you have clinical questions after hours or would like to schedule an appointment,  call the clinic at [phone].   spironolactone    Allergies:  Allergies   Allergen Reactions    Codeine Nausea Only       Review of Systems   All systems were reviewed and negative except for what is outlined above    Objective   Objective     Vitals:   Temp:  [98 °F (36.7 °C)-98.4 °F (36.9 °C)] 98 °F (36.7 °C)  Heart Rate:  [] 100  Resp:  [16] 16  BP: (113-143)/() 143/92    Physical Exam    Constitutional: Thin female no distress   Eyes: Pupils equal, sclerae anicteric, no conjunctival injection   HENT: NCAT, mucous membranes moist   Neck: Supple, no thyromegaly, no lymphadenopathy, trachea midline   Respiratory: Clear to auscultation bilaterally, nonlabored respirations    Cardiovascular: RRR, no murmurs, rubs, or gallops, palpable pedal pulses bilaterally   Gastrointestinal: Appears to still have some degree of ascites, has a large umbilical hernia that is discolored (baseline per patient) with an ulcerated appearing area, not actively draining   Musculoskeletal: No bilateral ankle edema, no clubbing or cyanosis to extremities   Psychiatric: Appropriate affect, cooperative   Neurologic: Oriented x 3, strength symmetric in all extremities, Cranial Nerves grossly intact to confrontation, speech clear   Skin: No rashes     Result Review    Result Review:  I have personally reviewed the results from the time of this admission to 9/12/2023 17:59 EDT and agree with these findings:  [x]  Laboratory  CBC          7/19/2023    05:17 8/18/2023    10:50 9/12/2023    13:34   CBC   WBC 6.95   7.83    RBC 4.25   4.45    Hemoglobin 13.4   13.6    Hematocrit 38.1   39.6    MCV 89.6   89.0    MCH 31.5   30.6    MCHC 35.2   34.3    RDW 14.0   13.8    Platelets 96  94  123      CMP          7/2/2023    00:49 7/19/2023    05:17 9/12/2023    13:34   CMP   Glucose 109  128  119    BUN 10  9  11    Creatinine 0.81  0.77  0.89    EGFR 89.1  94.7  79.6    Sodium 138  136  129    Potassium 3.8  4.2  3.4    Chloride 101  104  93    Calcium 9.0  8.2  8.1     Total Protein 7.5  6.8  6.3    Albumin 4.4  3.1  2.9    Globulin 3.1  3.7  3.4    Total Bilirubin 3.1  1.0  1.3    Alkaline Phosphatase 167  189  139    AST (SGOT) 58  71  32    ALT (SGPT) 48  65  25    Albumin/Globulin Ratio 1.4  0.8  0.9    BUN/Creatinine Ratio 12.3  11.7  12.4    Anion Gap 10.3  7.6  7.0        []  Microbiology  []  Radiology  []  EKG/Telemetry   []  Cardiology/Vascular   []  Pathology  []  Old records  []  Other:      Assessment & Plan   Assessment / Plan     Assessment:   Ascites with leakage from umbilical hernia  Hepatic cirrhosis with chronic ascites requiring paracenteses  Seizure disorder    Plan:  Admit to Medicine  Empiric antibiotics  Will give some albumin as it sounds as if she had a decent amount of drainage yesterday  General surgery contacted by ED, I suspect we just need to keep her ascites minimized  It sounds like we have some room on her diuretics to go up if tolerated  May need paracentesis tomorrow    Discussed with ERMD    DVT prophylaxis:  Mechanical DVT prophylaxis orders are present.    CODE STATUS:         Admission Status:  I believe this patient meets inpatient status.    Electronically signed by Esdras Gates MD, 09/12/23, 5:59 PM EDT.

## 2023-09-13 ENCOUNTER — APPOINTMENT (OUTPATIENT)
Dept: INTERVENTIONAL RADIOLOGY/VASCULAR | Facility: HOSPITAL | Age: 48
End: 2023-09-13

## 2023-09-13 LAB
ANION GAP SERPL CALCULATED.3IONS-SCNC: 5.3 MMOL/L (ref 5–15)
BASOPHILS # BLD AUTO: 0.02 10*3/MM3 (ref 0–0.2)
BASOPHILS NFR BLD AUTO: 0.5 % (ref 0–1.5)
BUN SERPL-MCNC: 10 MG/DL (ref 6–20)
BUN/CREAT SERPL: 14.7 (ref 7–25)
CALCIUM SPEC-SCNC: 7.6 MG/DL (ref 8.6–10.5)
CHLORIDE SERPL-SCNC: 99 MMOL/L (ref 98–107)
CO2 SERPL-SCNC: 26.7 MMOL/L (ref 22–29)
CREAT SERPL-MCNC: 0.68 MG/DL (ref 0.57–1)
D-LACTATE SERPL-SCNC: 1.8 MMOL/L (ref 0.5–2)
D-LACTATE SERPL-SCNC: 2.1 MMOL/L (ref 0.5–2)
DEPRECATED RDW RBC AUTO: 44.7 FL (ref 37–54)
EGFRCR SERPLBLD CKD-EPI 2021: 106.9 ML/MIN/1.73
EOSINOPHIL # BLD AUTO: 0.1 10*3/MM3 (ref 0–0.4)
EOSINOPHIL NFR BLD AUTO: 2.4 % (ref 0.3–6.2)
ERYTHROCYTE [DISTWIDTH] IN BLOOD BY AUTOMATED COUNT: 13.8 % (ref 12.3–15.4)
GLUCOSE SERPL-MCNC: 141 MG/DL (ref 65–99)
HCT VFR BLD AUTO: 31.2 % (ref 34–46.6)
HGB BLD-MCNC: 10.6 G/DL (ref 12–15.9)
IMM GRANULOCYTES # BLD AUTO: 0.02 10*3/MM3 (ref 0–0.05)
IMM GRANULOCYTES NFR BLD AUTO: 0.5 % (ref 0–0.5)
LYMPHOCYTES # BLD AUTO: 0.83 10*3/MM3 (ref 0.7–3.1)
LYMPHOCYTES NFR BLD AUTO: 19.6 % (ref 19.6–45.3)
MCH RBC QN AUTO: 30.6 PG (ref 26.6–33)
MCHC RBC AUTO-ENTMCNC: 34 G/DL (ref 31.5–35.7)
MCV RBC AUTO: 90.2 FL (ref 79–97)
MONOCYTES # BLD AUTO: 0.6 10*3/MM3 (ref 0.1–0.9)
MONOCYTES NFR BLD AUTO: 14.2 % (ref 5–12)
NEUTROPHILS NFR BLD AUTO: 2.66 10*3/MM3 (ref 1.7–7)
NEUTROPHILS NFR BLD AUTO: 62.8 % (ref 42.7–76)
NRBC BLD AUTO-RTO: 0 /100 WBC (ref 0–0.2)
PLATELET # BLD AUTO: 106 10*3/MM3 (ref 140–450)
PMV BLD AUTO: 10.6 FL (ref 6–12)
POTASSIUM SERPL-SCNC: 3.7 MMOL/L (ref 3.5–5.2)
RBC # BLD AUTO: 3.46 10*6/MM3 (ref 3.77–5.28)
SODIUM SERPL-SCNC: 131 MMOL/L (ref 136–145)
WBC NRBC COR # BLD: 4.23 10*3/MM3 (ref 3.4–10.8)

## 2023-09-13 PROCEDURE — 36415 COLL VENOUS BLD VENIPUNCTURE: CPT | Performed by: EMERGENCY MEDICINE

## 2023-09-13 PROCEDURE — 85025 COMPLETE CBC W/AUTO DIFF WBC: CPT | Performed by: INTERNAL MEDICINE

## 2023-09-13 PROCEDURE — 80048 BASIC METABOLIC PNL TOTAL CA: CPT | Performed by: INTERNAL MEDICINE

## 2023-09-13 PROCEDURE — 76705 ECHO EXAM OF ABDOMEN: CPT

## 2023-09-13 PROCEDURE — 25010000002 ALBUMIN HUMAN 25% PER 50 ML: Performed by: INTERNAL MEDICINE

## 2023-09-13 PROCEDURE — 25010000002 CEFTRIAXONE PER 250 MG: Performed by: INTERNAL MEDICINE

## 2023-09-13 PROCEDURE — 83605 ASSAY OF LACTIC ACID: CPT | Performed by: EMERGENCY MEDICINE

## 2023-09-13 PROCEDURE — 25010000002 MORPHINE PER 10 MG: Performed by: PHYSICIAN ASSISTANT

## 2023-09-13 PROCEDURE — 99233 SBSQ HOSP IP/OBS HIGH 50: CPT | Performed by: INTERNAL MEDICINE

## 2023-09-13 PROCEDURE — P9047 ALBUMIN (HUMAN), 25%, 50ML: HCPCS | Performed by: INTERNAL MEDICINE

## 2023-09-13 RX ORDER — FUROSEMIDE 40 MG/1
40 TABLET ORAL
Status: DISCONTINUED | OUTPATIENT
Start: 2023-09-13 | End: 2023-09-15 | Stop reason: HOSPADM

## 2023-09-13 RX ORDER — MIDODRINE HYDROCHLORIDE 2.5 MG/1
2.5 TABLET ORAL
Status: DISCONTINUED | OUTPATIENT
Start: 2023-09-13 | End: 2023-09-15 | Stop reason: HOSPADM

## 2023-09-13 RX ORDER — LIDOCAINE HYDROCHLORIDE 20 MG/ML
20 INJECTION, SOLUTION INFILTRATION; PERINEURAL ONCE
Status: DISCONTINUED | OUTPATIENT
Start: 2023-09-13 | End: 2023-09-13

## 2023-09-13 RX ORDER — SPIRONOLACTONE 25 MG/1
50 TABLET ORAL
Status: DISCONTINUED | OUTPATIENT
Start: 2023-09-13 | End: 2023-09-15 | Stop reason: HOSPADM

## 2023-09-13 RX ORDER — OXYCODONE HYDROCHLORIDE 5 MG/1
10 TABLET ORAL EVERY 6 HOURS PRN
Status: DISCONTINUED | OUTPATIENT
Start: 2023-09-13 | End: 2023-09-15 | Stop reason: HOSPADM

## 2023-09-13 RX ORDER — ALBUMIN (HUMAN) 12.5 G/50ML
12.5 SOLUTION INTRAVENOUS EVERY 8 HOURS
Status: COMPLETED | OUTPATIENT
Start: 2023-09-13 | End: 2023-09-15

## 2023-09-13 RX ADMIN — ALBUMIN HUMAN 12.5 G: 0.25 SOLUTION INTRAVENOUS at 16:34

## 2023-09-13 RX ADMIN — MIDODRINE HYDROCHLORIDE 2.5 MG: 2.5 TABLET ORAL at 16:34

## 2023-09-13 RX ADMIN — FUROSEMIDE 40 MG: 40 TABLET ORAL at 17:41

## 2023-09-13 RX ADMIN — SPIRONOLACTONE 50 MG: 25 TABLET ORAL at 08:29

## 2023-09-13 RX ADMIN — OXYCODONE HYDROCHLORIDE 10 MG: 5 TABLET ORAL at 23:59

## 2023-09-13 RX ADMIN — CEFTRIAXONE SODIUM 2000 MG: 2 INJECTION, POWDER, FOR SOLUTION INTRAMUSCULAR; INTRAVENOUS at 17:41

## 2023-09-13 RX ADMIN — SPIRONOLACTONE 50 MG: 25 TABLET ORAL at 17:41

## 2023-09-13 RX ADMIN — Medication 10 ML: at 20:49

## 2023-09-13 RX ADMIN — Medication 1 APPLICATION: at 20:52

## 2023-09-13 RX ADMIN — Medication 10 ML: at 08:30

## 2023-09-13 RX ADMIN — MORPHINE SULFATE 2 MG: 2 INJECTION, SOLUTION INTRAMUSCULAR; INTRAVENOUS at 12:53

## 2023-09-13 RX ADMIN — MORPHINE SULFATE 2 MG: 2 INJECTION, SOLUTION INTRAMUSCULAR; INTRAVENOUS at 07:09

## 2023-09-13 RX ADMIN — ALBUMIN HUMAN 12.5 G: 0.25 SOLUTION INTRAVENOUS at 08:29

## 2023-09-13 RX ADMIN — MORPHINE SULFATE 2 MG: 2 INJECTION, SOLUTION INTRAMUSCULAR; INTRAVENOUS at 21:33

## 2023-09-13 RX ADMIN — MORPHINE SULFATE 2 MG: 2 INJECTION, SOLUTION INTRAMUSCULAR; INTRAVENOUS at 17:43

## 2023-09-13 RX ADMIN — FUROSEMIDE 40 MG: 40 TABLET ORAL at 08:29

## 2023-09-13 RX ADMIN — OXYCODONE HYDROCHLORIDE 10 MG: 5 TABLET ORAL at 11:13

## 2023-09-13 RX ADMIN — OXYCODONE HYDROCHLORIDE 10 MG: 5 TABLET ORAL at 16:34

## 2023-09-13 RX ADMIN — MORPHINE SULFATE 2 MG: 2 INJECTION, SOLUTION INTRAMUSCULAR; INTRAVENOUS at 02:19

## 2023-09-13 RX ADMIN — MIDODRINE HYDROCHLORIDE 2.5 MG: 2.5 TABLET ORAL at 11:11

## 2023-09-13 NOTE — PLAN OF CARE
Problem: Adult Inpatient Plan of Care  Goal: Plan of Care Review  Outcome: Ongoing, Progressing  Flowsheets (Taken 9/13/2023 1537)  Plan of Care Reviewed With: patient  Outcome Evaluation: Albumin given this shift, see MAR. Unable to have paracentesis this shift. Pain controlled throughout shift, see MAR. VSS. Toleratiogn diet well. Rosie Barros RN.  Goal: Patient-Specific Goal (Individualized)  Outcome: Ongoing, Progressing  Goal: Absence of Hospital-Acquired Illness or Injury  Outcome: Ongoing, Progressing  Intervention: Identify and Manage Fall Risk  Description: Perform standard risk assessment on admission using a validated tool or comprehensive approach appropriate to the patient; reassess fall risk frequently, with change in status or transfer to another level of care.  Communicate fall injury risk to interprofessional healthcare team.  Determine need for increased observation, equipment and environmental modification, such as low bed, signage and supportive, nonskid footwear.  Adjust safety measures to individual developmental age, stage and identified risk factors.  Reinforce the importance of safety and physical activity with patient and family.  Perform regular intentional rounding to assess need for position change, pain assessment and personal needs, including assistance with toileting.  Recent Flowsheet Documentation  Taken 9/13/2023 1113 by Rosie Barros, RN  Safety Promotion/Fall Prevention: safety round/check completed  Taken 9/13/2023 0701 by Rosie Barros, RN  Safety Promotion/Fall Prevention: safety round/check completed  Intervention: Prevent Skin Injury  Description: Perform a screening for skin injury risk, such as pressure or moisture associated skin damage on admission and at regular intervals throughout hospital stay.  Keep all areas of skin (especially folds) clean and dry.  Maintain adequate skin hydration.  Relieve and redistribute pressure and protect bony prominences; implement  measures based on patient-specific risk factors.  Match turning and repositioning schedule to clinical condition.  Encourage weight shift frequently; assist with reposition if unable to complete independently.  Float heels off bed; avoid pressure on the Achilles tendon.  Keep skin free from extended contact with medical devices.  Encourage functional activity and mobility, as early as tolerated.  Use aids (e.g., slide boards, mechanical lift) during transfer.  Recent Flowsheet Documentation  Taken 9/13/2023 0701 by Rosie Barros, RN  Body Position: position changed independently  Intervention: Prevent and Manage VTE (Venous Thromboembolism) Risk  Description: Assess for VTE (venous thromboembolism) risk.  Encourage and assist with early ambulation.  Initiate and maintain compression or other therapy, as indicated, based on identified risk in accordance with organizational protocol and provider order.  Encourage both active and passive leg exercises while in bed, if unable to ambulate.  Recent Flowsheet Documentation  Taken 9/13/2023 0701 by Rosie Barros, RN  Activity Management: up ad leon  Goal: Optimal Comfort and Wellbeing  Outcome: Ongoing, Progressing  Intervention: Monitor Pain and Promote Comfort  Description: Assess pain level, treatment efficacy and patient response at regular intervals using a consistent pain scale.  Consider the presence and impact of preexisting chronic pain.  Encourage patient and caregiver involvement in pain assessment, interventions and safety measures.  Recent Flowsheet Documentation  Taken 9/13/2023 0701 by Rosie Barros, RN  Pain Management Interventions: see MAR  Intervention: Provide Person-Centered Care  Description: Use a family-focused approach to care.  Develop trust and rapport by proactively providing information, encouraging questions, addressing concerns and offering reassurance.  Acknowledge emotional response to hospitalization.  Recognize and utilize personal  coping strategies.  [place] spiritual and cultural preferences.  Recent Flowsheet Documentation  Taken 9/13/2023 0701 by Rosie Barros RN  Trust Relationship/Rapport:   care explained   choices provided   emotional support provided   empathic listening provided   questions answered   questions encouraged   reassurance provided   thoughts/feelings acknowledged  Goal: Readiness for Transition of Care  Outcome: Ongoing, Progressing   Goal Outcome Evaluation:  Plan of Care Reviewed With: patient           Outcome Evaluation: Albumin given this shift, see MAR. Unable to have paracentesis this shift. Pain controlled throughout shift, see MAR. VSS. Toleratiogn diet well. Rosie Barros RN.

## 2023-09-13 NOTE — SIGNIFICANT NOTE
09/13/23 1216   Values/Beliefs   Spiritual, Cultural Beliefs, Voodoo Practices, Values that Affect Care no   Values/Beliefs Comment Denies any Alevism preference   Behavior WDL   Behavior WDL WDL   Emotion Mood WDL   Emotion/Mood/Affect WDL WDL   Mental Health   Little Interest or Pleasure in Doing Things 3-->nearly every day   Feeling Down, Depressed or Hopeless 3-->nearly every day   Do you feel stress - tense, restless, nervous, or anxious, or unable to sleep at night because your mind is troubled all the time - these days? Very much   Speech WDL   Speech WDL WDL   Perceptual State WDL   Perceptual State WDL WDL   Thought Process WDL   Thought Process WDL WDL   Intellectual Performance WDL   Intellectual Performance WDL WDL   Coping/Stress   Major Change/Loss/Stressor illness;housing concerns   Sources of Support sibling(s)   C-SSRS (Recent)   Q1 Wished to be Dead (Past Month) no   Q2 Suicidal Thoughts (Past Month) no   Q6 Suicide Behavior (Lifetime) no   Violence Risk   Feels Like Hurting Others no   Previous Attempt to Harm Others no       SW met with patient at the bedside to complete psychosocial assessment, due to HX of substance abuse. Patient discussed with SW that she is currently living with a friend. She reports that she and her sister plan to move in together, however have been unsuccessful in finding a place. Pt states her sister plans on assisting her and caring for her. Patient notified SW that she has a HX with anxiety and depression, and is currently depressed everyday, due to her situation. Patient also reports having anxiety, and is scared. Discussed patient's triggers with her, pt states her housing situation is stressing her out, as well as her illness. Pt does report that her sister is working on, and making attempts to find place too. SW discussed patient's HX with substance abuse. Pt reports that she has been clean for 4-5 years and went to a treatment program to get clean. Patient  denies any current substance abuse. Discussed patient's family with her. Patient notified SW that she has a daughter, who is 30 years old, pt stating at times the relationship is good and sometimes it is not. Pt does report being very close with her sister.  Pt also reports she is not currently , and is . SW will provide patient with community resource guide for housing concerns. Pt does tell SW that she is not concerned about money as she gets a disability check, but is mostly concerned about finding a place. Uncertain of DC plan Will follow patient at this time    Susie Micki GODOY, GLADYS

## 2023-09-13 NOTE — PROGRESS NOTES
" [facility]   Hospitalist Progress Note  Date: 2023  Patient Name: Lara Brock  : 1975  MRN: [id number]  Date of admission: 2023  Room/Bed: [facility]/      Subjective   Subjective     Chief Complaint: Hernia draining    Summary:Lara Brock is a 48 y.o. female with cirrhosis, gets regular paracenteses.  She was due a paracentesis prior to admission, but before that happened she said that a scab on her umbilical hernia popped open and she dumped \"four soup pots full \"of yellow fluid out.  She came to the ED at the direction of her outpatient provider, no further drainage from her hernia.  Was seen by general surgery, no acute surgical indications.  Paracentesis attempted to get fluid studies but not enough fluid at this point to safely perform.  The plan will be to discharge home tomorrow on oral antibiotics with no acute issues.    Interval Followup: denies new complaints other than pain in her abdomen \"from all that fluid shifting around\"    Review of Systems    All systems reviewed and negative except for what is outlined above.      Objective   Objective     Vitals:   Temp:  [97.8 °F (36.6 °C)-98.3 °F (36.8 °C)] 97.8 °F (36.6 °C)  Heart Rate:  [66-92] 81  Resp:  [14-16] 14  BP: ()/(46-93) 111/67    Physical Exam   General: Awake, alert, NAD  HENT: NCAT, MMM  Eyes: pupils equal, no scleral icterus  Cardiovascular: RRR, no murmurs   Pulmonary: CTA bilaterally; no wheezes; no conversational dyspnea  Gastrointestinal: s/nd; large umbillical hernia not actively draining  Musculoskeletal: No gross deformities  Skin: No jaundice, no rash on exposed skin appreciated  Neuro: CN II through XII grossly intact; speech clear; no tremor  Psych: Mood and affect appropriate  : No Arnold catheter; no suprapubic tenderness    Result Review    Result Review:  I have personally reviewed these results:  [x]  Laboratory      Lab 23  0742 23  0741 23  0419 23  1334 "   WBC 4.23  --   --  7.83   HEMOGLOBIN 10.6*  --   --  13.6   HEMATOCRIT 31.2*  --   --  39.6   PLATELETS 106*  --   --  123*   NEUTROS ABS 2.66  --   --  6.14   IMMATURE GRANS (ABS) 0.02  --   --  0.02   LYMPHS ABS 0.83  --   --  0.97   MONOS ABS 0.60  --   --  0.65   EOS ABS 0.10  --   --  0.03   MCV 90.2  --   --  89.0   PROCALCITONIN  --   --   --  0.20   LACTATE  --  1.8 2.1* 2.3*   PROTIME  --   --   --  17.8*   APTT  --   --   --  29.8*         Lab 09/13/23  0419 09/12/23  1334   SODIUM 131* 129*   POTASSIUM 3.7 3.4*   CHLORIDE 99 93*   CO2 26.7 29.0   ANION GAP 5.3 7.0   BUN 10 11   CREATININE 0.68 0.89   EGFR 106.9 79.6   GLUCOSE 141* 119*   CALCIUM 7.6* 8.1*         Lab 09/12/23  1334   TOTAL PROTEIN 6.3   ALBUMIN 2.9*   GLOBULIN 3.4   ALT (SGPT) 25   AST (SGOT) 32   BILIRUBIN 1.3*   ALK PHOS 139*   LIPASE 20         Lab 09/12/23  1334   PROTIME 17.8*   INR 1.47*                 Brief Urine Lab Results  (Last result in the past 365 days)        Color   Clarity   Blood   Leuk Est   Nitrite   Protein   CREAT   Urine HCG        07/19/23 1221 Yellow   Clear   Negative   Trace   Negative   Negative                 [x]  Microbiology   Microbiology Results (last 10 days)       Procedure Component Value - Date/Time    Blood Culture - Blood, Arm, Left [[id number]]  (Normal) Collected: 09/12/23 1404    Lab Status: Preliminary result Specimen: Blood from Arm, Left Updated: 09/13/23 1415     Blood Culture No growth at 24 hours    Blood Culture - Blood, Arm, Left [[id number]]  (Normal) Collected: 09/12/23 1334    Lab Status: Preliminary result Specimen: Blood from Arm, Left Updated: 09/13/23 1345     Blood Culture No growth at 24 hours          [x]  Radiology  US Abdomen Limited    Result Date: 9/13/2023    Scant amount of ascitic fluid not amenable to drainage     KUSUM MOFFETT       Electronically Signed and Approved By: ROXANA PIPER MD on 9/13/2023 at 15:28            []  EKG/Telemetry   []  Cardiology/Vascular    []  Pathology  []  Old records  []  Other:    Assessment & Plan   Assessment / Plan       Assessment:   Ascites with leakage from umbilical hernia  Hepatic cirrhosis with chronic ascites requiring paracenteses  Seizure disorder      Plan:  Admit to Medicine  Empiric antibiotics  Albumin administered  Discussed with general surgery, no acute indication for surgical intervention  Went down for ultrasound, not enough fluid to safely attempt drainage.  Currently no fever or white count but given the aseptic nature of her ascitic fluid drainage at home I will cover her empirically with antibiotics  Anticipate discharge tomorrow  Advised follow-up with her outpatient gastroenterology clinic         Discussed with RN.    DVT prophylaxis:  Mechanical DVT prophylaxis orders are present.    CODE STATUS:        Electronically signed by Esdras Gates MD, 09/13/23, 4:51 PM EDT.

## 2023-09-13 NOTE — CONSULTS
History and Physical    Patient Name:  Lara Brock  YOB: 1975  [id number]    Referring Provider:  Dr. Prince      Patient Care Team:  Luly Myers APRN as PCP - General (Family Medicine)    Reason for consult/Chief complaint:  umbilical hernia    Subjective .     History of present illness:  Lraa Brock is a 48 y.o. who presented to the ED at [facility] for spontaneous drainage from an umbilical hernia yesterday.  She reports she gets frequent paracentesis for ascites.  She reports she has had an umbilical hernia for a very long time with a scab on the top of it.  She was lifting a pan and started having a large volume of fluid from the hernia.  She had a CT scan that indicates she has a fluid filled umbilical hernia.       History:  Past Medical History:   Diagnosis Date    Advanced hepatic cirrhosis     History of drug use     Infectious viral hepatitis     Seizure     Smoking greater than 30 pack years        Past Surgical History:   Procedure Laterality Date    CHOLECYSTECTOMY      TUBAL ABDOMINAL LIGATION         Family History   Family history unknown: Yes       Social History     Tobacco Use    Smoking status: Every Day     Packs/day: 0.25     Years: 15.00     Pack years: 3.75     Types: Cigarettes    Smokeless tobacco: Never   Vaping Use    Vaping Use: Never used   Substance Use Topics    Alcohol use: Never    Drug use: Yes     Types: Marijuana       Review of Systems:  A complete ROS was performed and all are negative except for what is documented in the HPI.    MEDS:  Prior to Admission medications    Medication Sig Start Date End Date Taking? Authorizing Provider   furosemide (LASIX) 40 MG tablet Take 1 tablet by mouth Daily. 4/13/23  Yes Erik Urbano MD   omeprazole (priLOSEC) 20 MG capsule Take 1 capsule by mouth Daily. 3/10/23  Yes Erik Urbano MD   oxyCODONE (ROXICODONE) 10 MG tablet Take 1 tablet by mouth Every 6 (Six) Hours As Needed for  "Moderate Pain.   Yes Provider, Historical, MD   spironolactone (ALDACTONE) 100 MG tablet Take 1 tablet by mouth Daily. 4/13/23  Yes Provider, MD Erik        albumin human, 12.5 g, Intravenous, Q8H  cefTRIAXone, 2,000 mg, Intravenous, Q24H  furosemide, 40 mg, Oral, BID  midodrine, 2.5 mg, Oral, TID AC  senna-docusate sodium, 2 tablet, Oral, BID  sodium chloride, 10 mL, Intravenous, Q12H  spironolactone, 50 mg, Oral, BID               Allergies:  Codeine    Objective         Physical Exam:      Constitutional:  well nourished, no acute distress, appear stated age BP 97/56 (BP Location: Right arm, Patient Position: Lying)   Pulse 66   Temp 97.9 °F (36.6 °C) (Oral)   Resp 16   Ht 147.3 cm (58\")   Wt 52.8 kg (116 lb 6.5 oz)   LMP  (LMP Unknown)   SpO2 99%   BMI 24.33 kg/m²    Eyes:  anicteric sclerae, moist conjunctivae, no lid lag, PERRLA  ENMT:  oropharynx clear, moist mucous membranes, good dentition  Neck:   full ROM, trachea midline, no thyromegaly  Cardiovascular: RRR, S1 and S2 present, no MRG, heart rate 66, no pedal edema  Respiratory: lungs CTA, respirations even and unlabored  GI:  Abdomen soft, nontender, nondistended, large non reducible umbilical hernia with erythematous skin HSM     Skin:  warm and dry, normal turgor, no rashes  Psychiatric:  alert and oriented x3, intact judgment and insight, cooperative         Results Review: I have reviewed the patient's labs and imaging including CBC, CMP, CT of abd and pelvis    LABS/IMAGING:    WBC   Date Value Ref Range Status   09/13/2023 4.23 3.40 - 10.80 10*3/mm3 Final     RBC   Date Value Ref Range Status   09/13/2023 3.46 (L) 3.77 - 5.28 10*6/mm3 Final     Hemoglobin   Date Value Ref Range Status   09/13/2023 10.6 (L) 12.0 - 15.9 g/dL Final     Hematocrit   Date Value Ref Range Status   09/13/2023 31.2 (L) 34.0 - 46.6 % Final     MCV   Date Value Ref Range Status   09/13/2023 90.2 79.0 - 97.0 fL Final     MCH   Date Value Ref Range Status "   09/13/2023 30.6 26.6 - 33.0 pg Final     MCHC   Date Value Ref Range Status   09/13/2023 34.0 31.5 - 35.7 g/dL Final     RDW   Date Value Ref Range Status   09/13/2023 13.8 12.3 - 15.4 % Final     RDW-SD   Date Value Ref Range Status   09/13/2023 44.7 37.0 - 54.0 fl Final     MPV   Date Value Ref Range Status   09/13/2023 10.6 6.0 - 12.0 fL Final     Platelets   Date Value Ref Range Status   09/13/2023 106 (L) 140 - 450 10*3/mm3 Final     Neutrophil %   Date Value Ref Range Status   09/13/2023 62.8 42.7 - 76.0 % Final     Lymphocyte %   Date Value Ref Range Status   09/13/2023 19.6 19.6 - 45.3 % Final     Monocyte %   Date Value Ref Range Status   09/13/2023 14.2 (H) 5.0 - 12.0 % Final     Eosinophil %   Date Value Ref Range Status   09/13/2023 2.4 0.3 - 6.2 % Final     Basophil %   Date Value Ref Range Status   09/13/2023 0.5 0.0 - 1.5 % Final     Immature Grans %   Date Value Ref Range Status   09/13/2023 0.5 0.0 - 0.5 % Final     Neutrophils, Absolute   Date Value Ref Range Status   09/13/2023 2.66 1.70 - 7.00 10*3/mm3 Final     Lymphocytes, Absolute   Date Value Ref Range Status   09/13/2023 0.83 0.70 - 3.10 10*3/mm3 Final     Monocytes, Absolute   Date Value Ref Range Status   09/13/2023 0.60 0.10 - 0.90 10*3/mm3 Final     Eosinophils, Absolute   Date Value Ref Range Status   09/13/2023 0.10 0.00 - 0.40 10*3/mm3 Final     Basophils, Absolute   Date Value Ref Range Status   09/13/2023 0.02 0.00 - 0.20 10*3/mm3 Final     Immature Grans, Absolute   Date Value Ref Range Status   09/13/2023 0.02 0.00 - 0.05 10*3/mm3 Final     nRBC   Date Value Ref Range Status   09/13/2023 0.0 0.0 - 0.2 /100 WBC Final        Basic Metabolic Panel    Sodium Sodium   Date Value Ref Range Status   09/13/2023 131 (L) 136 - 145 mmol/L Final   09/12/2023 129 (L) 136 - 145 mmol/L Final      Potassium Potassium   Date Value Ref Range Status   09/13/2023 3.7 3.5 - 5.2 mmol/L Final   09/12/2023 3.4 (L) 3.5 - 5.2 mmol/L Final      Chloride  Chloride   Date Value Ref Range Status   09/13/2023 99 98 - 107 mmol/L Final   09/12/2023 93 (L) 98 - 107 mmol/L Final      Bicarbonate No results found for: PLASMABICARB   BUN BUN   Date Value Ref Range Status   09/13/2023 10 6 - 20 mg/dL Final   09/12/2023 11 6 - 20 mg/dL Final      Creatinine Creatinine   Date Value Ref Range Status   09/13/2023 0.68 0.57 - 1.00 mg/dL Final   09/12/2023 0.89 0.57 - 1.00 mg/dL Final      Calcium Calcium   Date Value Ref Range Status   09/13/2023 7.6 (L) 8.6 - 10.5 mg/dL Final   09/12/2023 8.1 (L) 8.6 - 10.5 mg/dL Final      Glucose      No components found for: GLUCOSE.*        Lab Results   Component Value Date    GLUCOSE 141 (H) 09/13/2023    BUN 10 09/13/2023    CREATININE 0.68 09/13/2023    BCR 14.7 09/13/2023    K 3.7 09/13/2023    CO2 26.7 09/13/2023    CALCIUM 7.6 (L) 09/13/2023    ALBUMIN 2.9 (L) 09/12/2023    AST 32 09/12/2023    ALT 25 09/12/2023          CT Abdomen Pelvis With Contrast    Result Date: 9/12/2023  PROCEDURE: CT ABDOMEN PELVIS W CONTRAST  COMPARISON: [facility], CT, CT ABDOMEN PELVIS W CONTRAST, 6/10/2023, 19:33.  [facility], CT, CT ABDOMEN PELVIS W CONTRAST, 7/02/2023, 1:09.  INDICATIONS: fluid poured out of umbilical hernia yesterday, abd. distension, pt. gets paracentesis regularly  PROTOCOL:   Standard imaging protocol performed    RADIATION:   DLP: 585.1mGy*cm   Automated exposure control was utilized to minimize radiation dose. CONTRAST: 75cc Isovue 370 I.V.  TECHNIQUE: Axial images of the abdomen and pelvis with intravenous contrast.  ABDOMEN:  Emphysema is present in the lung bases.  The liver is cirrhotic with portal hypertension.  Splenomegaly is present measuring 15 cm.  Prior cholecystectomy.  The pancreas, adrenal glands and kidneys are normal.   PELVIS:  There is a large amount of ascites.  Tubal ligation clips are present.  The uterus and adnexa have a normal CT appearance.  No evidence of bowel obstruction or  perforation.  The abdominal aorta has a normal caliber.  No acute osseous abnormalities are identified.  There is a fat containing umbilical hernia.  There is a supraumbilical hernia defect containing a 5 cm x 2.8 cm fluid collection.  IMPRESSION:  1. Cirrhosis.  Splenomegaly.  Portal hypertension.  Large amount of abdominal and pelvic ascites.  2. Fat containing umbilical hernia.  Supraumbilical hernia defect containing a 5 cm x 2.8 cm fluid collection.   HEATHER VANCE MD       Electronically Signed and Approved By: HEATHER VANCE MD on 9/12/2023 at 15:13                   Assessment & Plan         Ascites    Local wound care to scabbed area on hernia   No surgery at this time due to extensive ascites              Electronically signed by MARCOS Curiel, 09/13/23, 10:58 AM EDT.

## 2023-09-14 ENCOUNTER — APPOINTMENT (OUTPATIENT)
Dept: CT IMAGING | Facility: HOSPITAL | Age: 48
End: 2023-09-14

## 2023-09-14 LAB
ALBUMIN SERPL-MCNC: 3.1 G/DL (ref 3.5–5.2)
ANION GAP SERPL CALCULATED.3IONS-SCNC: 6.1 MMOL/L (ref 5–15)
BASOPHILS # BLD AUTO: 0.02 10*3/MM3 (ref 0–0.2)
BASOPHILS NFR BLD AUTO: 0.5 % (ref 0–1.5)
BUN SERPL-MCNC: 10 MG/DL (ref 6–20)
BUN/CREAT SERPL: 19.2 (ref 7–25)
CALCIUM SPEC-SCNC: 8 MG/DL (ref 8.6–10.5)
CHLORIDE SERPL-SCNC: 101 MMOL/L (ref 98–107)
CO2 SERPL-SCNC: 23.9 MMOL/L (ref 22–29)
CREAT SERPL-MCNC: 0.52 MG/DL (ref 0.57–1)
DEPRECATED RDW RBC AUTO: 47.2 FL (ref 37–54)
EGFRCR SERPLBLD CKD-EPI 2021: 114.1 ML/MIN/1.73
EOSINOPHIL # BLD AUTO: 0.07 10*3/MM3 (ref 0–0.4)
EOSINOPHIL NFR BLD AUTO: 1.9 % (ref 0.3–6.2)
ERYTHROCYTE [DISTWIDTH] IN BLOOD BY AUTOMATED COUNT: 13.6 % (ref 12.3–15.4)
GLUCOSE SERPL-MCNC: 224 MG/DL (ref 65–99)
HCT VFR BLD AUTO: 32.3 % (ref 34–46.6)
HGB BLD-MCNC: 10.3 G/DL (ref 12–15.9)
IMM GRANULOCYTES # BLD AUTO: 0.02 10*3/MM3 (ref 0–0.05)
IMM GRANULOCYTES NFR BLD AUTO: 0.5 % (ref 0–0.5)
LYMPHOCYTES # BLD AUTO: 0.66 10*3/MM3 (ref 0.7–3.1)
LYMPHOCYTES NFR BLD AUTO: 17.5 % (ref 19.6–45.3)
MAGNESIUM SERPL-MCNC: 2.2 MG/DL (ref 1.6–2.6)
MCH RBC QN AUTO: 30.2 PG (ref 26.6–33)
MCHC RBC AUTO-ENTMCNC: 31.9 G/DL (ref 31.5–35.7)
MCV RBC AUTO: 94.7 FL (ref 79–97)
MONOCYTES # BLD AUTO: 0.42 10*3/MM3 (ref 0.1–0.9)
MONOCYTES NFR BLD AUTO: 11.1 % (ref 5–12)
NEUTROPHILS NFR BLD AUTO: 2.59 10*3/MM3 (ref 1.7–7)
NEUTROPHILS NFR BLD AUTO: 68.5 % (ref 42.7–76)
NRBC BLD AUTO-RTO: 0 /100 WBC (ref 0–0.2)
PHOSPHATE SERPL-MCNC: 1.9 MG/DL (ref 2.5–4.5)
PHOSPHATE SERPL-MCNC: 2.5 MG/DL (ref 2.5–4.5)
PLATELET # BLD AUTO: 104 10*3/MM3 (ref 140–450)
PMV BLD AUTO: 10.9 FL (ref 6–12)
POTASSIUM SERPL-SCNC: 4.3 MMOL/L (ref 3.5–5.2)
PROCALCITONIN SERPL-MCNC: 0.13 NG/ML (ref 0–0.25)
RBC # BLD AUTO: 3.41 10*6/MM3 (ref 3.77–5.28)
SODIUM SERPL-SCNC: 131 MMOL/L (ref 136–145)
WBC NRBC COR # BLD: 3.78 10*3/MM3 (ref 3.4–10.8)

## 2023-09-14 PROCEDURE — 83735 ASSAY OF MAGNESIUM: CPT | Performed by: INTERNAL MEDICINE

## 2023-09-14 PROCEDURE — 84145 PROCALCITONIN (PCT): CPT | Performed by: INTERNAL MEDICINE

## 2023-09-14 PROCEDURE — 99233 SBSQ HOSP IP/OBS HIGH 50: CPT | Performed by: INTERNAL MEDICINE

## 2023-09-14 PROCEDURE — 80069 RENAL FUNCTION PANEL: CPT | Performed by: INTERNAL MEDICINE

## 2023-09-14 PROCEDURE — 25010000002 MORPHINE PER 10 MG: Performed by: INTERNAL MEDICINE

## 2023-09-14 PROCEDURE — P9047 ALBUMIN (HUMAN), 25%, 50ML: HCPCS | Performed by: INTERNAL MEDICINE

## 2023-09-14 PROCEDURE — 74177 CT ABD & PELVIS W/CONTRAST: CPT

## 2023-09-14 PROCEDURE — 25010000002 CEFTRIAXONE PER 250 MG: Performed by: INTERNAL MEDICINE

## 2023-09-14 PROCEDURE — 85025 COMPLETE CBC W/AUTO DIFF WBC: CPT | Performed by: INTERNAL MEDICINE

## 2023-09-14 PROCEDURE — 84100 ASSAY OF PHOSPHORUS: CPT | Performed by: INTERNAL MEDICINE

## 2023-09-14 PROCEDURE — 25510000001 IOPAMIDOL PER 1 ML: Performed by: INTERNAL MEDICINE

## 2023-09-14 PROCEDURE — 25010000002 MORPHINE PER 10 MG: Performed by: PHYSICIAN ASSISTANT

## 2023-09-14 PROCEDURE — 25010000002 ALBUMIN HUMAN 25% PER 50 ML: Performed by: INTERNAL MEDICINE

## 2023-09-14 RX ORDER — MORPHINE SULFATE 2 MG/ML
2 INJECTION, SOLUTION INTRAMUSCULAR; INTRAVENOUS EVERY 4 HOURS PRN
Status: DISCONTINUED | OUTPATIENT
Start: 2023-09-14 | End: 2023-09-15 | Stop reason: HOSPADM

## 2023-09-14 RX ORDER — SODIUM PHOSPHATE IN D5W 15MMOL/250
15 PLASTIC BAG, INJECTION (ML) INTRAVENOUS ONCE
Status: COMPLETED | OUTPATIENT
Start: 2023-09-14 | End: 2023-09-14

## 2023-09-14 RX ORDER — MORPHINE SULFATE 2 MG/ML
2 INJECTION, SOLUTION INTRAMUSCULAR; INTRAVENOUS EVERY 6 HOURS PRN
Status: DISCONTINUED | OUTPATIENT
Start: 2023-09-14 | End: 2023-09-14

## 2023-09-14 RX ADMIN — OXYCODONE HYDROCHLORIDE 10 MG: 5 TABLET ORAL at 07:25

## 2023-09-14 RX ADMIN — SPIRONOLACTONE 50 MG: 25 TABLET ORAL at 08:32

## 2023-09-14 RX ADMIN — MIDODRINE HYDROCHLORIDE 2.5 MG: 2.5 TABLET ORAL at 08:32

## 2023-09-14 RX ADMIN — ALBUMIN HUMAN 12.5 G: 0.25 SOLUTION INTRAVENOUS at 16:31

## 2023-09-14 RX ADMIN — MIDODRINE HYDROCHLORIDE 2.5 MG: 2.5 TABLET ORAL at 10:31

## 2023-09-14 RX ADMIN — OXYCODONE HYDROCHLORIDE 10 MG: 5 TABLET ORAL at 20:31

## 2023-09-14 RX ADMIN — SODIUM PHOSPHATE, MONOBASIC, MONOHYDRATE AND SODIUM PHOSPHATE, DIBASIC, ANHYDROUS 15 MMOL: 142; 276 INJECTION, SOLUTION INTRAVENOUS at 06:34

## 2023-09-14 RX ADMIN — SPIRONOLACTONE 50 MG: 25 TABLET ORAL at 17:51

## 2023-09-14 RX ADMIN — ALBUMIN HUMAN 12.5 G: 0.25 SOLUTION INTRAVENOUS at 23:55

## 2023-09-14 RX ADMIN — FUROSEMIDE 40 MG: 40 TABLET ORAL at 08:32

## 2023-09-14 RX ADMIN — MORPHINE SULFATE 2 MG: 2 INJECTION, SOLUTION INTRAMUSCULAR; INTRAVENOUS at 06:18

## 2023-09-14 RX ADMIN — ALBUMIN HUMAN 12.5 G: 0.25 SOLUTION INTRAVENOUS at 00:22

## 2023-09-14 RX ADMIN — MORPHINE SULFATE 2 MG: 2 INJECTION, SOLUTION INTRAMUSCULAR; INTRAVENOUS at 16:31

## 2023-09-14 RX ADMIN — MORPHINE SULFATE 2 MG: 2 INJECTION, SOLUTION INTRAMUSCULAR; INTRAVENOUS at 02:15

## 2023-09-14 RX ADMIN — IOPAMIDOL 80 ML: 755 INJECTION, SOLUTION INTRAVENOUS at 22:23

## 2023-09-14 RX ADMIN — ALBUMIN HUMAN 12.5 G: 0.25 SOLUTION INTRAVENOUS at 09:02

## 2023-09-14 RX ADMIN — FUROSEMIDE 40 MG: 40 TABLET ORAL at 17:51

## 2023-09-14 RX ADMIN — MIDODRINE HYDROCHLORIDE 2.5 MG: 2.5 TABLET ORAL at 16:32

## 2023-09-14 RX ADMIN — Medication 10 ML: at 20:31

## 2023-09-14 RX ADMIN — MORPHINE SULFATE 4 MG: 4 INJECTION, SOLUTION INTRAMUSCULAR; INTRAVENOUS at 21:55

## 2023-09-14 RX ADMIN — MORPHINE SULFATE 2 MG: 2 INJECTION, SOLUTION INTRAMUSCULAR; INTRAVENOUS at 10:18

## 2023-09-14 RX ADMIN — CEFTRIAXONE SODIUM 2000 MG: 2 INJECTION, POWDER, FOR SOLUTION INTRAMUSCULAR; INTRAVENOUS at 17:51

## 2023-09-14 RX ADMIN — OXYCODONE HYDROCHLORIDE 10 MG: 5 TABLET ORAL at 13:10

## 2023-09-14 NOTE — PROGRESS NOTES
" [facility]   Hospitalist Progress Note  Date: 2023  Patient Name: Lara Brock  : 1975  MRN: [id number]  Date of admission: 2023  Room/Bed: [facility]/      Subjective   Subjective     Chief Complaint: Hernia draining    Summary:Lara Brock is a 48 y.o. female with cirrhosis, gets regular paracenteses.  She was due a paracentesis prior to admission, but before that happened she said that a scab on her umbilical hernia popped open and she dumped \"four soup pots full \"of yellow fluid out.  She came to the ED at the direction of her outpatient provider, no further drainage from her hernia.  Was seen by general surgery, no acute surgical indications.  Paracentesis attempted to get fluid studies but not enough fluid at this point to safely perform.  The plan will be to discharge home tomorrow on oral antibiotics with no acute issues.    Interval Followup: Seen this morning, complains of abdominal pain that she attributes to \"all that fluid shifting around.\"    Review of Systems    All systems reviewed and negative except for what is outlined above.      Objective   Objective     Vitals:   Temp:  [97.3 °F (36.3 °C)-98.2 °F (36.8 °C)] 98.2 °F (36.8 °C)  Heart Rate:  [] 102  Resp:  [16] 16  BP: (100-142)/(58-84) 142/84    Physical Exam   General: Awake, alert, NAD  HENT: NCAT, MMM  Eyes: pupils equal, no scleral icterus  Cardiovascular: RRR, no murmurs   Pulmonary: CTA bilaterally; no wheezes; no conversational dyspnea  Gastrointestinal: s/nd; large umbillical hernia not actively draining  Musculoskeletal: No gross deformities  Skin: No jaundice, no rash on exposed skin appreciated  Neuro: CN II through XII grossly intact; speech clear; no tremor  Psych: Mood and affect appropriate  : No Arnold catheter; no suprapubic tenderness    Result Review    Result Review:  I have personally reviewed these results:  [x]  Laboratory      Lab 23  0447 23  0742 23  0741 " 09/13/23  0419 09/12/23  1334   WBC 3.78 4.23  --   --  7.83   HEMOGLOBIN 10.3* 10.6*  --   --  13.6   HEMATOCRIT 32.3* 31.2*  --   --  39.6   PLATELETS 104* 106*  --   --  123*   NEUTROS ABS 2.59 2.66  --   --  6.14   IMMATURE GRANS (ABS) 0.02 0.02  --   --  0.02   LYMPHS ABS 0.66* 0.83  --   --  0.97   MONOS ABS 0.42 0.60  --   --  0.65   EOS ABS 0.07 0.10  --   --  0.03   MCV 94.7 90.2  --   --  89.0   PROCALCITONIN 0.13  --   --   --  0.20   LACTATE  --   --  1.8 2.1* 2.3*   PROTIME  --   --   --   --  17.8*   APTT  --   --   --   --  29.8*           Lab 09/14/23  1038 09/14/23  0447 09/13/23 0419 09/12/23  1334   SODIUM  --  131* 131* 129*   POTASSIUM  --  4.3 3.7 3.4*   CHLORIDE  --  101 99 93*   CO2  --  23.9 26.7 29.0   ANION GAP  --  6.1 5.3 7.0   BUN  --  10 10 11   CREATININE  --  0.52* 0.68 0.89   EGFR  --  114.1 106.9 79.6   GLUCOSE  --  224* 141* 119*   CALCIUM  --  8.0* 7.6* 8.1*   MAGNESIUM 2.2  --   --   --    PHOSPHORUS 2.5 1.9*  --   --            Lab 09/14/23  0447 09/12/23  1334   TOTAL PROTEIN  --  6.3   ALBUMIN 3.1* 2.9*   GLOBULIN  --  3.4   ALT (SGPT)  --  25   AST (SGOT)  --  32   BILIRUBIN  --  1.3*   ALK PHOS  --  139*   LIPASE  --  20           Lab 09/12/23  1334   PROTIME 17.8*   INR 1.47*                   Brief Urine Lab Results  (Last result in the past 365 days)        Color   Clarity   Blood   Leuk Est   Nitrite   Protein   CREAT   Urine HCG        07/19/23 1221 Yellow   Clear   Negative   Trace   Negative   Negative                 [x]  Microbiology   Microbiology Results (last 10 days)       Procedure Component Value - Date/Time    Blood Culture - Blood, Arm, Left [[id number]]  (Normal) Collected: 09/12/23 1404    Lab Status: Preliminary result Specimen: Blood from Arm, Left Updated: 09/14/23 1415     Blood Culture No growth at 2 days    Blood Culture - Blood, Arm, Left [[id number]]  (Normal) Collected: 09/12/23 1334    Lab Status: Preliminary result Specimen: Blood from Arm, Left  Updated: 09/14/23 1345     Blood Culture No growth at 2 days          [x]  Radiology  US Abdomen Limited    Result Date: 9/13/2023    Scant amount of ascitic fluid not amenable to drainage     KUSUM MOFFETT       Electronically Signed and Approved By: ROXANA PIPER MD on 9/13/2023 at 15:28            []  EKG/Telemetry   []  Cardiology/Vascular   []  Pathology  []  Old records  []  Other:    Assessment & Plan   Assessment / Plan       Assessment:   Ascites with leakage from umbilical hernia  Hepatic cirrhosis with chronic ascites requiring paracenteses  Seizure disorder      Plan:  Admit to Medicine  Empiric antibiotics  Albumin administered  Discussed with general surgery, no acute indication for surgical intervention  Went down for ultrasound on 9/13/2023 not enough fluid to safely attempt drainage.  Currently no fever or white count but given the aseptic nature of her ascitic fluid drainage at home I will cover her empirically with antibiotics  Defer discharge today, possibly discharge home tomorrow on oral antibiotics  Replaced and will monitor electrolytes       Discussed with RN.    DVT prophylaxis:  Mechanical DVT prophylaxis orders are present.    CODE STATUS:        Electronically signed by Esdras Gates MD, 09/14/23, 3:58 PM EDT.

## 2023-09-14 NOTE — PLAN OF CARE
Plan of Care Reviewed With: patient           Outcome Evaluation: Pt discharging. Rosie Barros RN       Provider notified of patient leaking from hernia site. Discharge cancelled. Pt medicated for pain throughout shift. VSS. Rosie Barros RN.

## 2023-09-15 VITALS
HEART RATE: 95 BPM | SYSTOLIC BLOOD PRESSURE: 155 MMHG | HEIGHT: 58 IN | DIASTOLIC BLOOD PRESSURE: 79 MMHG | RESPIRATION RATE: 18 BRPM | OXYGEN SATURATION: 100 % | WEIGHT: 116.4 LBS | BODY MASS INDEX: 24.43 KG/M2 | TEMPERATURE: 98.6 F

## 2023-09-15 PROCEDURE — 25010000002 MORPHINE PER 10 MG: Performed by: INTERNAL MEDICINE

## 2023-09-15 PROCEDURE — 99239 HOSP IP/OBS DSCHRG MGMT >30: CPT | Performed by: INTERNAL MEDICINE

## 2023-09-15 RX ORDER — MIDODRINE HYDROCHLORIDE 2.5 MG/1
2.5 TABLET ORAL 2 TIMES DAILY
Qty: 60 TABLET | Refills: 0 | Status: SHIPPED | OUTPATIENT
Start: 2023-09-15 | End: 2023-10-15

## 2023-09-15 RX ORDER — CIPROFLOXACIN 500 MG/1
500 TABLET, FILM COATED ORAL 2 TIMES DAILY
Qty: 10 TABLET | Refills: 0 | Status: SHIPPED | OUTPATIENT
Start: 2023-09-15 | End: 2023-09-20

## 2023-09-15 RX ORDER — SPIRONOLACTONE 50 MG/1
50 TABLET, FILM COATED ORAL 2 TIMES DAILY
Qty: 60 TABLET | Refills: 0 | Status: SHIPPED | OUTPATIENT
Start: 2023-09-15 | End: 2023-10-15

## 2023-09-15 RX ORDER — CEFPODOXIME PROXETIL 200 MG/1
200 TABLET, FILM COATED ORAL 2 TIMES DAILY
Qty: 10 TABLET | Refills: 0 | Status: SHIPPED | OUTPATIENT
Start: 2023-09-15 | End: 2023-09-15 | Stop reason: HOSPADM

## 2023-09-15 RX ORDER — FUROSEMIDE 40 MG/1
40 TABLET ORAL 2 TIMES DAILY
Qty: 60 TABLET | Refills: 0 | Status: SHIPPED | OUTPATIENT
Start: 2023-09-15 | End: 2023-10-15

## 2023-09-15 RX ADMIN — MORPHINE SULFATE 4 MG: 4 INJECTION, SOLUTION INTRAMUSCULAR; INTRAVENOUS at 02:08

## 2023-09-15 RX ADMIN — Medication 10 ML: at 08:40

## 2023-09-15 RX ADMIN — FUROSEMIDE 40 MG: 40 TABLET ORAL at 08:40

## 2023-09-15 RX ADMIN — OXYCODONE HYDROCHLORIDE 10 MG: 5 TABLET ORAL at 08:39

## 2023-09-15 RX ADMIN — MORPHINE SULFATE 4 MG: 4 INJECTION, SOLUTION INTRAMUSCULAR; INTRAVENOUS at 06:36

## 2023-09-15 RX ADMIN — MIDODRINE HYDROCHLORIDE 2.5 MG: 2.5 TABLET ORAL at 07:23

## 2023-09-15 RX ADMIN — OXYCODONE HYDROCHLORIDE 10 MG: 5 TABLET ORAL at 14:30

## 2023-09-15 RX ADMIN — SPIRONOLACTONE 50 MG: 25 TABLET ORAL at 08:40

## 2023-09-15 RX ADMIN — MIDODRINE HYDROCHLORIDE 2.5 MG: 2.5 TABLET ORAL at 10:45

## 2023-09-15 NOTE — CONSULTS
"Nutrition Services    Patient Name: Lara Brock  YOB: 1975  MRN: [id number]  Admission date: 9/12/2023      CLINICAL NUTRITION ASSESSMENT      Reason for Assessment  MST score 2+   H&P:    Past Medical History:   Diagnosis Date    Advanced hepatic cirrhosis     History of drug use     Infectious viral hepatitis     Seizure     Smoking greater than 30 pack years         Current Problems:   Active Hospital Problems    Diagnosis     **Ascites         Nutrition/Diet History         Narrative     RD nutrition assessment 2/2 MST 5 for 34 or more lbs lost. PMH significant for advanced hepatic cirrhosis. Admitted with ascites.     RD visited patient after lunch. Pt sitting on bedside. Pt reports that she is malnourished and keeps losing muscle. Weight stable x 3 months, ascites may mask additional weight loss. NFPE performed, see full report below. Pt amenable to ONS.     RD will order and CTM per protocol.      Anthropometrics        Current Height, Weight Height: 147.3 cm (58\")  Weight: 52.8 kg (116 lb 6.5 oz)   Current BMI Body mass index is 24.33 kg/m².       Weight Hx  Wt Readings from Last 30 Encounters:   09/12/23 1218 52.8 kg (116 lb 6.5 oz)   07/19/23 1017 51.1 kg (112 lb 10.5 oz)   07/19/23 0500 59.7 kg (131 lb 9.8 oz)   07/18/23 1857 59.7 kg (131 lb 9.8 oz)   07/02/23 0100 61.4 kg (135 lb 5.8 oz)   07/01/23 0215 60.3 kg (132 lb 15 oz)   06/06/23 0303 53.1 kg (117 lb 1 oz)   05/19/23 1038 53.5 kg (118 lb)   04/14/23 1937 57.7 kg (127 lb 3.3 oz)            Wt Change Observation Stable x 3 months     Estimated/Assessed Needs       Energy Requirements 30-35 kcal/kg   EST Needs (kcal/day) 8135-4547 kcal       Protein Requirements 1.2-1.6 g/kg   EST Daily Needs (g/day) 63-84 g       Fluid Requirements 30 ml/kg     Estimated Needs (mL/day) 1584 ml     Labs/Medications         Pertinent Labs Reviewed.   Results from last 7 days   Lab Units 09/14/23  0447 09/13/23  0419 09/12/23  1334   SODIUM " mmol/L 131* 131* 129*   POTASSIUM mmol/L 4.3 3.7 3.4*   CHLORIDE mmol/L 101 99 93*   CO2 mmol/L 23.9 26.7 29.0   BUN mg/dL 10 10 11   CREATININE mg/dL 0.52* 0.68 0.89   CALCIUM mg/dL 8.0* 7.6* 8.1*   BILIRUBIN mg/dL  --   --  1.3*   ALK PHOS U/L  --   --  139*   ALT (SGPT) U/L  --   --  25   AST (SGOT) U/L  --   --  32   GLUCOSE mg/dL 224* 141* 119*     Results from last 7 days   Lab Units 09/14/23  1038 09/14/23  0447   MAGNESIUM mg/dL 2.2  --    PHOSPHORUS mg/dL 2.5 1.9*   HEMOGLOBIN g/dL  --  10.3*   HEMATOCRIT %  --  32.3*     No results found for: COVID19  No results found for: HGBA1C      Pertinent Medications Reviewed.     Current Nutrition Orders & Evaluation of Intake       Oral Nutrition     Current PO Diet Diet: Cardiac Diets; Healthy Heart (2-3 Na+); Texture: Regular Texture (IDDSI 7); Fluid Consistency: Thin (IDDSI 0)   Supplement No active supplement orders       Malnutrition Severity Assessment         Malnutrition Severity Assessment      Patient meets criteria for : Severe Malnutrition  Malnutrition Type (last 8 hours)       Malnutrition Severity Assessment       Row Name 09/15/23 1435       Malnutrition Severity Assessment    Malnutrition Type Chronic Disease - Related Malnutrition      Row Name 09/15/23 1435       Muscle Loss    Loss of Muscle Mass Findings Severe    Spiritism Region Severe - deep hollowing/scooping, lack of muscle to touch, facial bones well defined    Clavicle Bone Region Severe - protruding prominent bone    Acromion Bone Region Severe - squared shoulders, bones, and acromion process protrusion prominent    Scapular Bone Region Severe - prominent bones, depressions easily visible between ribs, scapula, spine, shoulders    Patellar Region Severe - prominent bone, square looking, very little muscle definition    Anterior Thigh Region Severe - line/depression along thigh, obviously thin    Posterior Calf Region Severe - thin with very little definition/firmness      Row Name 09/15/23  1435       Fat Loss    Subcutaneous Fat Loss Findings Severe    Orbital Region  Severe - pronounced hollowness/depression, dark circles, loose saggy skin    Upper Arm Region Severe - mostly skin, very little space between folds, fingers touch      Row Name 09/15/23 1435       Criteria Met (Must meet criteria for severity in at least 2 of these categories: M Wasting, Fat Loss, Fluid, Secondary Signs, Wt. Status, Intake)    Patient meets criteria for  Severe Malnutrition                              Nutrition Diagnosis         Nutrition Dx Problem 1 Severe malnutrition related to decreased ability to consume sufficient energy as evidenced by body composition changes.     Nutrition Intervention         Boost Plus BID (720 kcal, 28 g pro)  Magic Cup QD (290 kcal, 9 g pro)     Medical Nutrition Therapy/Nutrition Education          Learner     Readiness Patient  Eager     Method     Response Explanation  Verbalizes understanding     Monitor/Evaluation        Monitor Per protocol, PO intake, Supplement intake, Weight, POC/GOC     Nutrition Discharge Plan         To be determined     Electronically signed by:  Piedad Whitfield RD  09/15/23 14:30 EDT

## 2023-09-16 ENCOUNTER — READMISSION MANAGEMENT (OUTPATIENT)
Dept: CALL CENTER | Facility: HOSPITAL | Age: 48
End: 2023-09-16
Payer: MEDICAID

## 2023-09-16 NOTE — DISCHARGE SUMMARY
[facility]         HOSPITALIST  DISCHARGE SUMMARY    Patient Name: Lara Brock  : 1975  MRN: [id number]    Date of Admission: 2023  Date of Discharge:  9/15/2023  Primary Care Physician: Luly Myers APRN  Admitting: Medicine  Consultants: Surgery    Final Diagnoses:  Recurrent ascites with leakage from umbilical hernia  Hepatic cirrhosis with chronic ascites requiring frequent paracenteses  Chronic pain issues      Hospital Course     Hospital Course:  Lara Brock is a 48 y.o. female presented with ascites leaking from a large umbilical hernia.  It stopped leaking by the time she got here.  She was given IV albumin given the potential for having auto drained a large amount of ascites.  We attempted to get an ultrasound-guided paracentesis the following day but there was not enough fluid to safely attempt.  She was covered empirically with antibiotics and discharged home on them as well.  She says that her outpatient GI clinic has referred her to transplant center for evaluation and she just got communication from them about a week ago.  Encouraged her to keep that appointment.  Offered to reattempt a paracentesis on day of discharge but patient declined.    DISCHARGE Follow Up Recommendations for labs and diagnostics: f/u with GI, transplant      Day of Discharge     Vital Signs:     Physical Exam: nad s1s2 large umbilical hernia unchanged      Discharge Details        Discharge Medications        New Medications        Instructions Start Date   ciprofloxacin 500 MG tablet  Commonly known as: Cipro   500 mg, Oral, 2 Times Daily      midodrine 2.5 MG tablet  Commonly known as: PROAMATINE   2.5 mg, Oral, 2 Times Daily             Changes to Medications        Instructions Start Date   furosemide 40 MG tablet  Commonly known as: LASIX  What changed: when to take this   40 mg, Oral, 2 Times Daily      spironolactone 50 MG tablet  Commonly known as:  ALDACTONE  What changed:   medication strength  how much to take  when to take this   50 mg, Oral, 2 Times Daily             Continue These Medications        Instructions Start Date   omeprazole 20 MG capsule  Commonly known as: priLOSEC   20 mg, Oral, Daily      oxyCODONE 10 MG tablet  Commonly known as: ROXICODONE   10 mg, Oral, Every 6 Hours PRN               Allergies   Allergen Reactions    Codeine Nausea Only       Discharge Disposition:  Home or Self Care    Diet:  Hospital:No active diet order      Discharge Activity:       CODE STATUS:  There are no questions and answers to display.         Future Appointments   Date Time Provider Department Center   9/22/2023 11:30 AM [facility]   10/3/2023 11:00 AM [facility]           Pertinent  and/or Most Recent Results     PROCEDURES:   none    LAB RESULTS:      Lab 09/14/23  0447 09/13/23  0742 09/13/23  0741 09/13/23  0419 09/12/23  1334   WBC 3.78 4.23  --   --  7.83   HEMOGLOBIN 10.3* 10.6*  --   --  13.6   HEMATOCRIT 32.3* 31.2*  --   --  39.6   PLATELETS 104* 106*  --   --  123*   NEUTROS ABS 2.59 2.66  --   --  6.14   IMMATURE GRANS (ABS) 0.02 0.02  --   --  0.02   LYMPHS ABS 0.66* 0.83  --   --  0.97   MONOS ABS 0.42 0.60  --   --  0.65   EOS ABS 0.07 0.10  --   --  0.03   MCV 94.7 90.2  --   --  89.0   PROCALCITONIN 0.13  --   --   --  0.20   LACTATE  --   --  1.8 2.1* 2.3*   PROTIME  --   --   --   --  17.8*   APTT  --   --   --   --  29.8*         Lab 09/14/23  1038 09/14/23 0447 09/13/23 0419 09/12/23  1334   SODIUM  --  131* 131* 129*   POTASSIUM  --  4.3 3.7 3.4*   CHLORIDE  --  101 99 93*   CO2  --  23.9 26.7 29.0   ANION GAP  --  6.1 5.3 7.0   BUN  --  10 10 11   CREATININE  --  0.52* 0.68 0.89   EGFR  --  114.1 106.9 79.6   GLUCOSE  --  224* 141* 119*   CALCIUM  --  8.0* 7.6* 8.1*   MAGNESIUM 2.2  --   --   --    PHOSPHORUS 2.5 1.9*  --   --          Lab 09/14/23  0447 09/12/23  1334   TOTAL PROTEIN  --  6.3   ALBUMIN  3.1* 2.9*   GLOBULIN  --  3.4   ALT (SGPT)  --  25   AST (SGOT)  --  32   BILIRUBIN  --  1.3*   ALK PHOS  --  139*   LIPASE  --  20         Lab 09/12/23  1334   PROTIME 17.8*   INR 1.47*                 Brief Urine Lab Results  (Last result in the past 365 days)        Color   Clarity   Blood   Leuk Est   Nitrite   Protein   CREAT   Urine HCG        07/19/23 1221 Yellow   Clear   Negative   Trace   Negative   Negative                 Microbiology Results (last 10 days)       Procedure Component Value - Date/Time    Blood Culture - Blood, Arm, Left [[id number]]  (Normal) Collected: 09/12/23 1404    Lab Status: Preliminary result Specimen: Blood from Arm, Left Updated: 09/16/23 1415     Blood Culture No growth at 4 days    Blood Culture - Blood, Arm, Left [[id number]]  (Normal) Collected: 09/12/23 1334    Lab Status: Preliminary result Specimen: Blood from Arm, Left Updated: 09/16/23 1345     Blood Culture No growth at 4 days            CT Abdomen Pelvis With Contrast    Result Date: 9/14/2023    1. Changes of hepatic cirrhosis are again noted with associated secondary chronic changes.  There is severe abdominal ascites. 2. Abdominal ascites extends into the ventral abdominal hernias within the midline.  There is no extension of bowel into the hernias. 3. Otherwise stable CT.      KAITLIN CHAN MD       Electronically Signed and Approved By: KAITLIN CHAN MD on 9/14/2023 at 22:36             US Abdomen Limited    Result Date: 9/13/2023    Scant amount of ascitic fluid not amenable to drainage     KUSUM MOFFETT       Electronically Signed and Approved By: ROXANA PIPER MD on 9/13/2023 at 15:28               Results for orders placed during the hospital encounter of 10/26/21    Duplex venous lower extremity left CAR    Interpretation Summary  · Normal left lower extremity venous duplex scan.      Results for orders placed during the hospital encounter of 10/26/21    Duplex venous lower extremity left  CAR    Interpretation Summary  · Normal left lower extremity venous duplex scan.          Labs Pending at Discharge:  Pending Labs       Order Current Status    Blood Culture - Blood, Arm, Left Preliminary result    Blood Culture - Blood, Arm, Left Preliminary result          Condition at dc: stable for dc    Time spent on Discharge including face to face service:  ~35 minutes    Electronically signed by Esdras Gates MD, 09/16/23, 3:57 PM EDT.   9

## 2023-09-16 NOTE — OUTREACH NOTE
Prep Survey      Flowsheet Row Responses   [facility] facility patient discharged from? [facility]   Is LACE score < 7 ? No   Eligibility Not Eligible   What are the reasons patient is not eligible? Other  [substance abuse]   Does the patient have one of the following disease processes/diagnoses(primary or secondary)? Other   Prep survey completed? Yes            Melissa HUBBARD - Registered Nurse

## 2023-09-17 LAB
BACTERIA SPEC AEROBE CULT: NORMAL
BACTERIA SPEC AEROBE CULT: NORMAL

## 2023-09-19 ENCOUNTER — READMISSION MANAGEMENT (OUTPATIENT)
Dept: CALL CENTER | Facility: HOSPITAL | Age: 48
End: 2023-09-19
Payer: MEDICAID

## 2023-09-19 NOTE — OUTREACH NOTE
Medical Week 1 Survey      Flowsheet Row Responses   [facility] [place] facility patient discharged from? [facility]   Does the patient have one of the following disease processes/diagnoses(primary or secondary)? Other   Week 1 attempt successful? No   Unsuccessful attempts Attempt 1            GARIMA KELLER - Registered Nurse

## 2023-09-27 ENCOUNTER — READMISSION MANAGEMENT (OUTPATIENT)
Dept: CALL CENTER | Facility: HOSPITAL | Age: 48
End: 2023-09-27
Payer: MEDICAID

## 2023-09-27 NOTE — OUTREACH NOTE
Medical Week 2 Survey      Flowsheet Row Responses   [facility] patient discharged from? [facility]   Does the patient have one of the following disease processes/diagnoses(primary or secondary)? Other   Week 2 attempt successful? Yes   Call start time 1524   Call end time 1526   Person spoke with today (if not patient) and relationship sister   Meds reviewed with patient/caregiver? Yes   Is the patient taking all medications as directed (includes completed medication regime)? Yes   Does the patient have a primary care provider?  Yes   Does the patient have an appointment with their PCP within 7 days of discharge? Yes   Has the patient kept scheduled appointments due by today? Yes   Has home health visited the patient within 72 hours of discharge? N/A   Psychosocial issues? No   Did the patient receive a copy of their discharge instructions? Yes   Nursing interventions Reviewed instructions with patient   What is the patient's perception of their health status since discharge? Same   Week 2 Call Completed? Yes   Wrap up additional comments Sister reports Pt not feeling as well today. Advised that Pt call PCP to report any issues   Call end time 1526            JOSE DAVIS - Registered Nurse

## 2023-10-06 ENCOUNTER — READMISSION MANAGEMENT (OUTPATIENT)
Dept: CALL CENTER | Facility: HOSPITAL | Age: 48
End: 2023-10-06
Payer: MEDICAID

## 2023-10-06 NOTE — OUTREACH NOTE
Medical Week 3 Survey      Flowsheet Row Responses   [facility] facility patient discharged from? [facility]   Does the patient have one of the following disease processes/diagnoses(primary or secondary)? Other   Week 3 attempt successful? No   Unsuccessful attempts Attempt 1  [All numbers listed were attempted-no answer]            Ary H - Registered Nurse

## 2023-10-10 ENCOUNTER — READMISSION MANAGEMENT (OUTPATIENT)
Dept: CALL CENTER | Facility: HOSPITAL | Age: 48
End: 2023-10-10
Payer: MEDICAID

## 2023-10-10 NOTE — OUTREACH NOTE
Medical Week 3 Survey      Flowsheet Row Responses   [facility] patient discharged from? [facility]   Does the patient have one of the following disease processes/diagnoses(primary or secondary)? Other   Week 3 attempt successful? No   Unsuccessful attempts Attempt 2   Revoke Decline to participate            Mara KELLER - Registered Nurse